# Patient Record
Sex: MALE | Race: WHITE | Employment: OTHER | URBAN - METROPOLITAN AREA
[De-identification: names, ages, dates, MRNs, and addresses within clinical notes are randomized per-mention and may not be internally consistent; named-entity substitution may affect disease eponyms.]

---

## 2017-01-04 ENCOUNTER — HOSPITAL ENCOUNTER (OUTPATIENT)
Dept: ULTRASOUND IMAGING | Age: 72
Discharge: HOME | End: 2017-01-04
Payer: COMMERCIAL

## 2017-01-04 VITALS
OXYGEN SATURATION: 99 % | DIASTOLIC BLOOD PRESSURE: 82 MMHG | HEART RATE: 73 BPM | BODY MASS INDEX: 23.19 KG/M2 | RESPIRATION RATE: 18 BRPM | SYSTOLIC BLOOD PRESSURE: 138 MMHG | HEIGHT: 70 IN | WEIGHT: 162 LBS

## 2017-01-04 DIAGNOSIS — R18.8 OTHER ASCITES: ICD-10-CM

## 2017-01-04 LAB
AMYLASE FLUID: 29 U/L
APPEARANCE FLUID: NORMAL
CELL COUNT FLUID TYPE: NORMAL
CLOT EVALUATION: NORMAL
COLOR FLUID: YELLOW
FLUID TYPE: NORMAL
GLUCOSE, FLUID: 131 MG/DL
LD, FLUID: 174 U/L
LIPASE BODY FLUID: 9.9 U/L
LYMPHOCYTES, BODY FLUID: 56 %
MONOCYTE, FLUID: 15 %
NEUTROPHIL, FLUID: 29 %
NUCLEATED CELLS FLUID: 277 /CUMM
NUMBER OF CELLS COUNTED FLUID: 100
PROTEIN FLUID: 3.7 G/DL
RBC FLUID: 300 /CUMM

## 2017-01-04 PROCEDURE — 82150 ASSAY OF AMYLASE: CPT

## 2017-01-04 PROCEDURE — 84157 ASSAY OF PROTEIN OTHER: CPT

## 2017-01-04 PROCEDURE — 88112 CYTOPATH CELL ENHANCE TECH: CPT

## 2017-01-04 PROCEDURE — 49083 ABD PARACENTESIS W/IMAGING: CPT

## 2017-01-04 PROCEDURE — 87205 SMEAR GRAM STAIN: CPT

## 2017-01-04 PROCEDURE — 49083 ABD PARACENTESIS W/IMAGING: CPT | Performed by: RADIOLOGY

## 2017-01-04 PROCEDURE — 87070 CULTURE OTHR SPECIMN AEROBIC: CPT

## 2017-01-04 PROCEDURE — 89051 BODY FLUID CELL COUNT: CPT

## 2017-01-04 PROCEDURE — 88305 TISSUE EXAM BY PATHOLOGIST: CPT

## 2017-01-04 PROCEDURE — 83690 ASSAY OF LIPASE: CPT

## 2017-01-04 PROCEDURE — 82945 GLUCOSE OTHER FLUID: CPT

## 2017-01-04 PROCEDURE — 83615 LACTATE (LD) (LDH) ENZYME: CPT

## 2017-01-04 ASSESSMENT — PAIN - FUNCTIONAL ASSESSMENT: PAIN_FUNCTIONAL_ASSESSMENT: 0-10

## 2017-01-05 ENCOUNTER — PROCEDURE VISIT (OUTPATIENT)
Dept: UROLOGY | Age: 72
End: 2017-01-05

## 2017-01-05 VITALS
SYSTOLIC BLOOD PRESSURE: 120 MMHG | HEART RATE: 87 BPM | BODY MASS INDEX: 23.62 KG/M2 | DIASTOLIC BLOOD PRESSURE: 80 MMHG | HEIGHT: 70 IN | WEIGHT: 165 LBS

## 2017-01-05 DIAGNOSIS — R33.9 URINARY RETENTION: Primary | ICD-10-CM

## 2017-01-05 LAB — GRAM STAIN RESULT: NORMAL

## 2017-01-05 PROCEDURE — 99212 OFFICE O/P EST SF 10 MIN: CPT | Performed by: UROLOGY

## 2017-01-05 PROCEDURE — 51725 SIMPLE CYSTOMETROGRAM: CPT | Performed by: UROLOGY

## 2017-01-05 PROCEDURE — 52000 CYSTOURETHROSCOPY: CPT | Performed by: UROLOGY

## 2017-01-07 LAB — BODY FLUID CULTURE, STERILE: NORMAL

## 2017-02-09 ENCOUNTER — OFFICE VISIT (OUTPATIENT)
Dept: UROLOGY | Age: 72
End: 2017-02-09

## 2017-02-09 VITALS
HEART RATE: 77 BPM | BODY MASS INDEX: 23.62 KG/M2 | SYSTOLIC BLOOD PRESSURE: 130 MMHG | HEIGHT: 70 IN | DIASTOLIC BLOOD PRESSURE: 72 MMHG | WEIGHT: 165 LBS

## 2017-02-09 DIAGNOSIS — N31.9 NEUROGENIC BLADDER: ICD-10-CM

## 2017-02-09 DIAGNOSIS — N31.2 HYPOTONIC BLADDER: ICD-10-CM

## 2017-02-09 DIAGNOSIS — R33.9 URINARY RETENTION: Primary | ICD-10-CM

## 2017-02-09 PROCEDURE — 52000 CYSTOURETHROSCOPY: CPT | Performed by: UROLOGY

## 2017-02-09 PROCEDURE — 99213 OFFICE O/P EST LOW 20 MIN: CPT | Performed by: UROLOGY

## 2017-02-09 PROCEDURE — 51725 SIMPLE CYSTOMETROGRAM: CPT | Performed by: UROLOGY

## 2017-02-09 RX ORDER — AMLODIPINE BESYLATE 5 MG/1
TABLET ORAL
COMMUNITY
Start: 2017-01-23 | End: 2017-07-11

## 2017-03-09 ENCOUNTER — OFFICE VISIT (OUTPATIENT)
Dept: UROLOGY | Age: 72
End: 2017-03-09

## 2017-03-09 VITALS
BODY MASS INDEX: 23.62 KG/M2 | DIASTOLIC BLOOD PRESSURE: 70 MMHG | WEIGHT: 165 LBS | HEART RATE: 79 BPM | HEIGHT: 70 IN | SYSTOLIC BLOOD PRESSURE: 120 MMHG

## 2017-03-09 DIAGNOSIS — N31.9 NEUROPATHIC BLADDER: Primary | ICD-10-CM

## 2017-03-09 PROCEDURE — 51703 INSERT BLADDER CATH COMPLEX: CPT | Performed by: UROLOGY

## 2017-03-09 PROCEDURE — 99212 OFFICE O/P EST SF 10 MIN: CPT | Performed by: UROLOGY

## 2017-03-21 ENCOUNTER — HOSPITAL ENCOUNTER (OUTPATIENT)
Age: 72
Setting detail: OUTPATIENT SURGERY
Discharge: HOME | End: 2017-03-21
Attending: SPECIALIST | Admitting: SPECIALIST
Payer: COMMERCIAL

## 2017-03-21 ENCOUNTER — ANESTHESIA (OUTPATIENT)
Dept: ENDOSCOPY | Age: 72
End: 2017-03-21
Payer: COMMERCIAL

## 2017-03-21 ENCOUNTER — ANESTHESIA EVENT (OUTPATIENT)
Dept: ENDOSCOPY | Age: 72
End: 2017-03-21
Payer: COMMERCIAL

## 2017-03-21 VITALS
WEIGHT: 170 LBS | HEART RATE: 70 BPM | BODY MASS INDEX: 24.34 KG/M2 | SYSTOLIC BLOOD PRESSURE: 114 MMHG | HEIGHT: 70 IN | RESPIRATION RATE: 18 BRPM | TEMPERATURE: 98.5 F | DIASTOLIC BLOOD PRESSURE: 66 MMHG | OXYGEN SATURATION: 95 %

## 2017-03-21 VITALS
TEMPERATURE: 97.7 F | DIASTOLIC BLOOD PRESSURE: 76 MMHG | OXYGEN SATURATION: 99 % | SYSTOLIC BLOOD PRESSURE: 125 MMHG | RESPIRATION RATE: 21 BRPM

## 2017-03-21 PROCEDURE — 3609017100 HC EGD: Performed by: SPECIALIST

## 2017-03-21 PROCEDURE — 7100000010 HC PHASE II RECOVERY - FIRST 15 MIN: Performed by: SPECIALIST

## 2017-03-21 PROCEDURE — 3700000000 HC ANESTHESIA ATTENDED CARE: Performed by: SPECIALIST

## 2017-03-21 PROCEDURE — 3700000001 HC ADD 15 MINUTES (ANESTHESIA): Performed by: SPECIALIST

## 2017-03-21 PROCEDURE — 6360000002 HC RX W HCPCS: Performed by: NURSE ANESTHETIST, CERTIFIED REGISTERED

## 2017-03-21 PROCEDURE — 2500000003 HC RX 250 WO HCPCS: Performed by: NURSE ANESTHETIST, CERTIFIED REGISTERED

## 2017-03-21 PROCEDURE — 2580000003 HC RX 258: Performed by: ANESTHESIOLOGY

## 2017-03-21 RX ORDER — SODIUM CHLORIDE 0.9 % (FLUSH) 0.9 %
10 SYRINGE (ML) INJECTION PRN
Status: DISCONTINUED | OUTPATIENT
Start: 2017-03-21 | End: 2017-03-21 | Stop reason: HOSPADM

## 2017-03-21 RX ORDER — SODIUM CHLORIDE 0.9 % (FLUSH) 0.9 %
10 SYRINGE (ML) INJECTION EVERY 12 HOURS SCHEDULED
Status: DISCONTINUED | OUTPATIENT
Start: 2017-03-21 | End: 2017-03-21 | Stop reason: HOSPADM

## 2017-03-21 RX ORDER — SODIUM CHLORIDE 9 MG/ML
INJECTION, SOLUTION INTRAVENOUS CONTINUOUS
Status: DISCONTINUED | OUTPATIENT
Start: 2017-03-21 | End: 2017-03-21 | Stop reason: HOSPADM

## 2017-03-21 RX ORDER — LIDOCAINE HYDROCHLORIDE 20 MG/ML
INJECTION, SOLUTION INFILTRATION; PERINEURAL PRN
Status: DISCONTINUED | OUTPATIENT
Start: 2017-03-21 | End: 2017-03-21 | Stop reason: SDUPTHER

## 2017-03-21 RX ORDER — PROPOFOL 10 MG/ML
INJECTION, EMULSION INTRAVENOUS PRN
Status: DISCONTINUED | OUTPATIENT
Start: 2017-03-21 | End: 2017-03-21 | Stop reason: SDUPTHER

## 2017-03-21 RX ORDER — LIDOCAINE HYDROCHLORIDE 10 MG/ML
1 INJECTION, SOLUTION EPIDURAL; INFILTRATION; INTRACAUDAL; PERINEURAL
Status: DISCONTINUED | OUTPATIENT
Start: 2017-03-21 | End: 2017-03-21 | Stop reason: HOSPADM

## 2017-03-21 RX ORDER — ONDANSETRON 2 MG/ML
4 INJECTION INTRAMUSCULAR; INTRAVENOUS
Status: DISCONTINUED | OUTPATIENT
Start: 2017-03-21 | End: 2017-03-21 | Stop reason: HOSPADM

## 2017-03-21 RX ADMIN — PROPOFOL 80 MG: 10 INJECTION, EMULSION INTRAVENOUS at 09:55

## 2017-03-21 RX ADMIN — PROPOFOL 30 MG: 10 INJECTION, EMULSION INTRAVENOUS at 10:00

## 2017-03-21 RX ADMIN — PROPOFOL 80 MG: 10 INJECTION, EMULSION INTRAVENOUS at 09:50

## 2017-03-21 RX ADMIN — SODIUM CHLORIDE: 9 INJECTION, SOLUTION INTRAVENOUS at 09:28

## 2017-03-21 RX ADMIN — LIDOCAINE HYDROCHLORIDE 40 MG: 20 INJECTION, SOLUTION INFILTRATION; PERINEURAL at 09:47

## 2017-03-21 RX ADMIN — PROPOFOL 150 MG: 10 INJECTION, EMULSION INTRAVENOUS at 09:47

## 2017-03-21 ASSESSMENT — PAIN SCALES - GENERAL
PAINLEVEL_OUTOF10: 0

## 2017-03-21 ASSESSMENT — PAIN - FUNCTIONAL ASSESSMENT: PAIN_FUNCTIONAL_ASSESSMENT: 0-10

## 2017-04-10 ENCOUNTER — OFFICE VISIT (OUTPATIENT)
Dept: UROLOGY | Age: 72
End: 2017-04-10

## 2017-04-10 VITALS
BODY MASS INDEX: 23.62 KG/M2 | WEIGHT: 165 LBS | HEART RATE: 51 BPM | HEIGHT: 70 IN | SYSTOLIC BLOOD PRESSURE: 110 MMHG | DIASTOLIC BLOOD PRESSURE: 60 MMHG

## 2017-04-10 DIAGNOSIS — N31.9 NEUROPATHIC BLADDER: Primary | ICD-10-CM

## 2017-04-10 PROCEDURE — 51703 INSERT BLADDER CATH COMPLEX: CPT | Performed by: UROLOGY

## 2017-04-10 PROCEDURE — 99212 OFFICE O/P EST SF 10 MIN: CPT | Performed by: UROLOGY

## 2017-05-09 ENCOUNTER — OFFICE VISIT (OUTPATIENT)
Dept: UROLOGY | Age: 72
End: 2017-05-09

## 2017-05-09 VITALS
SYSTOLIC BLOOD PRESSURE: 120 MMHG | HEART RATE: 57 BPM | WEIGHT: 175 LBS | HEIGHT: 70 IN | DIASTOLIC BLOOD PRESSURE: 70 MMHG | BODY MASS INDEX: 25.05 KG/M2

## 2017-05-09 DIAGNOSIS — N31.9 NEUROPATHIC BLADDER: Primary | ICD-10-CM

## 2017-05-09 PROCEDURE — 99212 OFFICE O/P EST SF 10 MIN: CPT | Performed by: UROLOGY

## 2017-05-09 PROCEDURE — 51703 INSERT BLADDER CATH COMPLEX: CPT | Performed by: UROLOGY

## 2017-05-09 RX ORDER — FINASTERIDE 5 MG/1
5 TABLET, FILM COATED ORAL DAILY
Qty: 90 TABLET | Refills: 3 | Status: SHIPPED | OUTPATIENT
Start: 2017-05-09 | End: 2017-06-13

## 2017-06-13 ENCOUNTER — PROCEDURE VISIT (OUTPATIENT)
Dept: UROLOGY | Age: 72
End: 2017-06-13

## 2017-06-13 VITALS
BODY MASS INDEX: 25.05 KG/M2 | DIASTOLIC BLOOD PRESSURE: 60 MMHG | WEIGHT: 175 LBS | HEIGHT: 70 IN | SYSTOLIC BLOOD PRESSURE: 110 MMHG | HEART RATE: 58 BPM

## 2017-06-13 DIAGNOSIS — N31.9 NEUROPATHIC BLADDER: Primary | ICD-10-CM

## 2017-06-13 PROCEDURE — 99999 PR OFFICE/OUTPT VISIT,PROCEDURE ONLY: CPT | Performed by: UROLOGY

## 2017-06-13 PROCEDURE — 51703 INSERT BLADDER CATH COMPLEX: CPT | Performed by: UROLOGY

## 2017-07-11 ENCOUNTER — OFFICE VISIT (OUTPATIENT)
Dept: UROLOGY | Age: 72
End: 2017-07-11

## 2017-07-11 VITALS
HEIGHT: 70 IN | BODY MASS INDEX: 25.05 KG/M2 | WEIGHT: 175 LBS | DIASTOLIC BLOOD PRESSURE: 62 MMHG | HEART RATE: 58 BPM | SYSTOLIC BLOOD PRESSURE: 110 MMHG

## 2017-07-11 DIAGNOSIS — N31.9 NEUROPATHIC BLADDER: Primary | ICD-10-CM

## 2017-07-11 PROCEDURE — 99213 OFFICE O/P EST LOW 20 MIN: CPT | Performed by: UROLOGY

## 2017-07-11 PROCEDURE — 51703 INSERT BLADDER CATH COMPLEX: CPT | Performed by: UROLOGY

## 2017-07-11 RX ORDER — NADOLOL 20 MG/1
20 TABLET ORAL DAILY
COMMUNITY

## 2017-08-11 ENCOUNTER — PROCEDURE VISIT (OUTPATIENT)
Dept: UROLOGY | Age: 72
End: 2017-08-11

## 2017-08-11 VITALS
HEIGHT: 70 IN | HEART RATE: 47 BPM | BODY MASS INDEX: 24.34 KG/M2 | SYSTOLIC BLOOD PRESSURE: 126 MMHG | WEIGHT: 170 LBS | DIASTOLIC BLOOD PRESSURE: 80 MMHG

## 2017-08-11 DIAGNOSIS — N31.9 NEUROPATHIC BLADDER: Primary | ICD-10-CM

## 2017-08-11 PROCEDURE — 51703 INSERT BLADDER CATH COMPLEX: CPT | Performed by: UROLOGY

## 2017-08-11 PROCEDURE — 99999 PR OFFICE/OUTPT VISIT,PROCEDURE ONLY: CPT | Performed by: UROLOGY

## 2017-08-22 ENCOUNTER — OFFICE VISIT (OUTPATIENT)
Dept: FAMILY MEDICINE CLINIC | Age: 72
End: 2017-08-22

## 2017-08-22 VITALS
SYSTOLIC BLOOD PRESSURE: 118 MMHG | HEIGHT: 70 IN | BODY MASS INDEX: 25.05 KG/M2 | HEART RATE: 48 BPM | WEIGHT: 175 LBS | TEMPERATURE: 96.7 F | DIASTOLIC BLOOD PRESSURE: 80 MMHG | OXYGEN SATURATION: 99 % | RESPIRATION RATE: 12 BRPM

## 2017-08-22 DIAGNOSIS — R73.9 HYPERGLYCEMIA: ICD-10-CM

## 2017-08-22 DIAGNOSIS — E79.0 HYPERURICEMIA: Primary | ICD-10-CM

## 2017-08-22 DIAGNOSIS — Z23 NEED FOR PNEUMOCOCCAL VACCINATION: ICD-10-CM

## 2017-08-22 DIAGNOSIS — N40.1 BPH WITH URINARY OBSTRUCTION: ICD-10-CM

## 2017-08-22 DIAGNOSIS — N31.9 NEUROPATHIC BLADDER: ICD-10-CM

## 2017-08-22 DIAGNOSIS — Z12.11 SCREENING FOR COLON CANCER: ICD-10-CM

## 2017-08-22 DIAGNOSIS — N13.8 BPH WITH URINARY OBSTRUCTION: ICD-10-CM

## 2017-08-22 DIAGNOSIS — Z87.898 HISTORY OF ASCITES: ICD-10-CM

## 2017-08-22 PROCEDURE — G0009 ADMIN PNEUMOCOCCAL VACCINE: HCPCS | Performed by: FAMILY MEDICINE

## 2017-08-22 PROCEDURE — 90670 PCV13 VACCINE IM: CPT | Performed by: FAMILY MEDICINE

## 2017-08-22 PROCEDURE — 99203 OFFICE O/P NEW LOW 30 MIN: CPT | Performed by: FAMILY MEDICINE

## 2017-08-22 RX ORDER — POLYETHYLENE GLYCOL 3350, SODIUM CHLORIDE, SODIUM BICARBONATE, POTASSIUM CHLORIDE 420; 11.2; 5.72; 1.48 G/4L; G/4L; G/4L; G/4L
4000 POWDER, FOR SOLUTION ORAL ONCE
Qty: 4000 ML | Refills: 0 | Status: CANCELLED | OUTPATIENT
Start: 2017-08-22 | End: 2017-08-22

## 2017-08-22 ASSESSMENT — PATIENT HEALTH QUESTIONNAIRE - PHQ9
SUM OF ALL RESPONSES TO PHQ QUESTIONS 1-9: 0
1. LITTLE INTEREST OR PLEASURE IN DOING THINGS: 0
2. FEELING DOWN, DEPRESSED OR HOPELESS: 0
SUM OF ALL RESPONSES TO PHQ9 QUESTIONS 1 & 2: 0

## 2017-08-22 ASSESSMENT — ENCOUNTER SYMPTOMS
ALLERGIC/IMMUNOLOGIC NEGATIVE: 1
GASTROINTESTINAL NEGATIVE: 1
EYES NEGATIVE: 1
RESPIRATORY NEGATIVE: 1

## 2017-08-25 DIAGNOSIS — Z87.898 HISTORY OF ASCITES: ICD-10-CM

## 2017-08-25 DIAGNOSIS — E79.0 HYPERURICEMIA: ICD-10-CM

## 2017-08-25 DIAGNOSIS — N31.9 NEUROPATHIC BLADDER: ICD-10-CM

## 2017-08-25 DIAGNOSIS — R73.9 HYPERGLYCEMIA: ICD-10-CM

## 2017-08-25 LAB
ALBUMIN SERPL-MCNC: 4.2 G/DL (ref 3.9–4.9)
ALP BLD-CCNC: 130 U/L (ref 35–104)
ALT SERPL-CCNC: 10 U/L (ref 0–41)
ANION GAP SERPL CALCULATED.3IONS-SCNC: 16 MEQ/L (ref 7–13)
AST SERPL-CCNC: 18 U/L (ref 0–40)
BASOPHILS ABSOLUTE: 0.1 K/UL (ref 0–0.2)
BASOPHILS RELATIVE PERCENT: 1.3 %
BILIRUB SERPL-MCNC: 0.9 MG/DL (ref 0–1.2)
BUN BLDV-MCNC: 15 MG/DL (ref 8–23)
CALCIUM SERPL-MCNC: 9.6 MG/DL (ref 8.6–10.2)
CHLORIDE BLD-SCNC: 98 MEQ/L (ref 98–107)
CHOLESTEROL, TOTAL: 148 MG/DL (ref 0–199)
CO2: 26 MEQ/L (ref 22–29)
CREAT SERPL-MCNC: 1.04 MG/DL (ref 0.7–1.2)
CREATININE URINE: 80.4 MG/DL
EOSINOPHILS ABSOLUTE: 0.2 K/UL (ref 0–0.7)
EOSINOPHILS RELATIVE PERCENT: 5.2 %
GFR AFRICAN AMERICAN: >60
GFR NON-AFRICAN AMERICAN: >60
GLOBULIN: 3.6 G/DL (ref 2.3–3.5)
GLUCOSE BLD-MCNC: 104 MG/DL (ref 74–109)
HBA1C MFR BLD: 6.2 % (ref 4.8–5.9)
HCT VFR BLD CALC: 36.8 % (ref 42–52)
HDLC SERPL-MCNC: 36 MG/DL (ref 40–59)
HEMOGLOBIN: 12.4 G/DL (ref 14–18)
LDL CHOLESTEROL CALCULATED: 93 MG/DL (ref 0–129)
LYMPHOCYTES ABSOLUTE: 1 K/UL (ref 1–4.8)
LYMPHOCYTES RELATIVE PERCENT: 22.2 %
MCH RBC QN AUTO: 31.5 PG (ref 27–31.3)
MCHC RBC AUTO-ENTMCNC: 33.6 % (ref 33–37)
MCV RBC AUTO: 93.8 FL (ref 80–100)
MICROALBUMIN UR-MCNC: 15.2 MG/DL
MICROALBUMIN/CREAT UR-RTO: 189.1 MG/G (ref 0–30)
MONOCYTES ABSOLUTE: 0.6 K/UL (ref 0.2–0.8)
MONOCYTES RELATIVE PERCENT: 12.1 %
NEUTROPHILS ABSOLUTE: 2.8 K/UL (ref 1.4–6.5)
NEUTROPHILS RELATIVE PERCENT: 59.2 %
PDW BLD-RTO: 15.7 % (ref 11.5–14.5)
PLATELET # BLD: 163 K/UL (ref 130–400)
POTASSIUM SERPL-SCNC: 4.2 MEQ/L (ref 3.5–5.1)
RBC # BLD: 3.92 M/UL (ref 4.7–6.1)
SODIUM BLD-SCNC: 140 MEQ/L (ref 132–144)
TOTAL PROTEIN: 7.8 G/DL (ref 6.4–8.1)
TRIGL SERPL-MCNC: 93 MG/DL (ref 0–200)
TSH REFLEX: 2.65 UIU/ML (ref 0.27–4.2)
URIC ACID, SERUM: 6.6 MG/DL (ref 3.4–7)
WBC # BLD: 4.7 K/UL (ref 4.8–10.8)

## 2017-09-05 RX ORDER — TAMSULOSIN HYDROCHLORIDE 0.4 MG/1
0.4 CAPSULE ORAL DAILY
Qty: 90 CAPSULE | Refills: 3 | Status: ON HOLD | OUTPATIENT
Start: 2017-09-05 | End: 2017-11-21

## 2017-09-11 ENCOUNTER — PROCEDURE VISIT (OUTPATIENT)
Dept: UROLOGY | Age: 72
End: 2017-09-11

## 2017-09-11 VITALS
BODY MASS INDEX: 25.05 KG/M2 | HEIGHT: 70 IN | WEIGHT: 175 LBS | HEART RATE: 49 BPM | SYSTOLIC BLOOD PRESSURE: 130 MMHG | DIASTOLIC BLOOD PRESSURE: 82 MMHG

## 2017-09-11 DIAGNOSIS — N31.9 NEUROPATHIC BLADDER: Primary | ICD-10-CM

## 2017-09-11 PROCEDURE — 99212 OFFICE O/P EST SF 10 MIN: CPT | Performed by: UROLOGY

## 2017-09-11 PROCEDURE — 51703 INSERT BLADDER CATH COMPLEX: CPT | Performed by: UROLOGY

## 2017-09-22 ENCOUNTER — OFFICE VISIT (OUTPATIENT)
Dept: FAMILY MEDICINE CLINIC | Age: 72
End: 2017-09-22

## 2017-09-22 VITALS
RESPIRATION RATE: 12 BRPM | OXYGEN SATURATION: 99 % | TEMPERATURE: 96.4 F | HEIGHT: 70 IN | HEART RATE: 52 BPM | SYSTOLIC BLOOD PRESSURE: 122 MMHG | BODY MASS INDEX: 25.2 KG/M2 | WEIGHT: 176 LBS | DIASTOLIC BLOOD PRESSURE: 60 MMHG

## 2017-09-22 DIAGNOSIS — N31.9 NEUROPATHIC BLADDER: ICD-10-CM

## 2017-09-22 DIAGNOSIS — E79.0 HYPERURICEMIA: ICD-10-CM

## 2017-09-22 DIAGNOSIS — Z12.11 SCREENING FOR COLON CANCER: ICD-10-CM

## 2017-09-22 DIAGNOSIS — R73.02 GLUCOSE INTOLERANCE (IMPAIRED GLUCOSE TOLERANCE): Primary | ICD-10-CM

## 2017-09-22 DIAGNOSIS — Z23 NEEDS FLU SHOT: ICD-10-CM

## 2017-09-22 DIAGNOSIS — Z97.8 CHRONIC INDWELLING FOLEY CATHETER: ICD-10-CM

## 2017-09-22 DIAGNOSIS — I10 ESSENTIAL HYPERTENSION: ICD-10-CM

## 2017-09-22 DIAGNOSIS — E55.9 VITAMIN D DEFICIENCY: ICD-10-CM

## 2017-09-22 PROCEDURE — 90662 IIV NO PRSV INCREASED AG IM: CPT | Performed by: FAMILY MEDICINE

## 2017-09-22 PROCEDURE — G0008 ADMIN INFLUENZA VIRUS VAC: HCPCS | Performed by: FAMILY MEDICINE

## 2017-09-22 PROCEDURE — 99214 OFFICE O/P EST MOD 30 MIN: CPT | Performed by: FAMILY MEDICINE

## 2017-09-22 RX ORDER — POLYETHYLENE GLYCOL 3350, SODIUM CHLORIDE, SODIUM BICARBONATE, POTASSIUM CHLORIDE 420; 11.2; 5.72; 1.48 G/4L; G/4L; G/4L; G/4L
4000 POWDER, FOR SOLUTION ORAL ONCE
Qty: 4000 ML | Refills: 0 | Status: SHIPPED | OUTPATIENT
Start: 2017-09-22 | End: 2017-09-22

## 2017-09-22 ASSESSMENT — ENCOUNTER SYMPTOMS
RESPIRATORY NEGATIVE: 1
EYES NEGATIVE: 1
GASTROINTESTINAL NEGATIVE: 1
ALLERGIC/IMMUNOLOGIC NEGATIVE: 1

## 2017-10-12 ENCOUNTER — OFFICE VISIT (OUTPATIENT)
Dept: UROLOGY | Age: 72
End: 2017-10-12

## 2017-10-12 VITALS
WEIGHT: 170 LBS | DIASTOLIC BLOOD PRESSURE: 70 MMHG | HEART RATE: 51 BPM | HEIGHT: 70 IN | SYSTOLIC BLOOD PRESSURE: 120 MMHG | BODY MASS INDEX: 24.34 KG/M2

## 2017-10-12 DIAGNOSIS — N31.9 NEUROPATHIC BLADDER: Primary | ICD-10-CM

## 2017-10-12 PROCEDURE — 51703 INSERT BLADDER CATH COMPLEX: CPT | Performed by: UROLOGY

## 2017-10-12 NOTE — PROGRESS NOTES
Reason for visit complicated coudé catheter placement  Neurogenic bladder with enlarged prostate    Procedure note  Patient prepped in a sterile fashion  Complicated Camacho catheter placement  Coudé catheter placed by ALEXANDRIA Hurtado irrigated to clear  Patient tolerated procedure well  Follow-up 1 month  Dr Adriana Joshi

## 2017-11-14 ENCOUNTER — OFFICE VISIT (OUTPATIENT)
Dept: UROLOGY | Age: 72
End: 2017-11-14

## 2017-11-14 VITALS
DIASTOLIC BLOOD PRESSURE: 66 MMHG | WEIGHT: 170 LBS | BODY MASS INDEX: 24.34 KG/M2 | HEART RATE: 55 BPM | HEIGHT: 70 IN | SYSTOLIC BLOOD PRESSURE: 136 MMHG

## 2017-11-14 DIAGNOSIS — N31.9 NEUROPATHIC BLADDER: Primary | ICD-10-CM

## 2017-11-14 PROCEDURE — 99212 OFFICE O/P EST SF 10 MIN: CPT | Performed by: UROLOGY

## 2017-11-14 PROCEDURE — 51703 INSERT BLADDER CATH COMPLEX: CPT | Performed by: UROLOGY

## 2017-11-20 ENCOUNTER — ANESTHESIA EVENT (OUTPATIENT)
Dept: ENDOSCOPY | Age: 72
End: 2017-11-20
Payer: MEDICARE

## 2017-11-21 ENCOUNTER — ANESTHESIA (OUTPATIENT)
Dept: ENDOSCOPY | Age: 72
End: 2017-11-21
Payer: MEDICARE

## 2017-11-21 ENCOUNTER — HOSPITAL ENCOUNTER (OUTPATIENT)
Age: 72
Setting detail: OUTPATIENT SURGERY
Discharge: HOME | End: 2017-11-21
Attending: INTERNAL MEDICINE | Admitting: INTERNAL MEDICINE
Payer: MEDICARE

## 2017-11-21 VITALS
OXYGEN SATURATION: 97 % | TEMPERATURE: 98.1 F | BODY MASS INDEX: 24.34 KG/M2 | RESPIRATION RATE: 16 BRPM | HEART RATE: 58 BPM | HEIGHT: 70 IN | DIASTOLIC BLOOD PRESSURE: 67 MMHG | SYSTOLIC BLOOD PRESSURE: 108 MMHG | WEIGHT: 170 LBS

## 2017-11-21 VITALS — SYSTOLIC BLOOD PRESSURE: 88 MMHG | OXYGEN SATURATION: 100 % | DIASTOLIC BLOOD PRESSURE: 50 MMHG

## 2017-11-21 PROCEDURE — 2580000003 HC RX 258: Performed by: INTERNAL MEDICINE

## 2017-11-21 PROCEDURE — 3609027000 HC COLONOSCOPY: Performed by: INTERNAL MEDICINE

## 2017-11-21 PROCEDURE — 6360000002 HC RX W HCPCS: Performed by: NURSE ANESTHETIST, CERTIFIED REGISTERED

## 2017-11-21 PROCEDURE — 3700000001 HC ADD 15 MINUTES (ANESTHESIA): Performed by: INTERNAL MEDICINE

## 2017-11-21 PROCEDURE — 2500000003 HC RX 250 WO HCPCS: Performed by: NURSE ANESTHETIST, CERTIFIED REGISTERED

## 2017-11-21 PROCEDURE — 88305 TISSUE EXAM BY PATHOLOGIST: CPT

## 2017-11-21 PROCEDURE — 45385 COLONOSCOPY W/LESION REMOVAL: CPT | Performed by: INTERNAL MEDICINE

## 2017-11-21 PROCEDURE — 7100000011 HC PHASE II RECOVERY - ADDTL 15 MIN: Performed by: INTERNAL MEDICINE

## 2017-11-21 PROCEDURE — 3700000000 HC ANESTHESIA ATTENDED CARE: Performed by: INTERNAL MEDICINE

## 2017-11-21 PROCEDURE — 7100000010 HC PHASE II RECOVERY - FIRST 15 MIN: Performed by: INTERNAL MEDICINE

## 2017-11-21 RX ORDER — ONDANSETRON 2 MG/ML
4 INJECTION INTRAMUSCULAR; INTRAVENOUS
Status: DISCONTINUED | OUTPATIENT
Start: 2017-11-21 | End: 2017-11-21 | Stop reason: HOSPADM

## 2017-11-21 RX ORDER — PROPOFOL 10 MG/ML
INJECTION, EMULSION INTRAVENOUS PRN
Status: DISCONTINUED | OUTPATIENT
Start: 2017-11-21 | End: 2017-11-21 | Stop reason: SDUPTHER

## 2017-11-21 RX ORDER — SODIUM CHLORIDE 0.9 % (FLUSH) 0.9 %
10 SYRINGE (ML) INJECTION PRN
Status: DISCONTINUED | OUTPATIENT
Start: 2017-11-21 | End: 2017-11-21 | Stop reason: HOSPADM

## 2017-11-21 RX ORDER — GLYCOPYRROLATE 0.2 MG/ML
INJECTION INTRAMUSCULAR; INTRAVENOUS PRN
Status: DISCONTINUED | OUTPATIENT
Start: 2017-11-21 | End: 2017-11-21 | Stop reason: SDUPTHER

## 2017-11-21 RX ORDER — SODIUM CHLORIDE 0.9 % (FLUSH) 0.9 %
10 SYRINGE (ML) INJECTION EVERY 12 HOURS SCHEDULED
Status: DISCONTINUED | OUTPATIENT
Start: 2017-11-21 | End: 2017-11-21 | Stop reason: HOSPADM

## 2017-11-21 RX ORDER — SODIUM CHLORIDE 9 MG/ML
INJECTION, SOLUTION INTRAVENOUS CONTINUOUS
Status: DISCONTINUED | OUTPATIENT
Start: 2017-11-21 | End: 2017-11-21 | Stop reason: HOSPADM

## 2017-11-21 RX ORDER — LIDOCAINE HYDROCHLORIDE 10 MG/ML
1 INJECTION, SOLUTION EPIDURAL; INFILTRATION; INTRACAUDAL; PERINEURAL
Status: DISCONTINUED | OUTPATIENT
Start: 2017-11-21 | End: 2017-11-21 | Stop reason: HOSPADM

## 2017-11-21 RX ADMIN — PROPOFOL 50 MG: 10 INJECTION, EMULSION INTRAVENOUS at 09:41

## 2017-11-21 RX ADMIN — GLYCOPYRROLATE 0.2 MG: 0.2 INJECTION INTRAMUSCULAR; INTRAVENOUS at 09:54

## 2017-11-21 RX ADMIN — PROPOFOL 20 MG: 10 INJECTION, EMULSION INTRAVENOUS at 09:53

## 2017-11-21 RX ADMIN — PROPOFOL 10 MG: 10 INJECTION, EMULSION INTRAVENOUS at 09:42

## 2017-11-21 RX ADMIN — PROPOFOL 20 MG: 10 INJECTION, EMULSION INTRAVENOUS at 09:51

## 2017-11-21 RX ADMIN — PROPOFOL 20 MG: 10 INJECTION, EMULSION INTRAVENOUS at 09:46

## 2017-11-21 RX ADMIN — PROPOFOL 50 MG: 10 INJECTION, EMULSION INTRAVENOUS at 09:49

## 2017-11-21 RX ADMIN — PROPOFOL 50 MG: 10 INJECTION, EMULSION INTRAVENOUS at 09:44

## 2017-11-21 RX ADMIN — SODIUM CHLORIDE: 900 INJECTION, SOLUTION INTRAVENOUS at 08:52

## 2017-11-21 RX ADMIN — PROPOFOL 50 MG: 10 INJECTION, EMULSION INTRAVENOUS at 09:40

## 2017-11-21 RX ADMIN — SODIUM CHLORIDE: 900 INJECTION, SOLUTION INTRAVENOUS at 10:00

## 2017-11-21 RX ADMIN — GLYCOPYRROLATE 0.2 MG: 0.2 INJECTION INTRAMUSCULAR; INTRAVENOUS at 09:39

## 2017-11-21 NOTE — ANESTHESIA POSTPROCEDURE EVALUATION
Department of Anesthesiology  Postprocedure Note    Patient: Glen Dixon  MRN: [id number]  YOB: 1945  Date of evaluation: 11/21/2017  Time:  10:06 AM     Procedure Summary     Date:  11/21/17 Room / Location:  [address]    Anesthesia Start:  2118 Anesthesia Stop:      Procedure:  COLONOSCOPY (N/A ) Diagnosis:  ( - Screening)    Surgeon:  Anna Orlando MD Responsible Provider:  Darrell Hernández CRNA    Anesthesia Type:  MAC ASA Status:  3          Anesthesia Type: MAC    Henok Phase I: Henok Score: 10    Henok Phase II:      Last vitals: Reviewed and per EMR flowsheets.        Anesthesia Post Evaluation    Patient location during evaluation: PACU  Patient participation: complete - patient participated  Level of consciousness: awake  Pain score: 0  Airway patency: patent  Nausea & Vomiting: no nausea and no vomiting  Complications: no  Cardiovascular status: blood pressure returned to baseline  Respiratory status: acceptable  Hydration status: euvolemic

## 2017-11-21 NOTE — ANESTHESIA PRE PROCEDURE
Department of Anesthesiology  Preprocedure Note       Name:  Jo-Ann Cross   Age:  67 y.o.  :  1945                                          MRN:  [id number]         Date:  2017      Surgeon: Amina Foster):  Adriel Sellers MD    Procedure: Procedure(s):  COLONOSCOPY    Medications prior to admission:   Prior to Admission medications    Medication Sig Start Date End Date Taking? Authorizing Provider   tamsulosin (FLOMAX) 0.4 MG capsule Take 1 capsule by mouth daily 17   Alanis Lopez MD   nadolol (CORGARD) 20 MG tablet Take 20 mg by mouth daily    Historical Provider, MD   spironolactone (ALDACTONE) 50 MG tablet Take 50 mg by mouth daily    Historical Provider, MD   furosemide (LASIX) 40 MG tablet Take 40 mg by mouth 2 times daily    Historical Provider, MD   tamsulosin (FLOMAX) 0.4 MG capsule Take 1 capsule by mouth daily 16   Alanis Lopez MD   finasteride (PROSCAR) 5 MG tablet Take 1 tablet by mouth daily 16   Alanis Lopez MD   allopurinol (ZYLOPRIM) 300 MG tablet Take 300 mg by mouth daily  3/5/15   Historical Provider, MD   metFORMIN ER (GLUCOPHAGE-XR) 500 MG XR tablet Take 500 mg by mouth daily (with breakfast)  3/5/15   Historical Provider, MD       Current medications:    No current facility-administered medications for this encounter.       Current Outpatient Prescriptions   Medication Sig Dispense Refill    tamsulosin (FLOMAX) 0.4 MG capsule Take 1 capsule by mouth daily 90 capsule 3    nadolol (CORGARD) 20 MG tablet Take 20 mg by mouth daily      spironolactone (ALDACTONE) 50 MG tablet Take 50 mg by mouth daily      furosemide (LASIX) 40 MG tablet Take 40 mg by mouth 2 times daily      tamsulosin (FLOMAX) 0.4 MG capsule Take 1 capsule by mouth daily 90 capsule 3    finasteride (PROSCAR) 5 MG tablet Take 1 tablet by mouth daily 90 tablet 3    allopurinol (ZYLOPRIM) 300 MG tablet Take 300 mg by mouth daily       metFORMIN ER (GLUCOPHAGE-XR) 500 MG XR tablet Take 500 mg by mouth daily (with breakfast)          Allergies:  No Known Allergies    Problem List:    Patient Active Problem List   Diagnosis Code    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    BPH with urinary obstruction N40.1, N13.8    Hyperuricemia E79.0    Glucose intolerance (impaired glucose tolerance) R73.02    History of ascites Z87.898    Chronic indwelling Camacho catheter Z92.89    Essential hypertension I10       Past Medical History:        Diagnosis Date    Cancer ([facility] [address]     liver    Gout     Hyperlipidemia     Hypertension     Type II or unspecified type diabetes mellitus without mention of complication, not stated as uncontrolled        Past Surgical History:        Procedure Laterality Date    COLONOSCOPY      NC ESOPHAGOGASTRODUODENOSCOPY TRANSORAL DIAGNOSTIC N/A 3/21/2017    EGD ESOPHAGOGASTRODUODENOSCOPY performed by Padilla Durand MD at [place] [address] N/A 12/6/2016    EGD BIOPSY, Banding performed by Padilla Durand MD at [facility] History:    Social History   Substance Use Topics    Smoking status: Former Smoker     Quit date: 5/12/1975    Smokeless tobacco: Never Used    Alcohol use Yes      Comment: occasionally                                Counseling given: Not Answered      Vital Signs (Current): There were no vitals filed for this visit.                                            BP Readings from Last 3 Encounters:   11/14/17 136/66   10/12/17 120/70   09/22/17 122/60       NPO Status:                                                                                 BMI:   Wt Readings from Last 3 Encounters:   11/14/17 170 lb (77.1 kg)   10/12/17 170 lb (77.1 kg)   09/22/17 176 lb (79.8 kg)     There is no height or weight on file to calculate BMI.    CBC:   Lab Results   Component Value Date    WBC 4.7 08/25/2017    RBC 3.92 08/25/2017    HGB 12.4 08/25/2017    HCT 36.8 08/25/2017    MCV 93.8 08/25/2017    RDW 15.7

## 2017-12-12 ENCOUNTER — OFFICE VISIT (OUTPATIENT)
Dept: UROLOGY | Age: 72
End: 2017-12-12

## 2017-12-12 VITALS
DIASTOLIC BLOOD PRESSURE: 80 MMHG | HEIGHT: 70 IN | HEART RATE: 56 BPM | SYSTOLIC BLOOD PRESSURE: 120 MMHG | BODY MASS INDEX: 24.34 KG/M2 | WEIGHT: 170 LBS

## 2017-12-12 DIAGNOSIS — N31.9 NEUROPATHIC BLADDER: Primary | ICD-10-CM

## 2017-12-12 PROCEDURE — 51703 INSERT BLADDER CATH COMPLEX: CPT | Performed by: UROLOGY

## 2017-12-12 PROCEDURE — 99999 PR OFFICE/OUTPT VISIT,PROCEDURE ONLY: CPT | Performed by: UROLOGY

## 2017-12-12 RX ORDER — FINASTERIDE 5 MG/1
5 TABLET, FILM COATED ORAL DAILY
Qty: 90 TABLET | Refills: 3 | Status: SHIPPED | OUTPATIENT
Start: 2017-12-12 | End: 2018-08-14

## 2017-12-12 RX ORDER — TAMSULOSIN HYDROCHLORIDE 0.4 MG/1
0.4 CAPSULE ORAL DAILY
Qty: 90 CAPSULE | Refills: 3 | Status: SHIPPED | OUTPATIENT
Start: 2017-12-12 | End: 2019-05-21

## 2018-01-02 ENCOUNTER — OFFICE VISIT (OUTPATIENT)
Dept: FAMILY MEDICINE CLINIC | Age: 73
End: 2018-01-02

## 2018-01-02 VITALS
BODY MASS INDEX: 25.05 KG/M2 | DIASTOLIC BLOOD PRESSURE: 80 MMHG | HEART RATE: 80 BPM | WEIGHT: 175 LBS | HEIGHT: 70 IN | SYSTOLIC BLOOD PRESSURE: 120 MMHG | TEMPERATURE: 99 F | RESPIRATION RATE: 16 BRPM

## 2018-01-02 DIAGNOSIS — J01.90 ACUTE NON-RECURRENT SINUSITIS, UNSPECIFIED LOCATION: Primary | ICD-10-CM

## 2018-01-02 DIAGNOSIS — R05.9 COUGH: ICD-10-CM

## 2018-01-02 PROCEDURE — 99213 OFFICE O/P EST LOW 20 MIN: CPT | Performed by: FAMILY MEDICINE

## 2018-01-02 RX ORDER — DOXYCYCLINE HYCLATE 100 MG/1
100 CAPSULE ORAL 2 TIMES DAILY
Qty: 30 CAPSULE | Refills: 0 | Status: SHIPPED | OUTPATIENT
Start: 2018-01-02 | End: 2018-01-17

## 2018-01-02 ASSESSMENT — ENCOUNTER SYMPTOMS
SORE THROAT: 1
WHEEZING: 0
GASTROINTESTINAL NEGATIVE: 1
HEMOPTYSIS: 0
COUGH: 1
SHORTNESS OF BREATH: 0
RHINORRHEA: 1
SINUS PRESSURE: 1
HEARTBURN: 0

## 2018-01-02 NOTE — PROGRESS NOTES
Subjective  Jonathon Hyman, 67 y.o. male presents today with:  Chief Complaint   Patient presents with    Cough    Chest Congestion       Cough   This is a new problem. The current episode started in the past 7 days. The problem has been gradually worsening. The problem occurs every few minutes. The cough is productive of sputum. Associated symptoms include chills, headaches, nasal congestion, postnasal drip, rhinorrhea and a sore throat. Pertinent negatives include no chest pain, ear congestion, ear pain, fever, heartburn, hemoptysis, myalgias, rash, shortness of breath, sweats, weight loss or wheezing. Nothing aggravates the symptoms. He has tried OTC cough suppressant for the symptoms. The treatment provided mild relief. There is no history of asthma, bronchiectasis, bronchitis, COPD, emphysema, environmental allergies or pneumonia. Review of Systems   Constitutional: Positive for chills. Negative for fever and weight loss. HENT: Positive for congestion, postnasal drip, rhinorrhea, sinus pressure and sore throat. Negative for ear pain. Respiratory: Positive for cough. Negative for hemoptysis, shortness of breath and wheezing. Cardiovascular: Negative. Negative for chest pain. Gastrointestinal: Negative. Negative for heartburn. Musculoskeletal: Negative for myalgias. Skin: Negative for rash. Allergic/Immunologic: Negative for environmental allergies. Neurological: Positive for headaches.          Past Medical History:   Diagnosis Date    Cancer ([address]     liver    Gout     Hyperlipidemia     Hypertension     Type II or unspecified type diabetes mellitus without mention of complication, not stated as uncontrolled      Past Surgical History:   Procedure Laterality Date    COLONOSCOPY      ENDOSCOPY, COLON, DIAGNOSTIC      [place] COLON CA SCRN NOT HI RSK IND N/A 11/21/2017    COLONOSCOPY performed by Florencia Hunt MD at [address] [facility] [address] ESOPHAGOGASTRODUODENOSCOPY TRANSORAL well-nourished. No distress. HENT:   Head: Normocephalic and atraumatic. Right Ear: A middle ear effusion is present. Left Ear: A middle ear effusion is present. Nose: Mucosal edema and rhinorrhea present. Right sinus exhibits no maxillary sinus tenderness and no frontal sinus tenderness. Left sinus exhibits maxillary sinus tenderness and frontal sinus tenderness. Mouth/Throat: Uvula is midline and mucous membranes are normal. No uvula swelling. Posterior oropharyngeal edema and posterior oropharyngeal erythema present. Oropharyngeal exudate: PND. Cardiovascular: Normal rate and regular rhythm. Pulmonary/Chest: Effort normal and breath sounds normal.   Abdominal: Soft. Bowel sounds are normal.   Neurological: He is alert and oriented to person, place, and time. Skin: Skin is warm and dry. He is not diaphoretic. Nursing note and vitals reviewed. Assessment & Plan   1. Acute non-recurrent sinusitis, unspecified location  doxycycline hyclate (VIBRAMYCIN) 100 MG capsule   2. Cough       No orders of the defined types were placed in this encounter. Orders Placed This Encounter   Medications    doxycycline hyclate (VIBRAMYCIN) 100 MG capsule     Sig: Take 1 capsule by mouth 2 times daily for 15 days     Dispense:  30 capsule     Refill:  0     There are no discontinued medications. Counseling given: Not Answered  Recommend OTC neilmed neti pot/ sinus rinse at least 4 times daily follow instructions on package      No Follow-up on file.     Jadyn Jaquez DO

## 2018-01-16 ENCOUNTER — OFFICE VISIT (OUTPATIENT)
Dept: UROLOGY | Age: 73
End: 2018-01-16

## 2018-01-16 VITALS
SYSTOLIC BLOOD PRESSURE: 122 MMHG | HEIGHT: 70 IN | HEART RATE: 52 BPM | BODY MASS INDEX: 24.34 KG/M2 | DIASTOLIC BLOOD PRESSURE: 68 MMHG | WEIGHT: 170 LBS

## 2018-01-16 DIAGNOSIS — N31.9 NEUROPATHIC BLADDER: Primary | ICD-10-CM

## 2018-01-16 PROCEDURE — 99999 PR OFFICE/OUTPT VISIT,PROCEDURE ONLY: CPT | Performed by: UROLOGY

## 2018-01-16 PROCEDURE — 51703 INSERT BLADDER CATH COMPLEX: CPT | Performed by: UROLOGY

## 2018-01-16 NOTE — PROGRESS NOTES
Neuropathic bladder with chronic indwelling Camacho here for catheter change    Neuropathic bladder etiology unknown  History of ascites under good control  Patient denies sensation of filling or urgency    Procedure note  Complex catheter change due to altered anatomy changed by urologist 18 Danish coudé catheter  Bladder irrigated to clear  Follow-up one month for cath change  Dr Praveen Ritchie

## 2018-02-01 RX ORDER — METFORMIN HYDROCHLORIDE 500 MG/1
500 TABLET, EXTENDED RELEASE ORAL
Qty: 30 TABLET | Refills: 5 | Status: SHIPPED | OUTPATIENT
Start: 2018-02-01 | End: 2019-01-15 | Stop reason: SDUPTHER

## 2018-02-20 ENCOUNTER — PROCEDURE VISIT (OUTPATIENT)
Dept: UROLOGY | Age: 73
End: 2018-02-20
Payer: MEDICARE

## 2018-02-20 VITALS
SYSTOLIC BLOOD PRESSURE: 136 MMHG | DIASTOLIC BLOOD PRESSURE: 70 MMHG | BODY MASS INDEX: 24.34 KG/M2 | HEART RATE: 53 BPM | HEIGHT: 70 IN | WEIGHT: 170 LBS

## 2018-02-20 DIAGNOSIS — Z97.8 CHRONIC INDWELLING FOLEY CATHETER: Primary | ICD-10-CM

## 2018-02-20 PROCEDURE — 99999 PR OFFICE/OUTPT VISIT,PROCEDURE ONLY: CPT | Performed by: UROLOGY

## 2018-02-20 PROCEDURE — 51703 INSERT BLADDER CATH COMPLEX: CPT | Performed by: UROLOGY

## 2018-03-15 DIAGNOSIS — E79.0 HYPERURICEMIA: ICD-10-CM

## 2018-03-15 DIAGNOSIS — I10 ESSENTIAL HYPERTENSION: ICD-10-CM

## 2018-03-15 DIAGNOSIS — N31.9 NEUROPATHIC BLADDER: ICD-10-CM

## 2018-03-15 DIAGNOSIS — R73.02 GLUCOSE INTOLERANCE (IMPAIRED GLUCOSE TOLERANCE): ICD-10-CM

## 2018-03-15 DIAGNOSIS — E55.9 VITAMIN D DEFICIENCY: ICD-10-CM

## 2018-03-15 DIAGNOSIS — Z97.8 CHRONIC INDWELLING FOLEY CATHETER: ICD-10-CM

## 2018-03-15 LAB
ALBUMIN SERPL-MCNC: 4.4 G/DL (ref 3.9–4.9)
ALP BLD-CCNC: 136 U/L (ref 35–104)
ALT SERPL-CCNC: 15 U/L (ref 0–41)
ANION GAP SERPL CALCULATED.3IONS-SCNC: 15 MEQ/L (ref 7–13)
AST SERPL-CCNC: 22 U/L (ref 0–40)
BASOPHILS ABSOLUTE: 0.1 K/UL (ref 0–0.2)
BASOPHILS RELATIVE PERCENT: 1.7 %
BILIRUB SERPL-MCNC: 0.6 MG/DL (ref 0–1.2)
BUN BLDV-MCNC: 20 MG/DL (ref 8–23)
CALCIUM SERPL-MCNC: 9.8 MG/DL (ref 8.6–10.2)
CHLORIDE BLD-SCNC: 99 MEQ/L (ref 98–107)
CO2: 26 MEQ/L (ref 22–29)
CREAT SERPL-MCNC: 1.17 MG/DL (ref 0.7–1.2)
CREATININE URINE: 149.7 MG/DL
EOSINOPHILS ABSOLUTE: 0.2 K/UL (ref 0–0.7)
EOSINOPHILS RELATIVE PERCENT: 6.2 %
GFR AFRICAN AMERICAN: >60
GFR NON-AFRICAN AMERICAN: >60
GLOBULIN: 3.7 G/DL (ref 2.3–3.5)
GLUCOSE BLD-MCNC: 127 MG/DL (ref 74–109)
HBA1C MFR BLD: 6.5 % (ref 4.8–5.9)
HCT VFR BLD CALC: 38.4 % (ref 42–52)
HEMOGLOBIN: 12.7 G/DL (ref 14–18)
LYMPHOCYTES ABSOLUTE: 0.8 K/UL (ref 1–4.8)
LYMPHOCYTES RELATIVE PERCENT: 23.2 %
MAGNESIUM: 1.9 MG/DL (ref 1.7–2.3)
MCH RBC QN AUTO: 31.8 PG (ref 27–31.3)
MCHC RBC AUTO-ENTMCNC: 33 % (ref 33–37)
MCV RBC AUTO: 96.5 FL (ref 80–100)
MICROALBUMIN UR-MCNC: 32.2 MG/DL
MICROALBUMIN/CREAT UR-RTO: 215.1 MG/G (ref 0–30)
MONOCYTES ABSOLUTE: 0.4 K/UL (ref 0.2–0.8)
MONOCYTES RELATIVE PERCENT: 10.3 %
NEUTROPHILS ABSOLUTE: 2.1 K/UL (ref 1.4–6.5)
NEUTROPHILS RELATIVE PERCENT: 58.6 %
PDW BLD-RTO: 15.6 % (ref 11.5–14.5)
PLATELET # BLD: 151 K/UL (ref 130–400)
POTASSIUM SERPL-SCNC: 4.4 MEQ/L (ref 3.5–5.1)
RBC # BLD: 3.98 M/UL (ref 4.7–6.1)
SLIDE REVIEW: ABNORMAL
SODIUM BLD-SCNC: 140 MEQ/L (ref 132–144)
T4 FREE: 1.11 NG/DL (ref 0.93–1.7)
TOTAL PROTEIN: 8.1 G/DL (ref 6.4–8.1)
TSH REFLEX: 6.76 UIU/ML (ref 0.27–4.2)
URIC ACID, SERUM: 5.6 MG/DL (ref 3.4–7)
WBC # BLD: 3.6 K/UL (ref 4.8–10.8)

## 2018-03-19 LAB
VITAMIN D2 AND D3, TOTAL: 22.6 NG/ML (ref 30–80)
VITAMIN D2, 25 HYDROXY: <1 NG/ML
VITAMIN D3,25 HYDROXY: 22.6 NG/ML

## 2018-03-20 ENCOUNTER — OFFICE VISIT (OUTPATIENT)
Dept: UROLOGY | Age: 73
End: 2018-03-20
Payer: MEDICARE

## 2018-03-20 VITALS
HEIGHT: 70 IN | DIASTOLIC BLOOD PRESSURE: 68 MMHG | WEIGHT: 170 LBS | HEART RATE: 72 BPM | BODY MASS INDEX: 24.34 KG/M2 | SYSTOLIC BLOOD PRESSURE: 130 MMHG

## 2018-03-20 DIAGNOSIS — N31.9 NEUROPATHIC BLADDER: Primary | ICD-10-CM

## 2018-03-20 PROCEDURE — 51703 INSERT BLADDER CATH COMPLEX: CPT | Performed by: UROLOGY

## 2018-03-20 PROCEDURE — 99999 PR OFFICE/OUTPT VISIT,PROCEDURE ONLY: CPT | Performed by: UROLOGY

## 2018-03-20 ASSESSMENT — PATIENT HEALTH QUESTIONNAIRE - PHQ9
SUM OF ALL RESPONSES TO PHQ9 QUESTIONS 1 & 2: 0
1. LITTLE INTEREST OR PLEASURE IN DOING THINGS: 0
2. FEELING DOWN, DEPRESSED OR HOPELESS: 0
SUM OF ALL RESPONSES TO PHQ QUESTIONS 1-9: 0

## 2018-03-22 ENCOUNTER — OFFICE VISIT (OUTPATIENT)
Dept: FAMILY MEDICINE CLINIC | Age: 73
End: 2018-03-22
Payer: MEDICARE

## 2018-03-22 VITALS
HEART RATE: 52 BPM | BODY MASS INDEX: 26.2 KG/M2 | WEIGHT: 183 LBS | RESPIRATION RATE: 12 BRPM | SYSTOLIC BLOOD PRESSURE: 122 MMHG | OXYGEN SATURATION: 98 % | HEIGHT: 70 IN | DIASTOLIC BLOOD PRESSURE: 80 MMHG | TEMPERATURE: 96.7 F

## 2018-03-22 DIAGNOSIS — E11.29 TYPE 2 DIABETES MELLITUS WITH MICROALBUMINURIA, WITHOUT LONG-TERM CURRENT USE OF INSULIN (HCC): Primary | ICD-10-CM

## 2018-03-22 DIAGNOSIS — Z87.898 HISTORY OF ASCITES: ICD-10-CM

## 2018-03-22 DIAGNOSIS — E79.0 HYPERURICEMIA: ICD-10-CM

## 2018-03-22 DIAGNOSIS — E03.4 HYPOTHYROIDISM DUE TO ACQUIRED ATROPHY OF THYROID: ICD-10-CM

## 2018-03-22 DIAGNOSIS — R80.9 TYPE 2 DIABETES MELLITUS WITH MICROALBUMINURIA, WITHOUT LONG-TERM CURRENT USE OF INSULIN (HCC): Primary | ICD-10-CM

## 2018-03-22 DIAGNOSIS — E55.9 VITAMIN D DEFICIENCY: ICD-10-CM

## 2018-03-22 DIAGNOSIS — I10 ESSENTIAL HYPERTENSION: ICD-10-CM

## 2018-03-22 PROCEDURE — 99214 OFFICE O/P EST MOD 30 MIN: CPT | Performed by: FAMILY MEDICINE

## 2018-03-22 RX ORDER — CHOLECALCIFEROL (VITAMIN D3) 50 MCG
2000 TABLET ORAL DAILY
Qty: 30 TABLET | COMMUNITY
Start: 2018-03-22

## 2018-03-22 RX ORDER — LISINOPRIL 2.5 MG/1
2.5 TABLET ORAL DAILY
Qty: 30 TABLET | Refills: 3 | Status: SHIPPED | OUTPATIENT
Start: 2018-03-22 | End: 2018-06-22 | Stop reason: SINTOL

## 2018-03-22 RX ORDER — LEVOTHYROXINE SODIUM 0.05 MG/1
50 TABLET ORAL DAILY
Qty: 30 TABLET | Refills: 3 | Status: SHIPPED | OUTPATIENT
Start: 2018-03-22 | End: 2018-07-25 | Stop reason: SDUPTHER

## 2018-03-22 RX ORDER — SPIRONOLACTONE 25 MG/1
25 TABLET ORAL DAILY
Qty: 30 TABLET | Refills: 5 | Status: SHIPPED | OUTPATIENT
Start: 2018-03-22 | End: 2018-10-01 | Stop reason: SDUPTHER

## 2018-03-22 ASSESSMENT — ENCOUNTER SYMPTOMS
EYES NEGATIVE: 1
RESPIRATORY NEGATIVE: 1
ALLERGIC/IMMUNOLOGIC NEGATIVE: 1
GASTROINTESTINAL NEGATIVE: 1

## 2018-04-02 RX ORDER — ALLOPURINOL 300 MG/1
300 TABLET ORAL DAILY
Qty: 30 TABLET | Refills: 11 | Status: SHIPPED | OUTPATIENT
Start: 2018-04-02

## 2018-04-20 ENCOUNTER — OFFICE VISIT (OUTPATIENT)
Dept: UROLOGY | Age: 73
End: 2018-04-20
Payer: MEDICARE

## 2018-04-20 VITALS
BODY MASS INDEX: 24.34 KG/M2 | HEIGHT: 70 IN | DIASTOLIC BLOOD PRESSURE: 70 MMHG | HEART RATE: 53 BPM | WEIGHT: 170 LBS | SYSTOLIC BLOOD PRESSURE: 110 MMHG

## 2018-04-20 DIAGNOSIS — N31.9 NEUROPATHIC BLADDER: Primary | ICD-10-CM

## 2018-04-20 PROCEDURE — 51703 INSERT BLADDER CATH COMPLEX: CPT | Performed by: UROLOGY

## 2018-04-20 PROCEDURE — 99999 PR OFFICE/OUTPT VISIT,PROCEDURE ONLY: CPT | Performed by: UROLOGY

## 2018-05-11 RX ORDER — FINASTERIDE 5 MG/1
5 TABLET, FILM COATED ORAL DAILY
Qty: 90 TABLET | Refills: 3 | Status: SHIPPED | OUTPATIENT
Start: 2018-05-11 | End: 2019-08-20

## 2018-05-22 ENCOUNTER — PROCEDURE VISIT (OUTPATIENT)
Dept: UROLOGY | Age: 73
End: 2018-05-22
Payer: MEDICARE

## 2018-05-22 VITALS
HEIGHT: 70 IN | DIASTOLIC BLOOD PRESSURE: 62 MMHG | WEIGHT: 170 LBS | BODY MASS INDEX: 24.34 KG/M2 | HEART RATE: 55 BPM | SYSTOLIC BLOOD PRESSURE: 126 MMHG

## 2018-05-22 DIAGNOSIS — N31.9 NEUROPATHIC BLADDER: Primary | ICD-10-CM

## 2018-05-22 PROCEDURE — 99999 PR OFFICE/OUTPT VISIT,PROCEDURE ONLY: CPT | Performed by: UROLOGY

## 2018-05-22 PROCEDURE — 51703 INSERT BLADDER CATH COMPLEX: CPT | Performed by: UROLOGY

## 2018-06-15 DIAGNOSIS — R80.9 TYPE 2 DIABETES MELLITUS WITH MICROALBUMINURIA, WITHOUT LONG-TERM CURRENT USE OF INSULIN (HCC): ICD-10-CM

## 2018-06-15 DIAGNOSIS — E55.9 VITAMIN D DEFICIENCY: ICD-10-CM

## 2018-06-15 DIAGNOSIS — I10 ESSENTIAL HYPERTENSION: ICD-10-CM

## 2018-06-15 DIAGNOSIS — E79.0 HYPERURICEMIA: ICD-10-CM

## 2018-06-15 DIAGNOSIS — E11.29 TYPE 2 DIABETES MELLITUS WITH MICROALBUMINURIA, WITHOUT LONG-TERM CURRENT USE OF INSULIN (HCC): ICD-10-CM

## 2018-06-15 DIAGNOSIS — E03.4 HYPOTHYROIDISM DUE TO ACQUIRED ATROPHY OF THYROID: ICD-10-CM

## 2018-06-15 LAB
ALBUMIN SERPL-MCNC: 4.6 G/DL (ref 3.9–4.9)
ALP BLD-CCNC: 97 U/L (ref 35–104)
ALT SERPL-CCNC: 10 U/L (ref 0–41)
ANION GAP SERPL CALCULATED.3IONS-SCNC: 16 MEQ/L (ref 7–13)
AST SERPL-CCNC: 20 U/L (ref 0–40)
BASOPHILS ABSOLUTE: 0 K/UL (ref 0–0.2)
BASOPHILS RELATIVE PERCENT: 1 %
BILIRUB SERPL-MCNC: 0.8 MG/DL (ref 0–1.2)
BUN BLDV-MCNC: 34 MG/DL (ref 8–23)
CALCIUM SERPL-MCNC: 10.1 MG/DL (ref 8.6–10.2)
CHLORIDE BLD-SCNC: 100 MEQ/L (ref 98–107)
CHOLESTEROL, TOTAL: 145 MG/DL (ref 0–199)
CO2: 22 MEQ/L (ref 22–29)
CREAT SERPL-MCNC: 1.25 MG/DL (ref 0.7–1.2)
CREATININE URINE: 199.7 MG/DL
EOSINOPHILS ABSOLUTE: 0.2 K/UL (ref 0–0.7)
EOSINOPHILS RELATIVE PERCENT: 4 %
GFR AFRICAN AMERICAN: >60
GFR NON-AFRICAN AMERICAN: 56.5
GLOBULIN: 3.7 G/DL (ref 2.3–3.5)
GLUCOSE BLD-MCNC: 101 MG/DL (ref 74–109)
HBA1C MFR BLD: 6.1 % (ref 4.8–5.9)
HCT VFR BLD CALC: 35.4 % (ref 42–52)
HDLC SERPL-MCNC: 34 MG/DL (ref 40–59)
HEMOGLOBIN: 11.3 G/DL (ref 14–18)
LDL CHOLESTEROL CALCULATED: 90 MG/DL (ref 0–129)
LYMPHOCYTES ABSOLUTE: 0.9 K/UL (ref 1–4.8)
LYMPHOCYTES RELATIVE PERCENT: 21.7 %
MAGNESIUM: 2.1 MG/DL (ref 1.7–2.3)
MCH RBC QN AUTO: 31.3 PG (ref 27–31.3)
MCHC RBC AUTO-ENTMCNC: 32 % (ref 33–37)
MCV RBC AUTO: 97.9 FL (ref 80–100)
MICROALBUMIN UR-MCNC: 76.9 MG/DL
MICROALBUMIN/CREAT UR-RTO: 385.1 MG/G (ref 0–30)
MONOCYTES ABSOLUTE: 0.3 K/UL (ref 0.2–0.8)
MONOCYTES RELATIVE PERCENT: 8.1 %
NEUTROPHILS ABSOLUTE: 2.7 K/UL (ref 1.4–6.5)
NEUTROPHILS RELATIVE PERCENT: 65.2 %
PDW BLD-RTO: 15.8 % (ref 11.5–14.5)
PLATELET # BLD: 167 K/UL (ref 130–400)
POTASSIUM SERPL-SCNC: 5.2 MEQ/L (ref 3.5–5.1)
RBC # BLD: 3.61 M/UL (ref 4.7–6.1)
SODIUM BLD-SCNC: 138 MEQ/L (ref 132–144)
T4 FREE: 1.34 NG/DL (ref 0.93–1.7)
TOTAL PROTEIN: 8.3 G/DL (ref 6.4–8.1)
TRIGL SERPL-MCNC: 105 MG/DL (ref 0–200)
TSH REFLEX: 1.43 UIU/ML (ref 0.27–4.2)
URIC ACID, SERUM: 3.9 MG/DL (ref 3.4–7)
WBC # BLD: 4.1 K/UL (ref 4.8–10.8)

## 2018-06-19 LAB
VITAMIN D2 AND D3, TOTAL: 37.3 NG/ML (ref 30–80)
VITAMIN D2, 25 HYDROXY: <1 NG/ML
VITAMIN D3,25 HYDROXY: 37.3 NG/ML

## 2018-06-22 ENCOUNTER — OFFICE VISIT (OUTPATIENT)
Dept: FAMILY MEDICINE CLINIC | Age: 73
End: 2018-06-22
Payer: MEDICARE

## 2018-06-22 VITALS
HEIGHT: 70 IN | HEART RATE: 54 BPM | WEIGHT: 174 LBS | SYSTOLIC BLOOD PRESSURE: 110 MMHG | BODY MASS INDEX: 24.91 KG/M2 | TEMPERATURE: 97.7 F | OXYGEN SATURATION: 99 % | RESPIRATION RATE: 12 BRPM | DIASTOLIC BLOOD PRESSURE: 62 MMHG

## 2018-06-22 DIAGNOSIS — E79.0 HYPERURICEMIA: ICD-10-CM

## 2018-06-22 DIAGNOSIS — E11.29 TYPE 2 DIABETES MELLITUS WITH MICROALBUMINURIA, WITHOUT LONG-TERM CURRENT USE OF INSULIN (HCC): Primary | ICD-10-CM

## 2018-06-22 DIAGNOSIS — R80.9 TYPE 2 DIABETES MELLITUS WITH MICROALBUMINURIA, WITHOUT LONG-TERM CURRENT USE OF INSULIN (HCC): Primary | ICD-10-CM

## 2018-06-22 DIAGNOSIS — E03.4 HYPOTHYROIDISM DUE TO ACQUIRED ATROPHY OF THYROID: ICD-10-CM

## 2018-06-22 DIAGNOSIS — I10 ESSENTIAL HYPERTENSION: ICD-10-CM

## 2018-06-22 PROCEDURE — 99214 OFFICE O/P EST MOD 30 MIN: CPT | Performed by: FAMILY MEDICINE

## 2018-06-22 ASSESSMENT — ENCOUNTER SYMPTOMS
GASTROINTESTINAL NEGATIVE: 1
ALLERGIC/IMMUNOLOGIC NEGATIVE: 1
RESPIRATORY NEGATIVE: 1
EYES NEGATIVE: 1

## 2018-07-10 ENCOUNTER — PROCEDURE VISIT (OUTPATIENT)
Dept: UROLOGY | Age: 73
End: 2018-07-10
Payer: MEDICARE

## 2018-07-10 VITALS
DIASTOLIC BLOOD PRESSURE: 62 MMHG | HEIGHT: 70 IN | HEART RATE: 55 BPM | WEIGHT: 170 LBS | BODY MASS INDEX: 24.34 KG/M2 | SYSTOLIC BLOOD PRESSURE: 110 MMHG

## 2018-07-10 DIAGNOSIS — N31.9 NEUROPATHIC BLADDER: Primary | ICD-10-CM

## 2018-07-10 PROCEDURE — 99999 PR OFFICE/OUTPT VISIT,PROCEDURE ONLY: CPT | Performed by: UROLOGY

## 2018-07-10 PROCEDURE — 51703 INSERT BLADDER CATH COMPLEX: CPT | Performed by: UROLOGY

## 2018-07-25 DIAGNOSIS — E03.4 HYPOTHYROIDISM DUE TO ACQUIRED ATROPHY OF THYROID: ICD-10-CM

## 2018-07-25 RX ORDER — LEVOTHYROXINE SODIUM 0.05 MG/1
50 TABLET ORAL DAILY
Qty: 30 TABLET | Refills: 3 | Status: SHIPPED | OUTPATIENT
Start: 2018-07-25 | End: 2018-12-10 | Stop reason: SDUPTHER

## 2018-08-14 ENCOUNTER — PROCEDURE VISIT (OUTPATIENT)
Dept: UROLOGY | Age: 73
End: 2018-08-14
Payer: MEDICARE

## 2018-08-14 VITALS
BODY MASS INDEX: 24.34 KG/M2 | SYSTOLIC BLOOD PRESSURE: 124 MMHG | HEIGHT: 70 IN | DIASTOLIC BLOOD PRESSURE: 64 MMHG | WEIGHT: 170 LBS | HEART RATE: 54 BPM

## 2018-08-14 DIAGNOSIS — N31.9 NEUROPATHIC BLADDER: Primary | ICD-10-CM

## 2018-08-14 PROCEDURE — 99999 PR OFFICE/OUTPT VISIT,PROCEDURE ONLY: CPT | Performed by: UROLOGY

## 2018-08-14 PROCEDURE — 51703 INSERT BLADDER CATH COMPLEX: CPT | Performed by: UROLOGY

## 2018-08-14 NOTE — PROGRESS NOTES
Urology  Neuropathic bladder with no sensation filling or urge to urinate  Monthly catheter change  Complex catheter change due to altered prostatic anatomy requiring coudé catheter  Procedure note  16 Kinyarwanda coudé catheter exchanged  Bladder irrigated to clear  No complications  Change by urologist  Dr Juanjo Vasquez

## 2018-09-11 ENCOUNTER — PROCEDURE VISIT (OUTPATIENT)
Dept: UROLOGY | Age: 73
End: 2018-09-11
Payer: MEDICARE

## 2018-09-11 VITALS
WEIGHT: 170 LBS | DIASTOLIC BLOOD PRESSURE: 68 MMHG | BODY MASS INDEX: 24.34 KG/M2 | SYSTOLIC BLOOD PRESSURE: 108 MMHG | HEART RATE: 64 BPM | HEIGHT: 70 IN

## 2018-09-11 DIAGNOSIS — N31.9 NEUROPATHIC BLADDER: Primary | ICD-10-CM

## 2018-09-11 PROCEDURE — 51703 INSERT BLADDER CATH COMPLEX: CPT | Performed by: UROLOGY

## 2018-09-11 PROCEDURE — 99999 PR OFFICE/OUTPT VISIT,PROCEDURE ONLY: CPT | Performed by: UROLOGY

## 2018-09-11 NOTE — PROGRESS NOTES
Urology  Neuropathic bladder with minimal sensation  BPH with obstruction and altered urethral anatomy requiring coudé catheter placement  Monthly changes of catheter in the office    Procedure note  Under sterile conditions 16 Persian coudé catheter removed and reinserted by urologist due to altered prostatic anatomy  Bladder irrigated to clear  Follow-up 1 month  Dr Reyes Sterling

## 2018-09-27 ENCOUNTER — TELEPHONE (OUTPATIENT)
Dept: FAMILY MEDICINE CLINIC | Age: 73
End: 2018-09-27

## 2018-10-01 DIAGNOSIS — Z87.898 HISTORY OF ASCITES: ICD-10-CM

## 2018-10-01 DIAGNOSIS — I10 ESSENTIAL HYPERTENSION: ICD-10-CM

## 2018-10-01 RX ORDER — SPIRONOLACTONE 25 MG/1
25 TABLET ORAL DAILY
Qty: 30 TABLET | Refills: 5 | Status: SHIPPED | OUTPATIENT
Start: 2018-10-01 | End: 2020-02-04

## 2018-10-09 ENCOUNTER — PROCEDURE VISIT (OUTPATIENT)
Dept: UROLOGY | Age: 73
End: 2018-10-09
Payer: MEDICARE

## 2018-10-09 VITALS
DIASTOLIC BLOOD PRESSURE: 62 MMHG | WEIGHT: 170 LBS | BODY MASS INDEX: 24.34 KG/M2 | SYSTOLIC BLOOD PRESSURE: 132 MMHG | HEIGHT: 70 IN | HEART RATE: 56 BPM

## 2018-10-09 DIAGNOSIS — N31.9 NEUROPATHIC BLADDER: Primary | ICD-10-CM

## 2018-10-09 PROCEDURE — 99999 PR OFFICE/OUTPT VISIT,PROCEDURE ONLY: CPT | Performed by: UROLOGY

## 2018-10-09 PROCEDURE — 51703 INSERT BLADDER CATH COMPLEX: CPT | Performed by: UROLOGY

## 2018-11-06 ENCOUNTER — PROCEDURE VISIT (OUTPATIENT)
Dept: UROLOGY | Age: 73
End: 2018-11-06
Payer: MEDICARE

## 2018-11-06 VITALS
BODY MASS INDEX: 24.34 KG/M2 | HEIGHT: 70 IN | DIASTOLIC BLOOD PRESSURE: 80 MMHG | WEIGHT: 170 LBS | HEART RATE: 83 BPM | SYSTOLIC BLOOD PRESSURE: 138 MMHG

## 2018-11-06 DIAGNOSIS — N13.8 BPH WITH URINARY OBSTRUCTION: ICD-10-CM

## 2018-11-06 DIAGNOSIS — N31.9 NEUROPATHIC BLADDER: Primary | ICD-10-CM

## 2018-11-06 DIAGNOSIS — N40.1 BPH WITH URINARY OBSTRUCTION: ICD-10-CM

## 2018-11-06 PROCEDURE — 52000 CYSTOURETHROSCOPY: CPT | Performed by: UROLOGY

## 2018-11-06 PROCEDURE — 99999 PR OFFICE/OUTPT VISIT,PROCEDURE ONLY: CPT | Performed by: UROLOGY

## 2018-11-06 PROCEDURE — 51725 SIMPLE CYSTOMETROGRAM: CPT | Performed by: UROLOGY

## 2018-11-06 NOTE — PROGRESS NOTES
Urology  Chronic urinary retention/hypotonia with recent episodes of discomfort with catheter indicating potential detrusor activity  Patient will undergo simple cystometrogram cystoscopy to see whether or not he is a candidate for a TURP      Procedure note  Simple cystometrogram  Simple cystometrogram was performed under gravity with manometer  Patient had discomfort at 250 mL  Patient unable to define urgency  No evidence of detrusor activity  Final diagnosis neurogenic bladder/neuropathy  Dr Verito May      Procedure note  Flexible cystoscopy  Normal penile urethra  Minimally enlarged median bar  Large lateral lobes  Inflammatory changes secondary to long-term indwelling Camacho catheter  No evidence of malignancy  Neurogenic bladder appearance  Camacho catheter replaced  Dr Verito May      Patient at this point not a candidate for laser TURP  We'll continue to monitor detrusor activity  Continue monthly catheter changes until that time  Dr Verito May

## 2018-12-04 ENCOUNTER — PROCEDURE VISIT (OUTPATIENT)
Dept: UROLOGY | Age: 73
End: 2018-12-04
Payer: MEDICARE

## 2018-12-04 VITALS
WEIGHT: 170 LBS | HEART RATE: 61 BPM | DIASTOLIC BLOOD PRESSURE: 80 MMHG | HEIGHT: 70 IN | SYSTOLIC BLOOD PRESSURE: 136 MMHG | BODY MASS INDEX: 24.34 KG/M2

## 2018-12-04 DIAGNOSIS — N31.9 NEUROPATHIC BLADDER: Primary | ICD-10-CM

## 2018-12-04 PROCEDURE — 99999 PR OFFICE/OUTPT VISIT,PROCEDURE ONLY: CPT | Performed by: UROLOGY

## 2018-12-04 PROCEDURE — 51703 INSERT BLADDER CATH COMPLEX: CPT | Performed by: UROLOGY

## 2018-12-10 DIAGNOSIS — E03.4 HYPOTHYROIDISM DUE TO ACQUIRED ATROPHY OF THYROID: ICD-10-CM

## 2018-12-10 RX ORDER — LEVOTHYROXINE SODIUM 0.05 MG/1
50 TABLET ORAL DAILY
Qty: 90 TABLET | Refills: 1 | Status: SHIPPED | OUTPATIENT
Start: 2018-12-10

## 2018-12-17 DIAGNOSIS — E11.29 TYPE 2 DIABETES MELLITUS WITH MICROALBUMINURIA, WITHOUT LONG-TERM CURRENT USE OF INSULIN (HCC): ICD-10-CM

## 2018-12-17 DIAGNOSIS — R80.9 TYPE 2 DIABETES MELLITUS WITH MICROALBUMINURIA, WITHOUT LONG-TERM CURRENT USE OF INSULIN (HCC): ICD-10-CM

## 2018-12-17 DIAGNOSIS — E79.0 HYPERURICEMIA: ICD-10-CM

## 2018-12-17 DIAGNOSIS — I10 ESSENTIAL HYPERTENSION: ICD-10-CM

## 2018-12-17 DIAGNOSIS — E03.4 HYPOTHYROIDISM DUE TO ACQUIRED ATROPHY OF THYROID: ICD-10-CM

## 2018-12-17 LAB
ALBUMIN SERPL-MCNC: 4.4 G/DL (ref 3.9–4.9)
ALP BLD-CCNC: 109 U/L (ref 35–104)
ALT SERPL-CCNC: 16 U/L (ref 0–41)
ANION GAP SERPL CALCULATED.3IONS-SCNC: 14 MEQ/L (ref 7–13)
ANISOCYTOSIS: ABNORMAL
AST SERPL-CCNC: 23 U/L (ref 0–40)
BANDED NEUTROPHILS RELATIVE PERCENT: 13 %
BASOPHILS ABSOLUTE: 0.1 K/UL (ref 0–0.2)
BASOPHILS RELATIVE PERCENT: 3 %
BILIRUB SERPL-MCNC: 0.8 MG/DL (ref 0–1.2)
BUN BLDV-MCNC: 13 MG/DL (ref 8–23)
CALCIUM SERPL-MCNC: 9.9 MG/DL (ref 8.6–10.2)
CHLORIDE BLD-SCNC: 101 MEQ/L (ref 98–107)
CHOLESTEROL, TOTAL: 163 MG/DL (ref 0–199)
CO2: 23 MEQ/L (ref 22–29)
CREAT SERPL-MCNC: 1.13 MG/DL (ref 0.7–1.2)
CREATININE URINE: 32.1 MG/DL
EOSINOPHILS ABSOLUTE: 0.1 K/UL (ref 0–0.7)
EOSINOPHILS RELATIVE PERCENT: 3 %
GFR AFRICAN AMERICAN: >60
GFR NON-AFRICAN AMERICAN: >60
GLOBULIN: 3.7 G/DL (ref 2.3–3.5)
GLUCOSE BLD-MCNC: 118 MG/DL (ref 74–109)
HBA1C MFR BLD: 6.1 % (ref 4.8–5.9)
HCT VFR BLD CALC: 40.3 % (ref 42–52)
HDLC SERPL-MCNC: 40 MG/DL (ref 40–59)
HEMOGLOBIN: 13.4 G/DL (ref 14–18)
LDL CHOLESTEROL CALCULATED: 95 MG/DL (ref 0–129)
LYMPHOCYTES ABSOLUTE: 1 K/UL (ref 1–4.8)
LYMPHOCYTES RELATIVE PERCENT: 27 %
MAGNESIUM: 2 MG/DL (ref 1.7–2.3)
MCH RBC QN AUTO: 31.1 PG (ref 27–31.3)
MCHC RBC AUTO-ENTMCNC: 33.2 % (ref 33–37)
MCV RBC AUTO: 93.7 FL (ref 80–100)
METAMYELOCYTES RELATIVE PERCENT: 1 %
MICROALBUMIN UR-MCNC: 56.2 MG/DL
MICROALBUMIN/CREAT UR-RTO: 1750.8 MG/G (ref 0–30)
MONOCYTES ABSOLUTE: 0.1 K/UL (ref 0.2–0.8)
MONOCYTES RELATIVE PERCENT: 3.9 %
NEUTROPHILS ABSOLUTE: 2.4 K/UL (ref 1.4–6.5)
NEUTROPHILS RELATIVE PERCENT: 50 %
PDW BLD-RTO: 15.6 % (ref 11.5–14.5)
PLATELET # BLD: 154 K/UL (ref 130–400)
PLATELET SLIDE REVIEW: NORMAL
POIKILOCYTES: ABNORMAL
POTASSIUM SERPL-SCNC: 4.7 MEQ/L (ref 3.5–5.1)
RBC # BLD: 4.31 M/UL (ref 4.7–6.1)
SODIUM BLD-SCNC: 138 MEQ/L (ref 132–144)
T4 FREE: 1.1 NG/DL (ref 0.93–1.7)
TOTAL PROTEIN: 8.1 G/DL (ref 6.4–8.1)
TRIGL SERPL-MCNC: 141 MG/DL (ref 0–200)
TSH REFLEX: 3.08 UIU/ML (ref 0.27–4.2)
URIC ACID, SERUM: 5.3 MG/DL (ref 3.4–7)
WBC # BLD: 3.7 K/UL (ref 4.8–10.8)

## 2018-12-24 ENCOUNTER — OFFICE VISIT (OUTPATIENT)
Dept: FAMILY MEDICINE CLINIC | Age: 73
End: 2018-12-24
Payer: MEDICARE

## 2018-12-24 VITALS
DIASTOLIC BLOOD PRESSURE: 80 MMHG | OXYGEN SATURATION: 99 % | TEMPERATURE: 97 F | RESPIRATION RATE: 12 BRPM | HEIGHT: 70 IN | WEIGHT: 180 LBS | HEART RATE: 63 BPM | SYSTOLIC BLOOD PRESSURE: 118 MMHG | BODY MASS INDEX: 25.77 KG/M2

## 2018-12-24 DIAGNOSIS — R80.9 TYPE 2 DIABETES MELLITUS WITH MICROALBUMINURIA, WITHOUT LONG-TERM CURRENT USE OF INSULIN (HCC): Primary | ICD-10-CM

## 2018-12-24 DIAGNOSIS — E03.4 HYPOTHYROIDISM DUE TO ACQUIRED ATROPHY OF THYROID: ICD-10-CM

## 2018-12-24 DIAGNOSIS — Z23 NEED FOR 23-POLYVALENT PNEUMOCOCCAL POLYSACCHARIDE VACCINE: ICD-10-CM

## 2018-12-24 DIAGNOSIS — E11.29 TYPE 2 DIABETES MELLITUS WITH MICROALBUMINURIA, WITHOUT LONG-TERM CURRENT USE OF INSULIN (HCC): Primary | ICD-10-CM

## 2018-12-24 DIAGNOSIS — Z23 NEEDS FLU SHOT: ICD-10-CM

## 2018-12-24 DIAGNOSIS — I10 ESSENTIAL HYPERTENSION: ICD-10-CM

## 2018-12-24 PROCEDURE — 99214 OFFICE O/P EST MOD 30 MIN: CPT | Performed by: FAMILY MEDICINE

## 2018-12-24 PROCEDURE — G0009 ADMIN PNEUMOCOCCAL VACCINE: HCPCS | Performed by: FAMILY MEDICINE

## 2018-12-24 PROCEDURE — 90732 PPSV23 VACC 2 YRS+ SUBQ/IM: CPT | Performed by: FAMILY MEDICINE

## 2018-12-24 PROCEDURE — G0008 ADMIN INFLUENZA VIRUS VAC: HCPCS | Performed by: FAMILY MEDICINE

## 2018-12-24 PROCEDURE — 90662 IIV NO PRSV INCREASED AG IM: CPT | Performed by: FAMILY MEDICINE

## 2018-12-24 ASSESSMENT — PATIENT HEALTH QUESTIONNAIRE - PHQ9
SUM OF ALL RESPONSES TO PHQ9 QUESTIONS 1 & 2: 0
SUM OF ALL RESPONSES TO PHQ QUESTIONS 1-9: 0
SUM OF ALL RESPONSES TO PHQ QUESTIONS 1-9: 0
1. LITTLE INTEREST OR PLEASURE IN DOING THINGS: 0
DEPRESSION UNABLE TO ASSESS: FUNCTIONAL CAPACITY MOTIVATION LIMITS ACCURACY
2. FEELING DOWN, DEPRESSED OR HOPELESS: 0

## 2018-12-24 NOTE — PROGRESS NOTES
Vaccine Information Sheet, \"Influenza - Inactivated\"  given to Jillene Frankel, or parent/legal guardian of  Jillene Frankel and verbalized understanding. Patient responses:    Have you ever had a reaction to a flu vaccine? NO  Are you able to eat eggs without adverse effects? YES  Do you have any current illness? NO  Have you ever had Guillian [place] Syndrome? NO    Flu vaccine given per order. Please see immunization tab.

## 2019-01-08 ENCOUNTER — PROCEDURE VISIT (OUTPATIENT)
Dept: UROLOGY | Age: 74
End: 2019-01-08
Payer: MEDICARE

## 2019-01-08 VITALS
HEART RATE: 59 BPM | BODY MASS INDEX: 24.34 KG/M2 | SYSTOLIC BLOOD PRESSURE: 114 MMHG | DIASTOLIC BLOOD PRESSURE: 62 MMHG | WEIGHT: 170 LBS | HEIGHT: 70 IN

## 2019-01-08 DIAGNOSIS — N31.9 NEUROPATHIC BLADDER: Primary | ICD-10-CM

## 2019-01-08 PROCEDURE — 99999 PR OFFICE/OUTPT VISIT,PROCEDURE ONLY: CPT | Performed by: UROLOGY

## 2019-01-08 PROCEDURE — 51703 INSERT BLADDER CATH COMPLEX: CPT | Performed by: UROLOGY

## 2019-01-08 RX ORDER — TAMSULOSIN HYDROCHLORIDE 0.4 MG/1
0.4 CAPSULE ORAL DAILY
Qty: 90 CAPSULE | Refills: 3 | Status: SHIPPED | OUTPATIENT
Start: 2019-01-08 | End: 2020-07-14

## 2019-01-15 RX ORDER — METFORMIN HYDROCHLORIDE 500 MG/1
500 TABLET, EXTENDED RELEASE ORAL
Qty: 30 TABLET | Refills: 5 | Status: SHIPPED | OUTPATIENT
Start: 2019-01-15

## 2019-02-12 ENCOUNTER — PROCEDURE VISIT (OUTPATIENT)
Dept: UROLOGY | Age: 74
End: 2019-02-12
Payer: MEDICARE

## 2019-02-12 VITALS
WEIGHT: 170 LBS | HEART RATE: 58 BPM | HEIGHT: 70 IN | SYSTOLIC BLOOD PRESSURE: 120 MMHG | DIASTOLIC BLOOD PRESSURE: 78 MMHG | BODY MASS INDEX: 24.34 KG/M2

## 2019-02-12 DIAGNOSIS — N31.9 NEUROPATHIC BLADDER: Primary | ICD-10-CM

## 2019-02-12 PROCEDURE — 99999 PR OFFICE/OUTPT VISIT,PROCEDURE ONLY: CPT | Performed by: UROLOGY

## 2019-02-12 PROCEDURE — 51703 INSERT BLADDER CATH COMPLEX: CPT | Performed by: UROLOGY

## 2019-03-12 ENCOUNTER — TELEPHONE (OUTPATIENT)
Dept: FAMILY MEDICINE CLINIC | Age: 74
End: 2019-03-12

## 2019-03-19 ENCOUNTER — PROCEDURE VISIT (OUTPATIENT)
Dept: UROLOGY | Age: 74
End: 2019-03-19
Payer: MEDICARE

## 2019-03-19 ENCOUNTER — TELEPHONE (OUTPATIENT)
Dept: ADMINISTRATIVE | Age: 74
End: 2019-03-19

## 2019-03-19 VITALS
DIASTOLIC BLOOD PRESSURE: 86 MMHG | WEIGHT: 175 LBS | SYSTOLIC BLOOD PRESSURE: 136 MMHG | BODY MASS INDEX: 25.05 KG/M2 | HEART RATE: 69 BPM | HEIGHT: 70 IN

## 2019-03-19 DIAGNOSIS — N31.9 NEUROPATHIC BLADDER: Primary | ICD-10-CM

## 2019-03-19 PROCEDURE — 51703 INSERT BLADDER CATH COMPLEX: CPT | Performed by: UROLOGY

## 2019-03-19 PROCEDURE — 99999 PR OFFICE/OUTPT VISIT,PROCEDURE ONLY: CPT | Performed by: UROLOGY

## 2019-04-23 ENCOUNTER — PROCEDURE VISIT (OUTPATIENT)
Dept: UROLOGY | Age: 74
End: 2019-04-23
Payer: MEDICARE

## 2019-04-23 VITALS
WEIGHT: 175 LBS | HEART RATE: 59 BPM | SYSTOLIC BLOOD PRESSURE: 120 MMHG | BODY MASS INDEX: 25.05 KG/M2 | HEIGHT: 70 IN | DIASTOLIC BLOOD PRESSURE: 80 MMHG

## 2019-04-23 DIAGNOSIS — N31.9 NEUROPATHIC BLADDER: Primary | ICD-10-CM

## 2019-04-23 PROCEDURE — 51703 INSERT BLADDER CATH COMPLEX: CPT | Performed by: UROLOGY

## 2019-04-23 NOTE — PROGRESS NOTES
Urology    Neuropathic bladder with chronic indwelling Camacho change with coudé catheter complex catheter change due to abnormal prostate anatomy  Patient continues to have no sensation of filling  Procedure note  25 Mauritanian coudé catheter exchanged under sterile conditions with irrigation of bladder performed by urologist  Follow-up 1 month  Dr Saurav Riddle

## 2019-05-21 ENCOUNTER — PROCEDURE VISIT (OUTPATIENT)
Dept: UROLOGY | Age: 74
End: 2019-05-21
Payer: MEDICARE

## 2019-05-21 VITALS
HEART RATE: 55 BPM | BODY MASS INDEX: 25.05 KG/M2 | HEIGHT: 70 IN | WEIGHT: 175 LBS | DIASTOLIC BLOOD PRESSURE: 82 MMHG | SYSTOLIC BLOOD PRESSURE: 118 MMHG

## 2019-05-21 DIAGNOSIS — N31.9 NEUROPATHIC BLADDER: Primary | ICD-10-CM

## 2019-05-21 PROCEDURE — 51703 INSERT BLADDER CATH COMPLEX: CPT | Performed by: UROLOGY

## 2019-05-21 NOTE — PROGRESS NOTES
Urology  Neurogenic bladder with chronic indwelling Camacho complex catheter change due to altered prostate anatomy  Procedure note  16 Telugu coudé exchange by urologist  Follow-up 1 month  Dr Pablo Martinez

## 2019-06-25 ENCOUNTER — PROCEDURE VISIT (OUTPATIENT)
Dept: UROLOGY | Age: 74
End: 2019-06-25
Payer: MEDICARE

## 2019-06-25 VITALS
BODY MASS INDEX: 25.05 KG/M2 | SYSTOLIC BLOOD PRESSURE: 134 MMHG | DIASTOLIC BLOOD PRESSURE: 70 MMHG | HEART RATE: 54 BPM | WEIGHT: 175 LBS | HEIGHT: 70 IN

## 2019-06-25 DIAGNOSIS — N31.9 NEUROPATHIC BLADDER: Primary | ICD-10-CM

## 2019-06-25 PROCEDURE — 51703 INSERT BLADDER CATH COMPLEX: CPT | Performed by: UROLOGY

## 2019-07-23 ENCOUNTER — PROCEDURE VISIT (OUTPATIENT)
Dept: UROLOGY | Age: 74
End: 2019-07-23
Payer: MEDICARE

## 2019-07-23 VITALS
HEIGHT: 70 IN | SYSTOLIC BLOOD PRESSURE: 132 MMHG | WEIGHT: 175 LBS | BODY MASS INDEX: 25.05 KG/M2 | DIASTOLIC BLOOD PRESSURE: 72 MMHG | HEART RATE: 51 BPM

## 2019-07-23 DIAGNOSIS — N31.9 NEUROPATHIC BLADDER: Primary | ICD-10-CM

## 2019-07-23 PROCEDURE — 51703 INSERT BLADDER CATH COMPLEX: CPT | Performed by: UROLOGY

## 2019-08-20 ENCOUNTER — PROCEDURE VISIT (OUTPATIENT)
Dept: UROLOGY | Age: 74
End: 2019-08-20

## 2019-08-20 VITALS
SYSTOLIC BLOOD PRESSURE: 118 MMHG | DIASTOLIC BLOOD PRESSURE: 72 MMHG | BODY MASS INDEX: 25.05 KG/M2 | WEIGHT: 175 LBS | HEART RATE: 53 BPM | HEIGHT: 70 IN

## 2019-08-20 DIAGNOSIS — N31.9 NEUROPATHIC BLADDER: Primary | ICD-10-CM

## 2019-08-20 RX ORDER — FINASTERIDE 5 MG/1
5 TABLET, FILM COATED ORAL DAILY
Qty: 90 TABLET | Refills: 3 | Status: SHIPPED | OUTPATIENT
Start: 2019-08-20 | End: 2020-10-19 | Stop reason: SDUPTHER

## 2019-08-20 NOTE — PROGRESS NOTES
Urology  Neurogenic bladder with monthly catheter change  Complex catheter change due to altered anatomy  Under sterile conditions catheter changed  Dr. Nissa Francisco

## 2019-09-24 ENCOUNTER — PROCEDURE VISIT (OUTPATIENT)
Dept: UROLOGY | Age: 74
End: 2019-09-24
Payer: MEDICARE

## 2019-09-24 VITALS
BODY MASS INDEX: 25.05 KG/M2 | HEART RATE: 54 BPM | DIASTOLIC BLOOD PRESSURE: 62 MMHG | WEIGHT: 175 LBS | SYSTOLIC BLOOD PRESSURE: 126 MMHG | HEIGHT: 70 IN

## 2019-09-24 DIAGNOSIS — N31.9 NEUROPATHIC BLADDER: Primary | ICD-10-CM

## 2019-09-24 PROCEDURE — 51703 INSERT BLADDER CATH COMPLEX: CPT | Performed by: UROLOGY

## 2019-10-29 ENCOUNTER — PROCEDURE VISIT (OUTPATIENT)
Dept: UROLOGY | Age: 74
End: 2019-10-29
Payer: MEDICARE

## 2019-10-29 VITALS
HEART RATE: 59 BPM | BODY MASS INDEX: 25.05 KG/M2 | SYSTOLIC BLOOD PRESSURE: 138 MMHG | WEIGHT: 175 LBS | HEIGHT: 70 IN | DIASTOLIC BLOOD PRESSURE: 70 MMHG

## 2019-10-29 DIAGNOSIS — N31.9 NEUROPATHIC BLADDER: Primary | ICD-10-CM

## 2019-10-29 PROCEDURE — 51703 INSERT BLADDER CATH COMPLEX: CPT | Performed by: UROLOGY

## 2019-12-03 ENCOUNTER — PROCEDURE VISIT (OUTPATIENT)
Dept: UROLOGY | Age: 74
End: 2019-12-03
Payer: MEDICARE

## 2019-12-03 VITALS
HEART RATE: 61 BPM | DIASTOLIC BLOOD PRESSURE: 68 MMHG | WEIGHT: 175 LBS | SYSTOLIC BLOOD PRESSURE: 128 MMHG | HEIGHT: 70 IN | BODY MASS INDEX: 25.05 KG/M2

## 2019-12-03 DIAGNOSIS — N31.9 NEUROPATHIC BLADDER: Primary | ICD-10-CM

## 2019-12-03 PROCEDURE — 51703 INSERT BLADDER CATH COMPLEX: CPT | Performed by: UROLOGY

## 2020-01-03 ENCOUNTER — PROCEDURE VISIT (OUTPATIENT)
Dept: UROLOGY | Age: 75
End: 2020-01-03
Payer: MEDICARE

## 2020-01-03 VITALS
HEART RATE: 68 BPM | SYSTOLIC BLOOD PRESSURE: 138 MMHG | DIASTOLIC BLOOD PRESSURE: 70 MMHG | HEIGHT: 70 IN | BODY MASS INDEX: 25.05 KG/M2 | WEIGHT: 175 LBS

## 2020-01-03 PROCEDURE — 51703 INSERT BLADDER CATH COMPLEX: CPT | Performed by: UROLOGY

## 2020-01-03 NOTE — PROGRESS NOTES
Urology  Neurogenic bladder chronic indwelling Camacho here for monthly change  Complex catheter change requiring coudé catheter due to altered prostate anatomy    Procedure note  18 Estonian coudé catheter exchanged  Bladder irrigated to clear  Patient continues to have no sensation of bladder filling  Amy Merrill MD

## 2020-02-04 ENCOUNTER — PROCEDURE VISIT (OUTPATIENT)
Dept: UROLOGY | Age: 75
End: 2020-02-04
Payer: MEDICARE

## 2020-02-04 VITALS
WEIGHT: 175 LBS | BODY MASS INDEX: 25.05 KG/M2 | SYSTOLIC BLOOD PRESSURE: 122 MMHG | HEIGHT: 70 IN | HEART RATE: 58 BPM | DIASTOLIC BLOOD PRESSURE: 60 MMHG

## 2020-02-04 PROCEDURE — 51703 INSERT BLADDER CATH COMPLEX: CPT | Performed by: UROLOGY

## 2020-02-04 RX ORDER — SPIRONOLACTONE 50 MG/1
TABLET, FILM COATED ORAL
COMMUNITY
Start: 2020-01-21 | End: 2020-05-06 | Stop reason: SDUPTHER

## 2020-03-03 ENCOUNTER — PROCEDURE VISIT (OUTPATIENT)
Dept: UROLOGY | Age: 75
End: 2020-03-03
Payer: MEDICARE

## 2020-03-03 VITALS
WEIGHT: 175 LBS | DIASTOLIC BLOOD PRESSURE: 74 MMHG | HEART RATE: 67 BPM | SYSTOLIC BLOOD PRESSURE: 124 MMHG | HEIGHT: 70 IN | BODY MASS INDEX: 25.05 KG/M2

## 2020-03-03 PROCEDURE — 51703 INSERT BLADDER CATH COMPLEX: CPT | Performed by: UROLOGY

## 2020-03-17 RX ORDER — TAMSULOSIN HYDROCHLORIDE 0.4 MG/1
0.4 CAPSULE ORAL DAILY
Qty: 90 CAPSULE | Refills: 3 | Status: SHIPPED | OUTPATIENT
Start: 2020-03-17 | End: 2021-03-23 | Stop reason: ALTCHOICE

## 2020-04-09 ENCOUNTER — PROCEDURE VISIT (OUTPATIENT)
Dept: UROLOGY | Age: 75
End: 2020-04-09
Payer: MEDICARE

## 2020-04-09 VITALS
WEIGHT: 175 LBS | DIASTOLIC BLOOD PRESSURE: 70 MMHG | BODY MASS INDEX: 25.05 KG/M2 | SYSTOLIC BLOOD PRESSURE: 122 MMHG | HEART RATE: 59 BPM | HEIGHT: 70 IN

## 2020-04-09 PROCEDURE — 51703 INSERT BLADDER CATH COMPLEX: CPT | Performed by: UROLOGY

## 2020-04-09 RX ORDER — SPIRONOLACTONE 25 MG/1
TABLET ORAL
COMMUNITY
Start: 2020-03-17 | End: 2021-06-29

## 2020-05-13 DIAGNOSIS — K70.31 ALCOHOLIC CIRRHOSIS OF LIVER WITH ASCITES (HCC): ICD-10-CM

## 2020-05-13 LAB
ANION GAP SERPL CALCULATED.3IONS-SCNC: 12 MEQ/L (ref 9–15)
BUN BLDV-MCNC: 14 MG/DL (ref 8–23)
CALCIUM SERPL-MCNC: 9.7 MG/DL (ref 8.5–9.9)
CHLORIDE BLD-SCNC: 102 MEQ/L (ref 95–107)
CO2: 24 MEQ/L (ref 20–31)
CREAT SERPL-MCNC: 1.01 MG/DL (ref 0.7–1.2)
GFR AFRICAN AMERICAN: >60
GFR NON-AFRICAN AMERICAN: >60
GLUCOSE BLD-MCNC: 141 MG/DL (ref 70–99)
POTASSIUM SERPL-SCNC: 4 MEQ/L (ref 3.4–4.9)
SODIUM BLD-SCNC: 138 MEQ/L (ref 135–144)

## 2020-05-14 ENCOUNTER — PROCEDURE VISIT (OUTPATIENT)
Dept: UROLOGY | Age: 75
End: 2020-05-14
Payer: MEDICARE

## 2020-05-14 VITALS
BODY MASS INDEX: 25.05 KG/M2 | HEART RATE: 60 BPM | WEIGHT: 175 LBS | DIASTOLIC BLOOD PRESSURE: 70 MMHG | SYSTOLIC BLOOD PRESSURE: 120 MMHG | HEIGHT: 70 IN

## 2020-05-14 PROCEDURE — 51703 INSERT BLADDER CATH COMPLEX: CPT | Performed by: UROLOGY

## 2020-05-15 RX ORDER — SPIRONOLACTONE 50 MG/1
50 TABLET, FILM COATED ORAL DAILY
Qty: 60 TABLET | Refills: 3 | Status: SHIPPED | OUTPATIENT
Start: 2020-05-15 | End: 2021-02-15 | Stop reason: SDUPTHER

## 2020-06-16 ENCOUNTER — PROCEDURE VISIT (OUTPATIENT)
Dept: UROLOGY | Age: 75
End: 2020-06-16
Payer: MEDICARE

## 2020-06-16 VITALS
HEART RATE: 62 BPM | OXYGEN SATURATION: 100 % | SYSTOLIC BLOOD PRESSURE: 110 MMHG | DIASTOLIC BLOOD PRESSURE: 72 MMHG | WEIGHT: 175 LBS | HEIGHT: 70 IN | BODY MASS INDEX: 25.05 KG/M2

## 2020-06-16 PROCEDURE — 51703 INSERT BLADDER CATH COMPLEX: CPT | Performed by: UROLOGY

## 2020-07-14 ENCOUNTER — PROCEDURE VISIT (OUTPATIENT)
Dept: UROLOGY | Age: 75
End: 2020-07-14
Payer: MEDICARE

## 2020-07-14 VITALS
HEIGHT: 70 IN | BODY MASS INDEX: 25.05 KG/M2 | WEIGHT: 175 LBS | HEART RATE: 58 BPM | DIASTOLIC BLOOD PRESSURE: 70 MMHG | SYSTOLIC BLOOD PRESSURE: 120 MMHG

## 2020-07-14 PROCEDURE — 51703 INSERT BLADDER CATH COMPLEX: CPT | Performed by: UROLOGY

## 2020-07-14 NOTE — PROGRESS NOTES
Urology  Neurogenic bladder with chronic indwelling Camacho  Enlarged prostate with obstruction  Patient denies any sensation to this day of filling or urgency  Complex catheter change requiring coudé catheter     Procedure note  25 Yakut coudé catheter exchanged under sterile conditions  Removed catheter showed minimal calcifications  Bladder irrigated to clear  Follow-up 1 month  Halle Person MD

## 2020-08-11 ENCOUNTER — PROCEDURE VISIT (OUTPATIENT)
Dept: UROLOGY | Age: 75
End: 2020-08-11
Payer: MEDICARE

## 2020-08-11 VITALS
HEART RATE: 59 BPM | HEIGHT: 70 IN | WEIGHT: 175 LBS | SYSTOLIC BLOOD PRESSURE: 118 MMHG | DIASTOLIC BLOOD PRESSURE: 60 MMHG | BODY MASS INDEX: 25.05 KG/M2

## 2020-08-11 PROCEDURE — 51703 INSERT BLADDER CATH COMPLEX: CPT | Performed by: UROLOGY

## 2020-09-14 ENCOUNTER — PROCEDURE VISIT (OUTPATIENT)
Dept: UROLOGY | Age: 75
End: 2020-09-14
Payer: MEDICARE

## 2020-09-14 VITALS
HEART RATE: 70 BPM | BODY MASS INDEX: 25.05 KG/M2 | HEIGHT: 70 IN | WEIGHT: 175 LBS | SYSTOLIC BLOOD PRESSURE: 130 MMHG | DIASTOLIC BLOOD PRESSURE: 70 MMHG

## 2020-09-14 PROCEDURE — 99999 PR OFFICE/OUTPT VISIT,PROCEDURE ONLY: CPT | Performed by: UROLOGY

## 2020-09-14 PROCEDURE — 52000 CYSTOURETHROSCOPY: CPT | Performed by: UROLOGY

## 2020-10-16 ENCOUNTER — PROCEDURE VISIT (OUTPATIENT)
Dept: UROLOGY | Age: 75
End: 2020-10-16
Payer: MEDICARE

## 2020-10-16 VITALS
BODY MASS INDEX: 25.05 KG/M2 | WEIGHT: 175 LBS | HEART RATE: 69 BPM | SYSTOLIC BLOOD PRESSURE: 138 MMHG | HEIGHT: 70 IN | DIASTOLIC BLOOD PRESSURE: 80 MMHG

## 2020-10-16 PROCEDURE — 51703 INSERT BLADDER CATH COMPLEX: CPT | Performed by: UROLOGY

## 2020-10-16 PROCEDURE — 99999 PR OFFICE/OUTPT VISIT,PROCEDURE ONLY: CPT | Performed by: UROLOGY

## 2020-10-16 NOTE — PROGRESS NOTES
Urology  Neurogenic bladder with chronic indwelling Camacho changed monthly  Having some bladder spasms  Having issues with this months catheter    Complex catheter change requiring coudé catheter  Bladder irrigated to clear  Follow-up 1 month for catheter change  Reyes Mesa MD

## 2020-10-19 RX ORDER — FINASTERIDE 5 MG/1
5 TABLET, FILM COATED ORAL DAILY
Qty: 90 TABLET | Refills: 3 | Status: SHIPPED | OUTPATIENT
Start: 2020-10-19

## 2020-11-17 ENCOUNTER — PROCEDURE VISIT (OUTPATIENT)
Dept: UROLOGY | Age: 75
End: 2020-11-17
Payer: MEDICARE

## 2020-11-17 VITALS
DIASTOLIC BLOOD PRESSURE: 78 MMHG | WEIGHT: 175 LBS | HEART RATE: 76 BPM | HEIGHT: 70 IN | BODY MASS INDEX: 25.05 KG/M2 | SYSTOLIC BLOOD PRESSURE: 130 MMHG

## 2020-11-17 PROCEDURE — 99999 PR OFFICE/OUTPT VISIT,PROCEDURE ONLY: CPT | Performed by: UROLOGY

## 2020-11-17 PROCEDURE — 51703 INSERT BLADDER CATH COMPLEX: CPT | Performed by: UROLOGY

## 2020-11-17 NOTE — PROGRESS NOTES
Urology  Neurogenic bladder with low capacity bladder with spasticity  Patient having some bladder spasms  Cystoscopy done 9/14/2020 showed minimal capacity no evidence of malignancy patient was unable to void  Patient had minimal obstruction at the level of the prostate  Catheter will be changed    Procedure note  Complex catheter change  18 Albanian coudé catheter changed under sterile conditions  Bladder irrigated to clear  Catheter is encrusted will change it 3 weeks instead of 4  eClso Dominguez MD

## 2020-12-08 ENCOUNTER — PROCEDURE VISIT (OUTPATIENT)
Dept: UROLOGY | Age: 75
End: 2020-12-08
Payer: MEDICARE

## 2020-12-08 VITALS
HEIGHT: 70 IN | BODY MASS INDEX: 25.05 KG/M2 | SYSTOLIC BLOOD PRESSURE: 124 MMHG | DIASTOLIC BLOOD PRESSURE: 62 MMHG | WEIGHT: 175 LBS | HEART RATE: 64 BPM

## 2020-12-08 PROCEDURE — 99999 PR OFFICE/OUTPT VISIT,PROCEDURE ONLY: CPT | Performed by: UROLOGY

## 2020-12-08 PROCEDURE — 51703 INSERT BLADDER CATH COMPLEX: CPT | Performed by: UROLOGY

## 2020-12-08 NOTE — PROGRESS NOTES
Urology  Neurogenic bladder with low capacity bladder and spasticity  Patient has been scheduled every 3 weeks for catheter change due to severe encrustation  Patient had a recent trial of void and cystometrogram with cystoscopy which she failed  Patient continues have some discomfort with catheter in the form of spasms    Procedure note  Complex catheter change  18 Romansh coudé catheter exchange under sterile conditions  Some encrustation noted  Bladder irrigated to clear  Follow-up 3 weeks for Camacho catheter change  Yariel Gallardo MD

## 2020-12-29 ENCOUNTER — PROCEDURE VISIT (OUTPATIENT)
Dept: UROLOGY | Age: 75
End: 2020-12-29
Payer: MEDICARE

## 2020-12-29 VITALS
WEIGHT: 175 LBS | HEART RATE: 65 BPM | BODY MASS INDEX: 25.05 KG/M2 | HEIGHT: 70 IN | SYSTOLIC BLOOD PRESSURE: 132 MMHG | DIASTOLIC BLOOD PRESSURE: 70 MMHG

## 2020-12-29 PROCEDURE — 51703 INSERT BLADDER CATH COMPLEX: CPT | Performed by: UROLOGY

## 2020-12-29 NOTE — PROGRESS NOTES
Urology  Neurogenic bladder with low capacity bladder and spasticity  Patient has been scheduled every 3 weeks for catheter change due to severe encrustation  Patient had a recent trial of void and cystometrogram with cystoscopy which he failed  Patient continues have some discomfort with catheter in the form of spasms     Procedure note  Complex catheter change  18 Amharic coudé catheter exchange under sterile conditions 10 cc in the balloon  Some encrustation noted level of encrustation unchanged  Bladder irrigated to clear  Follow-up 3 weeks for Camacho catheter change  Lissy Tsai MD

## 2021-01-19 ENCOUNTER — PROCEDURE VISIT (OUTPATIENT)
Dept: UROLOGY | Age: 76
End: 2021-01-19
Payer: MEDICARE

## 2021-01-19 VITALS
HEIGHT: 70 IN | DIASTOLIC BLOOD PRESSURE: 76 MMHG | HEART RATE: 61 BPM | SYSTOLIC BLOOD PRESSURE: 116 MMHG | WEIGHT: 175 LBS | BODY MASS INDEX: 25.05 KG/M2

## 2021-01-19 DIAGNOSIS — N31.9 NEUROPATHIC BLADDER: Primary | ICD-10-CM

## 2021-01-19 PROCEDURE — 99999 PR OFFICE/OUTPT VISIT,PROCEDURE ONLY: CPT | Performed by: UROLOGY

## 2021-01-19 PROCEDURE — 51703 INSERT BLADDER CATH COMPLEX: CPT | Performed by: UROLOGY

## 2021-01-19 NOTE — PROGRESS NOTES
Urology  Here for catheter change which is done every 3 weeks due to encrustation  Neurogenic bladder  Complex catheter change due to enlarged prostate requiring coudé catheter      Procedure note  Complex catheter change using 18 coudé catheter  Mild encrustation noted  Bladder irrigated to clear  Follow-up 3 weeks  Concetta Po MD

## 2021-02-09 ENCOUNTER — PROCEDURE VISIT (OUTPATIENT)
Dept: UROLOGY | Age: 76
End: 2021-02-09
Payer: MEDICARE

## 2021-02-09 VITALS
SYSTOLIC BLOOD PRESSURE: 134 MMHG | HEART RATE: 67 BPM | DIASTOLIC BLOOD PRESSURE: 72 MMHG | WEIGHT: 175 LBS | HEIGHT: 70 IN | BODY MASS INDEX: 25.05 KG/M2

## 2021-02-09 DIAGNOSIS — N31.9 NEUROPATHIC BLADDER: Primary | ICD-10-CM

## 2021-02-09 PROCEDURE — 99999 PR OFFICE/OUTPT VISIT,PROCEDURE ONLY: CPT | Performed by: UROLOGY

## 2021-02-09 PROCEDURE — 51703 INSERT BLADDER CATH COMPLEX: CPT | Performed by: UROLOGY

## 2021-02-09 NOTE — PROGRESS NOTES
Chaperone for Intimate Exam    1. Was chaperone offered as part of the rooming process? offered, declined   2. If Chaperone is declined by patient, NA: chaperone was available and exam completed  3.  Chaperone is n/

## 2021-02-15 RX ORDER — SPIRONOLACTONE 50 MG/1
50 TABLET, FILM COATED ORAL DAILY
Qty: 60 TABLET | Refills: 3 | Status: SHIPPED | OUTPATIENT
Start: 2021-02-15 | End: 2021-09-28 | Stop reason: SDUPTHER

## 2021-02-15 NOTE — TELEPHONE ENCOUNTER
Patient  requesting medication refill.  Please approve or deny this request.    Rx requested:  Requested Prescriptions     Pending Prescriptions Disp Refills    spironolactone (ALDACTONE) 50 MG tablet 60 tablet 3     Sig: Take 1 tablet by mouth daily         Last Office Visit:   Visit date not found      Next Visit Date:  Future Appointments   Date Time Provider Lasha Vera   3/2/2021  8:15 AM Soy Hidalgo MD [facility]

## 2021-03-02 ENCOUNTER — PROCEDURE VISIT (OUTPATIENT)
Dept: UROLOGY | Age: 76
End: 2021-03-02
Payer: MEDICARE

## 2021-03-02 VITALS
BODY MASS INDEX: 25.05 KG/M2 | HEIGHT: 70 IN | WEIGHT: 175 LBS | DIASTOLIC BLOOD PRESSURE: 60 MMHG | HEART RATE: 70 BPM | SYSTOLIC BLOOD PRESSURE: 100 MMHG

## 2021-03-02 DIAGNOSIS — N31.9 NEUROPATHIC BLADDER: Primary | ICD-10-CM

## 2021-03-02 PROCEDURE — 51703 INSERT BLADDER CATH COMPLEX: CPT | Performed by: UROLOGY

## 2021-03-02 NOTE — PROGRESS NOTES
Urology  Catheter change once every 3 weeks due to neurogenic bladder  Changed every 3 weeks due to encrustation  No issues with the last catheter    Procedure note  Complex catheter change due to altered anatomy from enlarged prostate  18 coudé catheter used  Mild encrustation noted  Bladder irrigated to clear  Follow-up 3 weeks with next cath change  Socorro Thrasher MD

## 2021-03-23 ENCOUNTER — PROCEDURE VISIT (OUTPATIENT)
Dept: UROLOGY | Age: 76
End: 2021-03-23
Payer: MEDICARE

## 2021-03-23 VITALS
BODY MASS INDEX: 25.05 KG/M2 | HEIGHT: 70 IN | SYSTOLIC BLOOD PRESSURE: 126 MMHG | DIASTOLIC BLOOD PRESSURE: 64 MMHG | WEIGHT: 175 LBS | HEART RATE: 67 BPM

## 2021-03-23 DIAGNOSIS — N31.9 NEUROPATHIC BLADDER: Primary | ICD-10-CM

## 2021-03-23 PROCEDURE — 51703 INSERT BLADDER CATH COMPLEX: CPT | Performed by: UROLOGY

## 2021-03-23 NOTE — PROGRESS NOTES
Urology  Neurogenic bladder with chronic indwelling Camacho changed every 3 weeks due to encrustation  Complex catheter change due to altered prostate anatomy    Procedure note  18 coudé catheter changed under sterile conditions  Bladder irrigated to clear  Follow-up 3 weeks as scheduled  I will tell the patient to discontinue tamsulosin  Edouard Coreas MD

## 2021-04-12 ENCOUNTER — PROCEDURE VISIT (OUTPATIENT)
Dept: UROLOGY | Age: 76
End: 2021-04-12
Payer: MEDICARE

## 2021-04-12 VITALS
HEART RATE: 65 BPM | BODY MASS INDEX: 25.05 KG/M2 | HEIGHT: 70 IN | SYSTOLIC BLOOD PRESSURE: 130 MMHG | WEIGHT: 175 LBS | DIASTOLIC BLOOD PRESSURE: 64 MMHG

## 2021-04-12 DIAGNOSIS — N31.9 NEUROPATHIC BLADDER: Primary | ICD-10-CM

## 2021-04-12 PROCEDURE — 51703 INSERT BLADDER CATH COMPLEX: CPT | Performed by: UROLOGY

## 2021-05-04 ENCOUNTER — PROCEDURE VISIT (OUTPATIENT)
Dept: UROLOGY | Age: 76
End: 2021-05-04
Payer: MEDICARE

## 2021-05-04 VITALS
WEIGHT: 175 LBS | HEART RATE: 69 BPM | SYSTOLIC BLOOD PRESSURE: 120 MMHG | HEIGHT: 70 IN | BODY MASS INDEX: 25.05 KG/M2 | DIASTOLIC BLOOD PRESSURE: 60 MMHG

## 2021-05-04 DIAGNOSIS — N31.9 NEUROPATHIC BLADDER: Primary | ICD-10-CM

## 2021-05-04 PROCEDURE — 51703 INSERT BLADDER CATH COMPLEX: CPT | Performed by: UROLOGY

## 2021-05-04 NOTE — PROGRESS NOTES
Urology  Neurogenic bladder with chronic indwelling Camacho changed monthly  Patient having more spasms recently with leakage around the catheter  Patient has been on finasteride for over 2 years  Patient will undergo cystoscopy next catheter change      Procedure note  Complex catheter change  18 Upper sorbian coudé catheter changed by urologist due to abnormal prostate anatomy  Mild encrustation noted  Patient will come back next month for cystoscopy catheter change to praise bladder function  Keya Arroyo MD

## 2021-05-25 ENCOUNTER — PROCEDURE VISIT (OUTPATIENT)
Dept: UROLOGY | Age: 76
End: 2021-05-25
Payer: MEDICARE

## 2021-05-25 VITALS
HEART RATE: 66 BPM | HEIGHT: 70 IN | DIASTOLIC BLOOD PRESSURE: 62 MMHG | WEIGHT: 175 LBS | SYSTOLIC BLOOD PRESSURE: 138 MMHG | BODY MASS INDEX: 25.05 KG/M2

## 2021-05-25 DIAGNOSIS — R33.9 URINARY RETENTION: Primary | ICD-10-CM

## 2021-05-25 PROCEDURE — 52000 CYSTOURETHROSCOPY: CPT | Performed by: UROLOGY

## 2021-05-25 PROCEDURE — 51725 SIMPLE CYSTOMETROGRAM: CPT | Performed by: UROLOGY

## 2021-06-09 ENCOUNTER — HOSPITAL ENCOUNTER (OUTPATIENT)
Age: 76
Setting detail: OUTPATIENT SURGERY
Discharge: HOME | End: 2021-06-09
Attending: UROLOGY | Admitting: UROLOGY
Payer: MEDICARE

## 2021-06-09 VITALS
SYSTOLIC BLOOD PRESSURE: 167 MMHG | WEIGHT: 175 LBS | BODY MASS INDEX: 25.05 KG/M2 | HEART RATE: 67 BPM | TEMPERATURE: 97.8 F | HEIGHT: 70 IN | OXYGEN SATURATION: 97 % | DIASTOLIC BLOOD PRESSURE: 87 MMHG

## 2021-06-09 LAB
GLUCOSE BLD-MCNC: 116 MG/DL (ref 60–115)
PERFORMED ON: ABNORMAL

## 2021-06-09 PROCEDURE — 3600000004 HC SURGERY LEVEL 4 BASE: Performed by: UROLOGY

## 2021-06-09 PROCEDURE — 2580000003 HC RX 258: Performed by: UROLOGY

## 2021-06-09 PROCEDURE — 2709999900 HC NON-CHARGEABLE SUPPLY: Performed by: UROLOGY

## 2021-06-09 PROCEDURE — 6370000000 HC RX 637 (ALT 250 FOR IP): Performed by: UROLOGY

## 2021-06-09 PROCEDURE — 76872 US TRANSRECTAL: CPT | Performed by: UROLOGY

## 2021-06-09 PROCEDURE — 3600000014 HC SURGERY LEVEL 4 ADDTL 15MIN: Performed by: UROLOGY

## 2021-06-09 PROCEDURE — 52000 CYSTOURETHROSCOPY: CPT | Performed by: UROLOGY

## 2021-06-09 RX ORDER — SULFAMETHOXAZOLE AND TRIMETHOPRIM 800; 160 MG/1; MG/1
1 TABLET ORAL
Status: COMPLETED | OUTPATIENT
Start: 2021-06-09 | End: 2021-06-09

## 2021-06-09 RX ORDER — LIDOCAINE HYDROCHLORIDE 20 MG/ML
JELLY TOPICAL PRN
Status: DISCONTINUED | OUTPATIENT
Start: 2021-06-09 | End: 2021-06-09 | Stop reason: ALTCHOICE

## 2021-06-09 RX ORDER — MAGNESIUM HYDROXIDE 1200 MG/15ML
LIQUID ORAL PRN
Status: DISCONTINUED | OUTPATIENT
Start: 2021-06-09 | End: 2021-06-09 | Stop reason: ALTCHOICE

## 2021-06-09 RX ORDER — WATER 1000 ML/1000ML
INJECTION, SOLUTION INTRAVENOUS PRN
Status: DISCONTINUED | OUTPATIENT
Start: 2021-06-09 | End: 2021-06-09 | Stop reason: ALTCHOICE

## 2021-06-09 RX ADMIN — SULFAMETHOXAZOLE AND TRIMETHOPRIM 1 TABLET: 800; 160 TABLET ORAL at 07:39

## 2021-06-09 ASSESSMENT — PULMONARY FUNCTION TESTS
PIF_VALUE: 0
PIF_VALUE: 0
PIF_VALUE: 1

## 2021-06-09 NOTE — OP NOTE
Operative Note      Patient: Clari Yarbrough  YOB: 1945  MRN: [id number]    Date of Procedure: 6/9/2021    Pre-Op Diagnosis: URINARY RETENTION    Post-Op Diagnosis: Same       Procedure(s):  RIGID CYSTOSCOPY  US OF PROSTATE    Surgeon(s):  Michelle Bravo MD    Assistant:   * No surgical staff found *    Anesthesia: Local    Estimated Blood Loss (mL): Minimal    Complications: None    Specimens:   * No specimens in log *    Implants:  * No implants in log *      Drains:   Urethral Catheter Coude 16 fr (Active)       Findings: Prostate 38 cc  Lateral lobe enlargement with bladder neck hypertrophy minimal median bar    Detailed Description of Procedure:   Procedure note  Patient has been in urinary retention for some time has been managed with chronic indwelling Camacho patient now is having some detrusor contractions and was evaluated with cystoscopy in the office he has minimal prostatic tissue secondary to long-term finasteride use patient will be evaluated today for possible UroLift procedure. Patient received preoperative p.o. antibiotic. Operative note:  Patient was brought into the operating room placed on table in the dorsolithotomy position. An indwelling Camacho was removed. Patient was prepped and draped in sterile fashion. 10 cc of lidocaine was injected into the urethra. Penile clamp was applied. Then an ultrasound of the prostate was performed. Prostate volume was calculated at 38 cc. No other abnormalities were noted. Next a 24 Lao cystoscope was used to perform a cystoscopy. Cystoscopic evaluation showed normal penile urethra. Prostatic urethra showed lateral lobe enlargement with mild lateral neck hypertrophy. Patient was also noted to have bladder spasms with high detrusor pressure. Examination of bladder showed no other abnormalities other than friable post prosthetic tissue. Patient is a candidate for UroLift procedure will be scheduled next week.   Patient tolerated procedure well there were no complications.     Electronically signed by Polly Hodgkin, MD on 6/9/2021 at 8:38 AM

## 2021-06-09 NOTE — BRIEF OP NOTE
Brief Postoperative Note      Patient: Clari Yarbrough  YOB: 1945  MRN: [id number]    Date of Procedure: 6/9/2021    Pre-Op Diagnosis: URINARY RETENTION    Post-Op Diagnosis: Same       Procedure(s):  RIGID CYSTOSCOPY  US OF PROSTATE    Surgeon(s):  Mihcelle Bravo MD    Assistant:  * No surgical staff found *    Anesthesia: Local    Estimated Blood Loss (mL): Minimal    Complications: None    Specimens:   * No specimens in log *    Implants:  * No implants in log *      Drains:   Urethral Catheter Coude 16 fr (Active)       Findings: 38 cc prostate  Lateral lobes/bladder neck hypertrophy    Electronically signed by Michelle Bravo MD on 6/9/2021 at 8:38 AM

## 2021-06-16 ENCOUNTER — HOSPITAL ENCOUNTER (OUTPATIENT)
Age: 76
Setting detail: OUTPATIENT SURGERY
Discharge: HOME | End: 2021-06-16
Attending: UROLOGY | Admitting: UROLOGY
Payer: MEDICARE

## 2021-06-16 ENCOUNTER — ANESTHESIA EVENT (OUTPATIENT)
Dept: OPERATING ROOM | Age: 76
End: 2021-06-16
Payer: MEDICARE

## 2021-06-16 ENCOUNTER — ANESTHESIA (OUTPATIENT)
Dept: OPERATING ROOM | Age: 76
End: 2021-06-16
Payer: MEDICARE

## 2021-06-16 VITALS — SYSTOLIC BLOOD PRESSURE: 102 MMHG | OXYGEN SATURATION: 100 % | DIASTOLIC BLOOD PRESSURE: 61 MMHG

## 2021-06-16 VITALS
DIASTOLIC BLOOD PRESSURE: 73 MMHG | OXYGEN SATURATION: 98 % | TEMPERATURE: 97.1 F | HEART RATE: 65 BPM | WEIGHT: 175 LBS | RESPIRATION RATE: 16 BRPM | BODY MASS INDEX: 25.05 KG/M2 | SYSTOLIC BLOOD PRESSURE: 126 MMHG | HEIGHT: 70 IN

## 2021-06-16 LAB
ANION GAP SERPL CALCULATED.3IONS-SCNC: 12 MEQ/L (ref 9–15)
BASOPHILS ABSOLUTE: 0.1 K/UL (ref 0–0.2)
BASOPHILS RELATIVE PERCENT: 1.1 %
BUN BLDV-MCNC: 14 MG/DL (ref 8–23)
CALCIUM SERPL-MCNC: 10 MG/DL (ref 8.5–9.9)
CHLORIDE BLD-SCNC: 101 MEQ/L (ref 95–107)
CO2: 24 MEQ/L (ref 20–31)
CREAT SERPL-MCNC: 1.09 MG/DL (ref 0.7–1.2)
EKG ATRIAL RATE: 61 BPM
EKG P AXIS: -22 DEGREES
EKG P-R INTERVAL: 178 MS
EKG Q-T INTERVAL: 454 MS
EKG QRS DURATION: 78 MS
EKG QTC CALCULATION (BAZETT): 457 MS
EKG R AXIS: 5 DEGREES
EKG T AXIS: 27 DEGREES
EKG VENTRICULAR RATE: 61 BPM
EOSINOPHILS ABSOLUTE: 0.1 K/UL (ref 0–0.7)
EOSINOPHILS RELATIVE PERCENT: 2.2 %
GFR AFRICAN AMERICAN: >60
GFR NON-AFRICAN AMERICAN: >60
GLUCOSE BLD-MCNC: 117 MG/DL (ref 60–115)
GLUCOSE BLD-MCNC: 118 MG/DL (ref 70–99)
HCT VFR BLD CALC: 39.2 % (ref 42–52)
HEMOGLOBIN: 13.2 G/DL (ref 14–18)
LYMPHOCYTES ABSOLUTE: 1 K/UL (ref 1–4.8)
LYMPHOCYTES RELATIVE PERCENT: 18.1 %
MCH RBC QN AUTO: 30.7 PG (ref 27–31.3)
MCHC RBC AUTO-ENTMCNC: 33.6 % (ref 33–37)
MCV RBC AUTO: 91.4 FL (ref 80–100)
MONOCYTES ABSOLUTE: 0.5 K/UL (ref 0.2–0.8)
MONOCYTES RELATIVE PERCENT: 8.5 %
NEUTROPHILS ABSOLUTE: 3.7 K/UL (ref 1.4–6.5)
NEUTROPHILS RELATIVE PERCENT: 70.1 %
PDW BLD-RTO: 15.9 % (ref 11.5–14.5)
PERFORMED ON: ABNORMAL
PLATELET # BLD: 175 K/UL (ref 130–400)
POTASSIUM SERPL-SCNC: 4 MEQ/L (ref 3.4–4.9)
RBC # BLD: 4.28 M/UL (ref 4.7–6.1)
SARS-COV-2, NAAT: NOT DETECTED
SODIUM BLD-SCNC: 137 MEQ/L (ref 135–144)
WBC # BLD: 5.3 K/UL (ref 4.8–10.8)

## 2021-06-16 PROCEDURE — 93005 ELECTROCARDIOGRAM TRACING: CPT | Performed by: ANESTHESIOLOGY

## 2021-06-16 PROCEDURE — 87635 SARS-COV-2 COVID-19 AMP PRB: CPT

## 2021-06-16 PROCEDURE — C1889 IMPLANT/INSERT DEVICE, NOC: HCPCS | Performed by: UROLOGY

## 2021-06-16 PROCEDURE — 6360000002 HC RX W HCPCS: Performed by: NURSE ANESTHETIST, CERTIFIED REGISTERED

## 2021-06-16 PROCEDURE — 80048 BASIC METABOLIC PNL TOTAL CA: CPT

## 2021-06-16 PROCEDURE — 52442 CYSTO INS TRNSPRSTC IMPLT EA: CPT | Performed by: UROLOGY

## 2021-06-16 PROCEDURE — 7100000001 HC PACU RECOVERY - ADDTL 15 MIN: Performed by: UROLOGY

## 2021-06-16 PROCEDURE — 52441 CYSTO INSJ TRNSPRSTC 1 IMPLT: CPT | Performed by: UROLOGY

## 2021-06-16 PROCEDURE — 7100000011 HC PHASE II RECOVERY - ADDTL 15 MIN: Performed by: UROLOGY

## 2021-06-16 PROCEDURE — 2709999900 HC NON-CHARGEABLE SUPPLY: Performed by: UROLOGY

## 2021-06-16 PROCEDURE — 3700000000 HC ANESTHESIA ATTENDED CARE: Performed by: UROLOGY

## 2021-06-16 PROCEDURE — 6360000002 HC RX W HCPCS: Performed by: UROLOGY

## 2021-06-16 PROCEDURE — 93010 ELECTROCARDIOGRAM REPORT: CPT | Performed by: INTERNAL MEDICINE

## 2021-06-16 PROCEDURE — 3700000001 HC ADD 15 MINUTES (ANESTHESIA): Performed by: UROLOGY

## 2021-06-16 PROCEDURE — 6370000000 HC RX 637 (ALT 250 FOR IP): Performed by: UROLOGY

## 2021-06-16 PROCEDURE — 2580000003 HC RX 258: Performed by: UROLOGY

## 2021-06-16 PROCEDURE — 2580000003 HC RX 258: Performed by: ANESTHESIOLOGY

## 2021-06-16 PROCEDURE — 3600000003 HC SURGERY LEVEL 3 BASE: Performed by: UROLOGY

## 2021-06-16 PROCEDURE — 2500000003 HC RX 250 WO HCPCS: Performed by: NURSE ANESTHETIST, CERTIFIED REGISTERED

## 2021-06-16 PROCEDURE — 85025 COMPLETE CBC W/AUTO DIFF WBC: CPT

## 2021-06-16 PROCEDURE — 3600000013 HC SURGERY LEVEL 3 ADDTL 15MIN: Performed by: UROLOGY

## 2021-06-16 PROCEDURE — 7100000000 HC PACU RECOVERY - FIRST 15 MIN: Performed by: UROLOGY

## 2021-06-16 PROCEDURE — 7100000010 HC PHASE II RECOVERY - FIRST 15 MIN: Performed by: UROLOGY

## 2021-06-16 DEVICE — SYSTEM URO W/ IMPL DEL DEV FOR TREAT OF URIN OUTFLO: Type: IMPLANTABLE DEVICE | Site: URETHRA | Status: FUNCTIONAL

## 2021-06-16 RX ORDER — MIDAZOLAM HYDROCHLORIDE 2 MG/2ML
INJECTION, SOLUTION INTRAMUSCULAR; INTRAVENOUS PRN
Status: DISCONTINUED | OUTPATIENT
Start: 2021-06-16 | End: 2021-06-16 | Stop reason: SDUPTHER

## 2021-06-16 RX ORDER — LABETALOL HYDROCHLORIDE 5 MG/ML
5 INJECTION, SOLUTION INTRAVENOUS EVERY 10 MIN PRN
Status: DISCONTINUED | OUTPATIENT
Start: 2021-06-16 | End: 2021-06-16 | Stop reason: HOSPADM

## 2021-06-16 RX ORDER — FENTANYL CITRATE 50 UG/ML
INJECTION, SOLUTION INTRAMUSCULAR; INTRAVENOUS PRN
Status: DISCONTINUED | OUTPATIENT
Start: 2021-06-16 | End: 2021-06-16 | Stop reason: SDUPTHER

## 2021-06-16 RX ORDER — LIDOCAINE HYDROCHLORIDE 20 MG/ML
INJECTION, SOLUTION INFILTRATION; PERINEURAL PRN
Status: DISCONTINUED | OUTPATIENT
Start: 2021-06-16 | End: 2021-06-16 | Stop reason: SDUPTHER

## 2021-06-16 RX ORDER — 0.9 % SODIUM CHLORIDE 0.9 %
500 INTRAVENOUS SOLUTION INTRAVENOUS
Status: DISCONTINUED | OUTPATIENT
Start: 2021-06-16 | End: 2021-06-16 | Stop reason: HOSPADM

## 2021-06-16 RX ORDER — METOCLOPRAMIDE HYDROCHLORIDE 5 MG/ML
10 INJECTION INTRAMUSCULAR; INTRAVENOUS
Status: DISCONTINUED | OUTPATIENT
Start: 2021-06-16 | End: 2021-06-16 | Stop reason: HOSPADM

## 2021-06-16 RX ORDER — ONDANSETRON 2 MG/ML
INJECTION INTRAMUSCULAR; INTRAVENOUS PRN
Status: DISCONTINUED | OUTPATIENT
Start: 2021-06-16 | End: 2021-06-16 | Stop reason: SDUPTHER

## 2021-06-16 RX ORDER — CEFAZOLIN SODIUM 2 G/50ML
2000 SOLUTION INTRAVENOUS
Status: COMPLETED | OUTPATIENT
Start: 2021-06-16 | End: 2021-06-16

## 2021-06-16 RX ORDER — ONDANSETRON 2 MG/ML
4 INJECTION INTRAMUSCULAR; INTRAVENOUS
Status: DISCONTINUED | OUTPATIENT
Start: 2021-06-16 | End: 2021-06-16 | Stop reason: HOSPADM

## 2021-06-16 RX ORDER — MAGNESIUM HYDROXIDE 1200 MG/15ML
LIQUID ORAL PRN
Status: DISCONTINUED | OUTPATIENT
Start: 2021-06-16 | End: 2021-06-16 | Stop reason: ALTCHOICE

## 2021-06-16 RX ORDER — SODIUM CHLORIDE, SODIUM LACTATE, POTASSIUM CHLORIDE, CALCIUM CHLORIDE 600; 310; 30; 20 MG/100ML; MG/100ML; MG/100ML; MG/100ML
INJECTION, SOLUTION INTRAVENOUS CONTINUOUS
Status: DISCONTINUED | OUTPATIENT
Start: 2021-06-16 | End: 2021-06-16 | Stop reason: HOSPADM

## 2021-06-16 RX ORDER — DIPHENHYDRAMINE HYDROCHLORIDE 50 MG/ML
12.5 INJECTION INTRAMUSCULAR; INTRAVENOUS
Status: DISCONTINUED | OUTPATIENT
Start: 2021-06-16 | End: 2021-06-16 | Stop reason: HOSPADM

## 2021-06-16 RX ORDER — LIDOCAINE HYDROCHLORIDE 20 MG/ML
JELLY TOPICAL PRN
Status: DISCONTINUED | OUTPATIENT
Start: 2021-06-16 | End: 2021-06-16 | Stop reason: ALTCHOICE

## 2021-06-16 RX ORDER — PROPOFOL 10 MG/ML
INJECTION, EMULSION INTRAVENOUS PRN
Status: DISCONTINUED | OUTPATIENT
Start: 2021-06-16 | End: 2021-06-16 | Stop reason: SDUPTHER

## 2021-06-16 RX ORDER — FENTANYL CITRATE 50 UG/ML
25 INJECTION, SOLUTION INTRAMUSCULAR; INTRAVENOUS EVERY 5 MIN PRN
Status: DISCONTINUED | OUTPATIENT
Start: 2021-06-16 | End: 2021-06-16 | Stop reason: HOSPADM

## 2021-06-16 RX ORDER — HYDROCODONE BITARTRATE AND ACETAMINOPHEN 5; 325 MG/1; MG/1
2 TABLET ORAL PRN
Status: DISCONTINUED | OUTPATIENT
Start: 2021-06-16 | End: 2021-06-16 | Stop reason: HOSPADM

## 2021-06-16 RX ORDER — MEPERIDINE HYDROCHLORIDE 25 MG/ML
12.5 INJECTION INTRAMUSCULAR; INTRAVENOUS; SUBCUTANEOUS EVERY 5 MIN PRN
Status: DISCONTINUED | OUTPATIENT
Start: 2021-06-16 | End: 2021-06-16 | Stop reason: HOSPADM

## 2021-06-16 RX ORDER — HYDROCODONE BITARTRATE AND ACETAMINOPHEN 5; 325 MG/1; MG/1
1 TABLET ORAL PRN
Status: DISCONTINUED | OUTPATIENT
Start: 2021-06-16 | End: 2021-06-16 | Stop reason: HOSPADM

## 2021-06-16 RX ORDER — SULFAMETHOXAZOLE AND TRIMETHOPRIM 800; 160 MG/1; MG/1
1 TABLET ORAL 2 TIMES DAILY
Qty: 14 TABLET | Refills: 0 | Status: SHIPPED | OUTPATIENT
Start: 2021-06-16 | End: 2021-06-23

## 2021-06-16 RX ADMIN — FENTANYL CITRATE 50 MCG: 50 INJECTION, SOLUTION INTRAMUSCULAR; INTRAVENOUS at 11:37

## 2021-06-16 RX ADMIN — FENTANYL CITRATE 25 MCG: 50 INJECTION, SOLUTION INTRAMUSCULAR; INTRAVENOUS at 12:00

## 2021-06-16 RX ADMIN — LIDOCAINE HYDROCHLORIDE 60 MG: 20 INJECTION, SOLUTION INFILTRATION; PERINEURAL at 11:37

## 2021-06-16 RX ADMIN — FENTANYL CITRATE 25 MCG: 50 INJECTION, SOLUTION INTRAMUSCULAR; INTRAVENOUS at 11:51

## 2021-06-16 RX ADMIN — PROPOFOL 150 MG: 10 INJECTION, EMULSION INTRAVENOUS at 11:37

## 2021-06-16 RX ADMIN — GENTAMICIN SULFATE 120 MG: 40 INJECTION, SOLUTION INTRAMUSCULAR; INTRAVENOUS at 11:44

## 2021-06-16 RX ADMIN — ONDANSETRON 4 MG: 2 INJECTION INTRAMUSCULAR; INTRAVENOUS at 11:40

## 2021-06-16 RX ADMIN — CEFAZOLIN SODIUM 2000 MG: 2 SOLUTION INTRAVENOUS at 11:40

## 2021-06-16 RX ADMIN — MIDAZOLAM HYDROCHLORIDE 1 MG: 1 INJECTION, SOLUTION INTRAMUSCULAR; INTRAVENOUS at 11:31

## 2021-06-16 RX ADMIN — MIDAZOLAM HYDROCHLORIDE 1 MG: 1 INJECTION, SOLUTION INTRAMUSCULAR; INTRAVENOUS at 11:37

## 2021-06-16 RX ADMIN — SODIUM CHLORIDE, POTASSIUM CHLORIDE, SODIUM LACTATE AND CALCIUM CHLORIDE: 600; 310; 30; 20 INJECTION, SOLUTION INTRAVENOUS at 10:55

## 2021-06-16 ASSESSMENT — PULMONARY FUNCTION TESTS
PIF_VALUE: 0
PIF_VALUE: 13
PIF_VALUE: 1
PIF_VALUE: 3
PIF_VALUE: 4
PIF_VALUE: 15
PIF_VALUE: 4
PIF_VALUE: 2
PIF_VALUE: 15
PIF_VALUE: 10
PIF_VALUE: 1
PIF_VALUE: 15
PIF_VALUE: 10
PIF_VALUE: 15
PIF_VALUE: 0
PIF_VALUE: 16
PIF_VALUE: 3
PIF_VALUE: 15
PIF_VALUE: 1
PIF_VALUE: 15
PIF_VALUE: 1
PIF_VALUE: 15
PIF_VALUE: 2

## 2021-06-16 NOTE — PROGRESS NOTES
Opening eyes, oral airway removed, O2 maintianed at 8L mask, SAO2 100%, Pt denies pain, nause, or dyspnea.

## 2021-06-16 NOTE — OP NOTE
Operative Note      Patient: Jacinda Melo  YOB: 1945  MRN: [id number]    Date of Procedure: 6/16/2021    Pre-Op Diagnosis: URINARY RETENTION    Post-Op Diagnosis: Same       Procedure(s):  UROLIFT CYSTOSCOPY    Surgeon(s):  Francis Wilson MD    Assistant:   * No surgical staff found *    Anesthesia: General    Estimated Blood Loss (mL): Minimal    Complications: None    Specimens:   * No specimens in log *    Implants:  Implant Name Type Inv. Item Serial No.  Lot No. LRB No. Used Action   SYSTEM URO W/ IMPL DEL DEV FOR TREAT OF URIN OUTFLO  SYSTEM URO W/ IMPL DEL DEV FOR TREAT OF URIN OUTFLO  [employer] [id number] N/A 2 Implanted         Drains:   Urethral Catheter Latex 22 fr (Active)       [REMOVED] Urethral Catheter Coude 16 fr (Removed)       Findings: Excellent defect after placing to UroLift implants/sutures    Detailed Description of Procedure: Indications  Patient is a 69-year-old male with history of neuropathic bladder etiology unknown initially thought to be secondary to bladder outlet obstruction from enlarged prostate. Patient has been on finasteride and tamsulosin for some time. Patient recently has been having significant amount of bladder spasms with urine shooting  through the sides of the catheter indicating good detrusor pressure/bladder spasms. Patient underwent cystoscopy which showed obstructing distal prostatic tissue with wide open bladder neck and a small prostate approximately 20 g in size. Patient will undergo UroLift procedure to potentially relieve the obstruction and allow the removal of the chronic indwelling Camacho. Patient understands risks of benefits including not being able to have the catheter removed and incontinence after surgery if the catheter is removed which may have him opt to be managed with a catheter again.   Operative note  Patient was brought into the operating room placed operative table in a dorsal time position after initiation general anesthetic. Patient received both Ancef and gentamicin IV. Camacho catheter was removed and the patient was irrigated out with copious amounts of saline. Some of the material in the bladder from chronic stasis were irrigated out. Then a continuous flow cystoscope used for UroLift procedure was performed to do a cystoscopy. No new findings were noted on the cystoscopic evaluation. Patient had a wide open bladder neck and significant amount of residual obstructing prostatic tissue at the level of the verumontanum. 2 UroLift implants/sutures were placed at the level just proximal to the verumontanum with excellent defect noted. There was minimal bleeding after the surgery. Camacho catheter was placed. Patient was discharged home on antibiotics. Patient tolerated procedure well. There were no complications.   Elmo Charles MD    Electronically signed by Freddy Delgadillo MD on 6/16/2021 at 1:14 PM

## 2021-06-16 NOTE — PROGRESS NOTES
Received fro or into pacu on a cart accompanied in by Breann Gutierrez crna. Oral airway in place with )2 on at 8L mask, breath sounds clear to anterior auscultation. MP RSR.  Camacho catheter to leg bag noted, no urine noted within bag at present, small amount red drainage noted on dressing noted at on penis at catheter insertion site

## 2021-06-16 NOTE — BRIEF OP NOTE
Brief Postoperative Note      Patient: Rosemary Alonso  YOB: 1945  MRN: [id number]    Date of Procedure: 6/16/2021    Pre-Op Diagnosis: URINARY RETENTION    Post-Op Diagnosis: Same       Procedure(s):  UROLIFT CYSTOSCOPY    Surgeon(s):  Ny Phillips MD    Assistant:  * No surgical staff found *    Anesthesia: General    Estimated Blood Loss (mL): Minimal    Complications: None    Specimens:   * No specimens in log *    Implants:  Implant Name Type Inv.  Item Serial No.  Lot No. LRB No. Used Action   SYSTEM URO W/ IMPL DEL DEV FOR TREAT OF URIN OUTFLO  SYSTEM URO W/ IMPL DEL DEV FOR TREAT OF URIN OUTFLO  [employer] [id number] N/A 2 Implanted         Drains:   Urethral Catheter Latex 22 fr (Active)       [REMOVED] Urethral Catheter Coude 16 fr (Removed)       Findings: 2 implants    Electronically signed by Ny Phillips MD on 6/16/2021 at 12:28 PM

## 2021-06-16 NOTE — H&P
[facility] EVALUATION NOTE                                                 H&P                                                                                                                                                 Reason for Visit  Urinary retention    History of Present Illness  History of detrusor instability urinary retention  Patient was evaluated for UroLift procedure  Patient will undergo UroLift procedure today      Urologic Review of Systems/Symptoms  Urinary retention    Review of Systems  Hospitalization: None recent  All 14 categories of Review of Systems otherwise reviewed no other findings reported.   Meds reviewed  Past Medical History:   Diagnosis Date    Cancer ([address]     liver    Gout     Hyperlipidemia     Hypertension     Hypothyroidism due to acquired atrophy of thyroid 3/22/2018    Type II or unspecified type diabetes mellitus without mention of complication, not stated as uncontrolled      Past Surgical History:   Procedure Laterality Date    COLONOSCOPY      CYSTOSCOPY N/A 6/9/2021    RIGID CYSTOSCOPY performed by Jennie Alex MD at [facility]. [address], COLON, DIAGNOSTIC      OTHER SURGICAL HISTORY Left 07/16/2019    cheek - a section of skin was frozen off     [place] COLON CA SCRN NOT HI RSK IND N/A 11/21/2017    [facility] ESOPHAGOGASTRODUODENOSCOPY TRANSORAL DIAGNOSTIC N/A 3/21/2017    EGD ESOPHAGOGASTRODUODENOSCOPY performed by Samantha Smyth MD at [facility] N/A 6/9/2021    [address] performed by Jennie Alex MD at [address] [facility] N/A 12/6/2016    EGD BIOPSY, Banding performed by Samantha Smyth MD at [address] Marital status:      Spouse name: Not on file    Number of children: Not on file    Years of education: Not on file    Highest education level: Not on file   Occupational History    Not on file   Tobacco Use    Smoking status: Former Smoker     Packs/day: 1.00     Years: 13.00     Pack years: 13.00     Start date:      Quit date: 1975     Years since quittin.2    Smokeless tobacco: Never Used   Substance and Sexual Activity    Alcohol use: No    Drug use: No    Sexual activity: Never   Other Topics Concern    Not on file   Social History Narrative    Not on file     Social Determinants of Health     Financial Resource Strain:     Difficulty of Paying Living Expenses:    Food Insecurity:     Worried About Running Out of Food in the Last Year:     [address] in the Last Year:    Transportation Needs:     Lack of Transportation (Medical):  Lack of Transportation (Non-Medical):    Physical Activity:     Days of Exercise per Week:     Minutes of Exercise per Session:    Stress:     Feeling of Stress :    Social Connections:     Frequency of Communication with Friends and Family:     Frequency of Social Gatherings with Friends and Family:     Attends Sikh Services:     Active Member of Clubs or Organizations:     Attends Club or Organization Meetings:     Marital Status:    Intimate Partner Violence:     Fear of Current or Ex-Partner:     Emotionally Abused:     Physically Abused:     Sexually Abused:      No family history on file.   Current Facility-Administered Medications   Medication Dose Route Frequency Provider Last Rate Last Admin    ceFAZolin (ANCEF) 2000 mg in dextrose 3 % 50 mL IVPB (duplex)  2,000 mg Intravenous On Call to Sandra Ramirez MD        gentamicin (GARAMYCIN) 120 mg in dextrose 5 % 100 mL IVPB  120 mg Intravenous On Call to Sandra Ramirez MD        lactated ringers infusion   Intravenous Continuous Yelitza Moreland  mL/hr at 21 1055 New Bag at 21 1055    meperidine (DEMEROL) injection 12.5 mg  12.5 mg Intravenous Q5 Min PRN Yelitza Moreland MD        fentaNYL (SUBLIMAZE) injection 25 mcg  25 mcg Intravenous Q5 Min PRBRYANT Joseph MD        HYDROmorphone (DILAUDID) injection 0.5 mg  0.5 mg Intravenous Q5 Min PRBRYANT Joseph MD        HYDROcodone-acetaminophen [facility]) 5-325 MG per tablet 1 tablet  1 tablet Oral LYDIA Joseph MD        Or    HYDROcodone-acetaminophen [facility]) 5-325 MG per tablet 2 tablet  2 tablet Oral LYDIA Joseph MD        ondansetron [facility]) injection 4 mg  4 mg Intravenous Once PRBRYANT Joseph MD        0.9 % sodium chloride bolus  500 mL Intravenous Once PRBRYANT Joseph MD        diphenhydrAMINE (BENADRYL) injection 12.5 mg  12.5 mg Intravenous Once PRBRYANT Joseph MD        labetalol (NORMODYNE;TRANDATE) injection 5 mg  5 mg Intravenous Q10 Min PRBRYANT Joseph MD        metoclopramide (REGLAN) injection 10 mg  10 mg Intravenous Once PRBRYANT Joseph MD         Lisinopril  All reviewed and verified by Dr Vidales Forward on today's visit    PSA   Date Value Ref Range Status   02/15/2016 4.4 ng/mL Final     [unfilled]    Physical Exam  Vitals:    06/16/21 1036   BP: (!) 160/83   Pulse: 60   Resp: 16   Temp: 97.7 °F (36.5 °C)   TempSrc: Temporal   SpO2: 98%   Weight: 175 lb (79.4 kg)   Height: 5' 10\" (1.778 m)     Constitutional: Not in distress. Cardiovascular: Normal rate, BP reviewed. Normal  Pulmonary/Chest: Normal respiratory effort not short of breath  Abdominal: Not distended. No hernias  Urologic Exam  Unchanged  Chronic indwelling Camacho. Assessment/Medical Necessity-Decision Making  Urinary retention with detrusor instability  Plan  UroLift procedure  All risks and benefits explained in detail prior to admission  Greater than 50% of 30 minutes spent consulting patient face-to-face  Orders Placed This Encounter   Procedures    COVID-19, Rapid     Standing Status:   Standing     Number of Occurrences:   1     Order Specific Question:   Is this test for diagnosis or screening?      Answer: Screening     Order Specific Question:   Symptomatic for COVID-19 as defined by CDC? Answer:   No     Order Specific Question:   Reason for Test     Answer:   Upcoming elective surgery/procedure/delivery, return to work, or discharge to another facility     Order Specific Question:   Date of Symptom Onset     Answer:   N/A     Order Specific Question:   Hospitalized for COVID-19? Answer:   No     Order Specific Question:   Admitted to ICU for COVID-19? Answer:   No     Order Specific Question:   Employed in healthcare setting? Answer:   Unknown     Order Specific Question:   Resident in a congregate (group) care setting? Answer:   Unknown     Order Specific Question:   Pregnant: Answer:   No     Order Specific Question:   Previously tested for COVID-19? Answer:   Unknown    CBC Auto Differential     Standing Status:   Standing     Number of Occurrences:   1    Basic Metabolic Panel     Standing Status:   Standing     Number of Occurrences:   1    Vital signs per unit routine     Standing Status:   Standing     Number of Occurrences:   1    Notify anesthesia provider     Notify anesthesia provider for pulse less than 60 or greater than 120, respiratory rate less than 8 or greater than 25, temperature greater than 101.3 F (38.5 C) , urinary output less than 30 ml/hr, systolic BP less than 90 or greater than 834, diastolic BP less than 60 or greater than 90, pulse oximetry less than 92%. Standing Status:   Standing     Number of Occurrences:   1    Phase I - bedrest     Patient must remain on bedrest until motor and sensory function is back to baseline. Must be assisted the first time the patient is ambulated. Standing Status:   Standing     Number of Occurrences:   1    Phase I - warming device     May be applied for temperatue less than 35C (95 F), or if patient is symptomatic.  Core body temp equivalents (patients are hypothermic if temperture equal to or less than these readings): 1) Oral temperature equal to  35.8C (96.4F). (Temporal temperture equivalent to oral values if temporal thermometer is labeled Arterial/Oral). 2) Bladder temperature equal to 36.3C (97.3F). 4) Axillary temperature equal to  34.5C (94.1F). 5) Rectal temperature     Standing Status:   Standing     Number of Occurrences:   62282    Phase I - cardiac monitor     Standing Status:   Standing     Number of Occurrences:   69935    Phase I & II - IV infusion rate     1) Run present IV at 100 mL/hour, unless otherwise ordered by surgeon. Standing Status:   Standing     Number of Occurrences:   1    Nursing communication - Discharge from PACU     May discharge patient from PACU when criteria met. Standing Status:   Standing     Number of Occurrences:   1    Encourage deep breathing and coughing     Standing Status:   Standing     Number of Occurrences:   1    Continuous Pulse Oximetry     Document with vital signs and PRN     Standing Status:   Standing     Number of Occurrences:   1    Initiate Oxygen Therapy Protocol     Initiate oxygen therapy if the patient has 1) SpO2 is less than 90%, 2) Cyanosis, Chest Pain, Dyspnea, or Altered level of consciousness AND SpO2 checked and it is less than 90%, or 3) patient on Home oxygen. To initiate oxygen therapy: nurse enters RT51 Nasal cannula oxygen order using Per Protocol order mode (if indication Home Oxygen then change L/min to same amount at home and change Wean to Room Air to No). Notify provider if initiate oxygen therapy unless already on Home Oxygen. Standing Status:   Standing     Number of Occurrences:   4    POCT Glucose     Standing Status:   Standing     Number of Occurrences:   1    Phase I & II - metered glucose     Check glucose level if patient on medication for glucose control.      Standing Status:   Standing     Number of Occurrences:   77227    EKG 12 Lead     Standing Status:   Standing     Number of Occurrences:   1 Order Specific Question:   Reason for Exam?     Answer:   Pre-op     Orders Placed This Encounter   Medications    ceFAZolin (ANCEF) 2000 mg in dextrose 3 % 50 mL IVPB (duplex)     Order Specific Question:   Antimicrobial Indications     Answer:   Surgical Prophylaxis    gentamicin (GARAMYCIN) 120 mg in dextrose 5 % 100 mL IVPB     Order Specific Question:   Antimicrobial Indications     Answer:   Surgical Prophylaxis    lactated ringers infusion    meperidine (DEMEROL) injection 12.5 mg    fentaNYL (SUBLIMAZE) injection 25 mcg    HYDROmorphone (DILAUDID) injection 0.5 mg    OR Linked Order Group     HYDROcodone-acetaminophen (NORCO) 5-325 MG per tablet 1 tablet     HYDROcodone-acetaminophen (NORCO) 5-325 MG per tablet 2 tablet    ondansetron (ZOFRAN) injection 4 mg    0.9 % sodium chloride bolus    diphenhydrAMINE (BENADRYL) injection 12.5 mg    labetalol (NORMODYNE;TRANDATE) injection 5 mg    metoclopramide (REGLAN) injection 10 mg     No name on file. Please note this report has been partially produced using speech recognition software  And may cause contain errors related to that system including grammar, punctuation and spelling as well as words and phrases that may seem inappropriate. If there are questions or concerns please feel free to contact me to clarify.

## 2021-06-16 NOTE — ANESTHESIA PRE PROCEDURE
Department of Anesthesiology  Preprocedure Note       Name:  Beck Zurita   Age:  67 y.o.  :  1945                                          MRN:  [id number]         Date:  2021      Surgeon: Ml Burkett):  Soy Hidalgo MD    Procedure: Procedure(s):  UROLIFT CYSTOSCOPY (PAT ON ADMIT)  DR. BARRETO TO COVER H&P    Medications prior to admission:   Prior to Admission medications    Medication Sig Start Date End Date Taking? Authorizing Provider   spironolactone (ALDACTONE) 50 MG tablet Take 1 tablet by mouth daily 2/15/21  Yes Sapphire Kennedy MD   finasteride (PROSCAR) 5 MG tablet Take 1 tablet by mouth daily 10/19/20  Yes Soy Hidalgo MD   metFORMIN (GLUCOPHAGE-XR) 500 MG extended release tablet Take 1 tablet by mouth daily (with breakfast) 1/15/19  Yes Candice Tsai DO   levothyroxine (SYNTHROID) 50 MCG tablet Take 1 tablet by mouth Daily 12/10/18  Yes Candice Tsai DO   allopurinol (ZYLOPRIM) 300 MG tablet Take 1 tablet by mouth daily 18  Yes Candice Tsai DO   Cholecalciferol (VITAMIN D) 2000 units TABS tablet Take 1 tablet by mouth daily 3/22/18  Yes Candice Tsai DO   nadolol (CORGARD) 20 MG tablet Take 20 mg by mouth daily   Yes Historical Provider, MD   furosemide (LASIX) 40 MG tablet Take 40 mg by mouth 2 times daily   Yes Historical Provider, MD   spironolactone (ALDACTONE) 25 MG tablet  3/17/20   Historical Provider, MD       Current medications:    Current Facility-Administered Medications   Medication Dose Route Frequency Provider Last Rate Last Admin    ceFAZolin (ANCEF) 2000 mg in dextrose 3 % 50 mL IVPB (duplex)  2,000 mg Intravenous On Call to Sandra Ramirez MD        gentamicin (GARAMYCIN) 120 mg in dextrose 5 % 100 mL IVPB  120 mg Intravenous On Call to Sandra Ramirez MD        lactated ringers infusion   Intravenous Continuous Catarino Chan  mL/hr at 21 1055 New Bag at 21 1055       Allergies:     Allergies   Allergen Reactions    Lisinopril Other (See Comments)     hyperkalemia       Problem List:    Patient Active Problem List   Diagnosis Code    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    BPH with urinary obstruction N40.1, N13.8    Hyperuricemia E79.0    Type 2 diabetes mellitus with microalbuminuria, without long-term current use of insulin (HCC) E11.29, R80.9    History of ascites Z87.898    Chronic indwelling Camacho catheter Z97.8    Essential hypertension I10    Polyp of colon K63.5    Vitamin D deficiency E55.9    Hypothyroidism due to acquired atrophy of thyroid E03.4       Past Medical History:        Diagnosis Date    Cancer ([facility] [address]     liver    Gout     Hyperlipidemia     Hypertension     Hypothyroidism due to acquired atrophy of thyroid 3/22/2018    Type II or unspecified type diabetes mellitus without mention of complication, not stated as uncontrolled        Past Surgical History:        Procedure Laterality Date    COLONOSCOPY      CYSTOSCOPY N/A 2021    RIGID CYSTOSCOPY performed by Natasha Acosta MD at [facility]. [address], COLON, DIAGNOSTIC      OTHER SURGICAL HISTORY Left 2019    cheek - a section of skin was frozen off     [place] COLON CA SCRN NOT HI RSK IND N/A 2017    COLONOSCOPY performed by Roseanne Prakash MD at . [address] ESOPHAGOGASTRODUODENOSCOPY TRANSORAL DIAGNOSTIC N/A 3/21/2017    EGD ESOPHAGOGASTRODUODENOSCOPY performed by Anthony Holcomb MD at [facility] 2021    [facility] performed by Natasha Acosta MD at [address] [place] [address] ENDOSCOPY N/A 2016    EGD BIOPSY, Banding performed by Anthony Holcomb MD at [facility] History:    Social History     Tobacco Use    Smoking status: Former Smoker     Packs/day: 1.00     Years: 13.00     Pack years: 13.00     Start date:      Quit date: 1975     Years since quittin.1    Smokeless tobacco: Never Used   Substance Use Topics    Alcohol use: No                                Counseling given: Not Answered      Vital Signs (Current):   Vitals:    06/16/21 1036   BP: (!) 160/83   Pulse: 60   Resp: 16   Temp: 97.7 °F (36.5 °C)   TempSrc: Temporal   SpO2: 98%   Weight: 175 lb (79.4 kg)   Height: 5' 10\" (1.778 m)                                              BP Readings from Last 3 Encounters:   06/16/21 (!) 160/83   06/09/21 (!) 167/87   05/25/21 138/62       NPO Status: Time of last liquid consumption: 1900                        Time of last solid consumption: 1600                        Date of last liquid consumption: 06/15/21                        Date of last solid food consumption: 06/15/21    BMI:   Wt Readings from Last 3 Encounters:   06/16/21 175 lb (79.4 kg)   06/09/21 175 lb (79.4 kg)   05/25/21 175 lb (79.4 kg)     Body mass index is 25.11 kg/m².     CBC:   Lab Results   Component Value Date    WBC 5.3 06/16/2021    RBC 4.28 06/16/2021    HGB 13.2 06/16/2021    HCT 39.2 06/16/2021    MCV 91.4 06/16/2021    RDW 15.9 06/16/2021     06/16/2021       CMP:   Lab Results   Component Value Date     06/16/2021    K 4.0 06/16/2021     06/16/2021    CO2 24 06/16/2021    BUN 14 06/16/2021    CREATININE 1.09 06/16/2021    GFRAA >60.0 06/16/2021    LABGLOM >60.0 06/16/2021    GLUCOSE 118 06/16/2021    PROT 8.1 12/17/2018    CALCIUM 10.0 06/16/2021    BILITOT 0.8 12/17/2018    ALKPHOS 109 12/17/2018    AST 23 12/17/2018    ALT 16 12/17/2018       POC Tests:   Recent Labs     06/16/21  1046   POCGLU 117*       Coags: No results found for: PROTIME, INR, APTT    HCG (If Applicable): No results found for: PREGTESTUR, PREGSERUM, HCG, HCGQUANT     ABGs: No results found for: PHART, PO2ART, AVO9WLP, XJI1PNW, BEART, Q2OJYHSG     Type & Screen (If Applicable):  No results found for: LABABO, LABRH    Drug/Infectious Status (If Applicable):  No results found for: HIV, HEPCAB    COVID-19 Screening (If Applicable):   Lab Results

## 2021-06-29 ENCOUNTER — OFFICE VISIT (OUTPATIENT)
Dept: UROLOGY | Age: 76
End: 2021-06-29

## 2021-06-29 VITALS
BODY MASS INDEX: 25.05 KG/M2 | WEIGHT: 175 LBS | HEIGHT: 70 IN | HEART RATE: 71 BPM | SYSTOLIC BLOOD PRESSURE: 98 MMHG | DIASTOLIC BLOOD PRESSURE: 62 MMHG

## 2021-06-29 DIAGNOSIS — N31.9 NEUROPATHIC BLADDER: Primary | ICD-10-CM

## 2021-06-29 PROCEDURE — 99024 POSTOP FOLLOW-UP VISIT: CPT | Performed by: UROLOGY

## 2021-06-29 RX ORDER — CEPHALEXIN 500 MG/1
500 CAPSULE ORAL EVERY 12 HOURS
Qty: 20 CAPSULE | Refills: 0 | Status: SHIPPED | OUTPATIENT
Start: 2021-06-29 | End: 2021-10-01 | Stop reason: ALTCHOICE

## 2021-06-29 NOTE — PROGRESS NOTES
Urology  Patient here for trial of void  Bladder filled with 200 cc  Patient voided 200 cc  Start Keflex  Follow-up in 48 hours for residual and UA  Karly Treviño MD

## 2021-07-01 ENCOUNTER — OFFICE VISIT (OUTPATIENT)
Dept: UROLOGY | Age: 76
End: 2021-07-01
Payer: MEDICARE

## 2021-07-01 VITALS
DIASTOLIC BLOOD PRESSURE: 74 MMHG | HEIGHT: 70 IN | HEART RATE: 64 BPM | SYSTOLIC BLOOD PRESSURE: 126 MMHG | BODY MASS INDEX: 25.05 KG/M2 | WEIGHT: 175 LBS

## 2021-07-01 DIAGNOSIS — R33.9 RETENTION OF URINE: ICD-10-CM

## 2021-07-01 DIAGNOSIS — R33.9 RETENTION OF URINE: Primary | ICD-10-CM

## 2021-07-01 LAB
BILIRUBIN, POC: ABNORMAL
BLOOD URINE, POC: ABNORMAL
CLARITY, POC: CLEAR
COLOR, POC: YELLOW
GLUCOSE URINE, POC: ABNORMAL
KETONES, POC: ABNORMAL
LEUKOCYTE EST, POC: ABNORMAL
NITRITE, POC: ABNORMAL
PH, POC: 5.5
POST VOID RESIDUAL (PVR): 21 ML
PROTEIN, POC: ABNORMAL
SPECIFIC GRAVITY, POC: 1.02
UROBILINOGEN, POC: 0.2

## 2021-07-01 PROCEDURE — 81003 URINALYSIS AUTO W/O SCOPE: CPT | Performed by: UROLOGY

## 2021-07-01 PROCEDURE — 99024 POSTOP FOLLOW-UP VISIT: CPT | Performed by: UROLOGY

## 2021-07-01 PROCEDURE — 51798 US URINE CAPACITY MEASURE: CPT | Performed by: UROLOGY

## 2021-07-01 NOTE — PROGRESS NOTES
Urology  Status post UroLift  Patient voiding without difficulty  Urinalysis clear  Postvoid residual 21 cc  Improving control  Follow-up 3 months  Postop visit  Anant Garcia MD

## 2021-07-04 LAB
ORGANISM: ABNORMAL
URINE CULTURE, ROUTINE: ABNORMAL

## 2021-07-04 RX ORDER — CIPROFLOXACIN 500 MG/1
500 TABLET, FILM COATED ORAL 2 TIMES DAILY
Qty: 20 TABLET | Refills: 0 | Status: SHIPPED | OUTPATIENT
Start: 2021-07-04 | End: 2021-10-01 | Stop reason: ALTCHOICE

## 2021-09-27 ENCOUNTER — TELEPHONE (OUTPATIENT)
Dept: GASTROENTEROLOGY | Age: 76
End: 2021-09-27

## 2021-09-28 RX ORDER — SPIRONOLACTONE 50 MG/1
50 TABLET, FILM COATED ORAL DAILY
Qty: 60 TABLET | Refills: 3 | Status: SHIPPED | OUTPATIENT
Start: 2021-09-28

## 2021-10-01 ENCOUNTER — OFFICE VISIT (OUTPATIENT)
Dept: UROLOGY | Age: 76
End: 2021-10-01
Payer: MEDICARE

## 2021-10-01 VITALS
DIASTOLIC BLOOD PRESSURE: 64 MMHG | HEIGHT: 70 IN | WEIGHT: 175 LBS | HEART RATE: 60 BPM | SYSTOLIC BLOOD PRESSURE: 130 MMHG | BODY MASS INDEX: 25.05 KG/M2 | OXYGEN SATURATION: 96 %

## 2021-10-01 DIAGNOSIS — R33.9 RETENTION OF URINE: Primary | ICD-10-CM

## 2021-10-01 DIAGNOSIS — R33.9 URINARY RETENTION: ICD-10-CM

## 2021-10-01 LAB
BILIRUBIN, POC: ABNORMAL
BLOOD URINE, POC: ABNORMAL
CLARITY, POC: CLEAR
COLOR, POC: YELLOW
GLUCOSE URINE, POC: ABNORMAL
KETONES, POC: ABNORMAL
LEUKOCYTE EST, POC: ABNORMAL
NITRITE, POC: POSITIVE
PH, POC: 6
POST VOID RESIDUAL (PVR): 79 ML
PROTEIN, POC: ABNORMAL
SPECIFIC GRAVITY, POC: >=1.03
UROBILINOGEN, POC: 0.2

## 2021-10-01 PROCEDURE — 99214 OFFICE O/P EST MOD 30 MIN: CPT | Performed by: UROLOGY

## 2021-10-01 PROCEDURE — 51798 US URINE CAPACITY MEASURE: CPT | Performed by: UROLOGY

## 2021-10-01 PROCEDURE — 81003 URINALYSIS AUTO W/O SCOPE: CPT | Performed by: UROLOGY

## 2021-10-01 RX ORDER — CIPROFLOXACIN 500 MG/1
500 TABLET, FILM COATED ORAL 2 TIMES DAILY
Qty: 14 TABLET | Refills: 0 | Status: SHIPPED | OUTPATIENT
Start: 2021-10-01 | End: 2022-01-03 | Stop reason: ALTCHOICE

## 2021-10-01 NOTE — PROGRESS NOTES
[facility] EVALUATION NOTE                                                 H&P                                                                                                                                                 Reason for Visit  BPH with obstruction    History of Present Illness  Status post UroLift  Voiding without difficulty  Miming mild dysuria  Urinalysis suspicious for UTI  Postvoid residual 78 cc      Urologic Review of Systems/Symptoms  Patient claims good urinary stream  Nocturia 1-2 times per night  Continues to take finasteride    Review of Systems  Hospitalization: None recent  All 14 categories of Review of Systems otherwise reviewed no other findings reported.   Meds reviewed  Past Medical History:   Diagnosis Date    Cancer ([address]     liver    Gout     Hyperlipidemia     Hypertension     Hypothyroidism due to acquired atrophy of thyroid 3/22/2018    Type II or unspecified type diabetes mellitus without mention of complication, not stated as uncontrolled      Past Surgical History:   Procedure Laterality Date    COLONOSCOPY      CYSTOSCOPY N/A 6/9/2021    RIGID CYSTOSCOPY performed by Alf Haddad MD at [address], 6/16/2021    [address] performed by Alf Haddad MD at [address] [facility] [address], DIAGNOSTIC      OTHER SURGICAL HISTORY Left 07/16/2019    cheek - a section of skin was frozen off     DE COLON CA SCRN NOT HI RSK IND N/A 11/21/2017    COLONOSCOPY performed by Yves Segura MD at [address] ESOPHAGOGASTRODUODENOSCOPY TRANSORAL DIAGNOSTIC N/A 3/21/2017    EGD ESOPHAGOGASTRODUODENOSCOPY performed by Asa Ormond, MD at [facility] 6/9/2021    [facility] performed by Alf Haddad MD at [facility] N/A 12/6/2016    EGD BIOPSY, Band[facility]     Socioeconomic History  Marital status:      Spouse name: None    Number of children: None    Years of education: None    Highest education level: None   Occupational History    None   Tobacco Use    Smoking status: Former Smoker     Packs/day: 1.00     Years: 13.00     Pack years: 13.00     Start date:      Quit date: 1975     Years since quittin.2    Smokeless tobacco: Never Used   Substance and Sexual Activity    Alcohol use: No    Drug use: No    Sexual activity: Never   Other Topics Concern    None   Social History Narrative    None     Social Determinants of Health     Financial Resource Strain:     Difficulty of Paying Living Expenses:    Food Insecurity:     Worried About Running Out of Food in the Last Year:     [address] in the Last Year:    Transportation Needs:     Lack of Transportation (Medical):  Lack of Transportation (Non-Medical):    Physical Activity:     Days of Exercise per Week:     Minutes of Exercise per Session:    Stress:     Feeling of Stress :    Social Connections:     Frequency of Communication with Friends and Family:     Frequency of Social Gatherings with Friends and Family:     Attends [employer] Services:     Active Member of Clubs or Organizations:     Attends Club or Organization Meetings:     Marital Status:    Intimate Partner Violence:     Fear of Current or Ex-Partner:     Emotionally Abused:     Physically Abused:     Sexually Abused:      History reviewed. No pertinent family history.   Current Outpatient Medications   Medication Sig Dispense Refill    ciprofloxacin (CIPRO) 500 MG tablet Take 1 tablet by mouth 2 times daily 14 tablet 0    spironolactone (ALDACTONE) 50 MG tablet Take 1 tablet by mouth daily 60 tablet 3    finasteride (PROSCAR) 5 MG tablet Take 1 tablet by mouth daily 90 tablet 3    metFORMIN (GLUCOPHAGE-XR) 500 MG extended release tablet Take 1 tablet by mouth daily (with breakfast) 30 tablet 5    levothyroxine (SYNTHROID) 50 MCG tablet Take 1 tablet by mouth Daily 90 tablet 1    allopurinol (ZYLOPRIM) 300 MG tablet Take 1 tablet by mouth daily 30 tablet 11    Cholecalciferol (VITAMIN D) 2000 units TABS tablet Take 1 tablet by mouth daily 30 tablet     nadolol (CORGARD) 20 MG tablet Take 20 mg by mouth daily      furosemide (LASIX) 40 MG tablet Take 40 mg by mouth 2 times daily       No current facility-administered medications for this visit. Lisinopril  All reviewed and verified by Dr Autumn Platt on today's visit    PSA   Date Value Ref Range Status   02/15/2016 4.4 ng/mL Final     Results for POC orders placed in visit on 10/01/21   POCT Urinalysis No Micro (Auto)   Result Value Ref Range    Color, UA yellow     Clarity, UA clear     Glucose, UA POC neg     Bilirubin, UA neg     Ketones, UA neg     Spec Grav, UA >=1.030     Blood, UA POC trace-intact     pH, UA 6.0     Protein, UA POC neg     Urobilinogen, UA 0.2     Leukocytes, UA moderate     Nitrite, UA positive    poct post void residual   Result Value Ref Range    post void residual 79 ml    Narrative    A point of care test   Post Void Residual was completed by performing  ultrasound scan of the bladder and  reviewed by Dr Autumn Platt       Physical Exam  Vitals:    10/01/21 0833   BP: 130/64   Pulse: 60   SpO2: 96%   Weight: 175 lb (79.4 kg)   Height: 5' 10\" (1.778 m)     Constitutional: Not in distress. Cardiovascular: Normal rate, BP reviewed. Normal  Pulmonary/Chest: Normal respiratory effort not short of breath  Abdominal: Not distended. Normal  Urologic Exam  Urinalysis nitrite positive  Postvoid residual 78 cc.   Assessment/Medical Necessity-Decision Making  Probable UTI  Food residual after UroLift procedure  Plan  Cipro 500 mg p.o. twice daily  Urine culture sent  Greater than 50% of 35 minutes spent consulting patient face-to-face  Orders Placed This Encounter   Procedures    Culture, Urine     Standing Status:   Future     Standing Expiration Date: 10/1/2022     Order Specific Question:   Specify (ex-cath, midstream, cysto, etc)? Answer:   m    POCT Urinalysis No Micro (Auto)    poct post void residual     Orders Placed This Encounter   Medications    ciprofloxacin (CIPRO) 500 MG tablet     Sig: Take 1 tablet by mouth 2 times daily     Dispense:  14 tablet     Refill:  0     Justin Greer MD       Please note this report has been partially produced using speech recognition software  And may cause contain errors related to that system including grammar, punctuation and spelling as well as words and phrases that may seem inappropriate. If there are questions or concerns please feel free to contact me to clarify.

## 2021-12-02 NOTE — TELEPHONE ENCOUNTER
The patient said he was out of refills for the Aldactone, but Shauna said he hadd 3 refills from Dr. Zeenat Heart

## 2021-12-20 RX ORDER — FINASTERIDE 5 MG/1
5 TABLET, FILM COATED ORAL DAILY
Qty: 90 TABLET | Refills: 3 | Status: SHIPPED | OUTPATIENT
Start: 2021-12-20

## 2021-12-20 NOTE — TELEPHONE ENCOUNTER
----- Message from Cameron Spears sent at 12/20/2021  9:28 AM EST -----  Regarding: advice  Patient is requesting Finasteride to be sent to [facility] in [place] [address]

## 2022-01-03 ENCOUNTER — OFFICE VISIT (OUTPATIENT)
Dept: UROLOGY | Age: 77
End: 2022-01-03
Payer: MEDICARE

## 2022-01-03 VITALS
SYSTOLIC BLOOD PRESSURE: 128 MMHG | HEART RATE: 61 BPM | DIASTOLIC BLOOD PRESSURE: 72 MMHG | BODY MASS INDEX: 25.05 KG/M2 | WEIGHT: 175 LBS | HEIGHT: 70 IN

## 2022-01-03 DIAGNOSIS — R33.9 URINARY RETENTION: Primary | ICD-10-CM

## 2022-01-03 LAB
BILIRUBIN, POC: ABNORMAL
BLOOD URINE, POC: ABNORMAL
CLARITY, POC: ABNORMAL
COLOR, POC: YELLOW
GLUCOSE URINE, POC: ABNORMAL
KETONES, POC: ABNORMAL
LEUKOCYTE EST, POC: ABNORMAL
NITRITE, POC: ABNORMAL
PH, POC: 6
POST VOID RESIDUAL (PVR): 27 ML
PROTEIN, POC: ABNORMAL
SPECIFIC GRAVITY, POC: 1.02
UROBILINOGEN, POC: 1

## 2022-01-03 PROCEDURE — 99999 PR OFFICE/OUTPT VISIT,PROCEDURE ONLY: CPT | Performed by: UROLOGY

## 2022-01-03 PROCEDURE — 81003 URINALYSIS AUTO W/O SCOPE: CPT | Performed by: UROLOGY

## 2022-01-03 PROCEDURE — 51798 US URINE CAPACITY MEASURE: CPT | Performed by: UROLOGY

## 2022-01-03 NOTE — PROGRESS NOTES
Urology  Patient here for postvoid residual urinalysis following his procedure  I was not able to see the patient  His residual is acceptable  Urinalysis clear  Postop check  No charge for today due to patient not being seen  Alexandra Cates MD

## 2022-12-27 RX ORDER — FINASTERIDE 5 MG/1
5 TABLET, FILM COATED ORAL DAILY
Qty: 90 TABLET | Refills: 3 | Status: SHIPPED | OUTPATIENT
Start: 2022-12-27

## 2022-12-27 NOTE — TELEPHONE ENCOUNTER
Requested Prescriptions     Pending Prescriptions Disp Refills    finasteride (PROSCAR) 5 MG tablet 90 tablet 3     Sig: Take 1 tablet by mouth daily

## 2023-01-01 ENCOUNTER — LAB (OUTPATIENT)
Dept: LAB | Facility: LAB | Age: 78
End: 2023-01-01
Payer: MEDICARE

## 2023-01-01 ENCOUNTER — OFFICE VISIT (OUTPATIENT)
Dept: PRIMARY CARE | Facility: CLINIC | Age: 78
End: 2023-01-01
Payer: MEDICARE

## 2023-01-01 ENCOUNTER — ANCILLARY PROCEDURE (OUTPATIENT)
Dept: RADIOLOGY | Facility: CLINIC | Age: 78
End: 2023-01-01
Payer: MEDICARE

## 2023-01-01 VITALS
SYSTOLIC BLOOD PRESSURE: 130 MMHG | HEART RATE: 64 BPM | BODY MASS INDEX: 25.43 KG/M2 | DIASTOLIC BLOOD PRESSURE: 75 MMHG | WEIGHT: 177.6 LBS | TEMPERATURE: 97.6 F | RESPIRATION RATE: 16 BRPM | HEIGHT: 70 IN | OXYGEN SATURATION: 97 %

## 2023-01-01 DIAGNOSIS — Z85.05 HISTORY OF LIVER CANCER: ICD-10-CM

## 2023-01-01 DIAGNOSIS — R80.9 TYPE 2 DIABETES MELLITUS WITH MICROALBUMINURIA, WITH LONG-TERM CURRENT USE OF INSULIN (MULTI): ICD-10-CM

## 2023-01-01 DIAGNOSIS — E11.29 TYPE 2 DIABETES MELLITUS WITH MICROALBUMINURIA, WITH LONG-TERM CURRENT USE OF INSULIN (MULTI): Primary | ICD-10-CM

## 2023-01-01 DIAGNOSIS — E79.0 HYPERURICEMIA: ICD-10-CM

## 2023-01-01 DIAGNOSIS — E11.29 TYPE 2 DIABETES MELLITUS WITH MICROALBUMINURIA, WITH LONG-TERM CURRENT USE OF INSULIN (MULTI): ICD-10-CM

## 2023-01-01 DIAGNOSIS — N18.2 STAGE 2 CHRONIC KIDNEY DISEASE: ICD-10-CM

## 2023-01-01 DIAGNOSIS — E78.5 HYPERLIPIDEMIA, UNSPECIFIED HYPERLIPIDEMIA TYPE: ICD-10-CM

## 2023-01-01 DIAGNOSIS — Z79.4 TYPE 2 DIABETES MELLITUS WITH MICROALBUMINURIA, WITH LONG-TERM CURRENT USE OF INSULIN (MULTI): ICD-10-CM

## 2023-01-01 DIAGNOSIS — E03.9 ACQUIRED HYPOTHYROIDISM: ICD-10-CM

## 2023-01-01 DIAGNOSIS — I10 ESSENTIAL HYPERTENSION: ICD-10-CM

## 2023-01-01 DIAGNOSIS — E03.9 ACQUIRED HYPOTHYROIDISM: Primary | ICD-10-CM

## 2023-01-01 DIAGNOSIS — E03.9 HYPOTHYROIDISM, UNSPECIFIED TYPE: ICD-10-CM

## 2023-01-01 DIAGNOSIS — Z23 NEED FOR VACCINATION: ICD-10-CM

## 2023-01-01 DIAGNOSIS — R80.9 TYPE 2 DIABETES MELLITUS WITH MICROALBUMINURIA, WITH LONG-TERM CURRENT USE OF INSULIN (MULTI): Primary | ICD-10-CM

## 2023-01-01 DIAGNOSIS — Z79.4 TYPE 2 DIABETES MELLITUS WITH MICROALBUMINURIA, WITH LONG-TERM CURRENT USE OF INSULIN (MULTI): Primary | ICD-10-CM

## 2023-01-01 LAB
ALBUMIN SERPL BCP-MCNC: 4.2 G/DL (ref 3.4–5)
ALP SERPL-CCNC: 127 U/L (ref 33–136)
ALT SERPL W P-5'-P-CCNC: 21 U/L (ref 10–52)
ANION GAP SERPL CALC-SCNC: 11 MMOL/L (ref 10–20)
AST SERPL W P-5'-P-CCNC: 36 U/L (ref 9–39)
BASOPHILS # BLD AUTO: 0.05 X10*3/UL (ref 0–0.1)
BASOPHILS NFR BLD AUTO: 1.4 %
BILIRUB SERPL-MCNC: 0.9 MG/DL (ref 0–1.2)
BUN SERPL-MCNC: 22 MG/DL (ref 6–23)
CALCIUM SERPL-MCNC: 10 MG/DL (ref 8.6–10.3)
CHLORIDE SERPL-SCNC: 103 MMOL/L (ref 98–107)
CHOLEST SERPL-MCNC: 111 MG/DL (ref 0–199)
CHOLESTEROL/HDL RATIO: 3.1
CO2 SERPL-SCNC: 26 MMOL/L (ref 21–32)
CREAT SERPL-MCNC: 1.32 MG/DL (ref 0.5–1.3)
CREAT UR-MCNC: 75.4 MG/DL (ref 20–370)
EOSINOPHIL # BLD AUTO: 0.17 X10*3/UL (ref 0–0.4)
EOSINOPHIL NFR BLD AUTO: 4.8 %
ERYTHROCYTE [DISTWIDTH] IN BLOOD BY AUTOMATED COUNT: 14.1 % (ref 11.5–14.5)
EST. AVERAGE GLUCOSE BLD GHB EST-MCNC: 146 MG/DL
GFR SERPL CREATININE-BSD FRML MDRD: 55 ML/MIN/1.73M*2
GLUCOSE SERPL-MCNC: 114 MG/DL (ref 74–99)
HBA1C MFR BLD: 6.7 %
HCT VFR BLD AUTO: 43.6 % (ref 41–52)
HDLC SERPL-MCNC: 35.7 MG/DL
HGB BLD-MCNC: 14.2 G/DL (ref 13.5–17.5)
IMM GRANULOCYTES # BLD AUTO: 0.01 X10*3/UL (ref 0–0.5)
IMM GRANULOCYTES NFR BLD AUTO: 0.3 % (ref 0–0.9)
LDLC SERPL CALC-MCNC: 58 MG/DL (ref 140–190)
LYMPHOCYTES # BLD AUTO: 0.89 X10*3/UL (ref 0.8–3)
LYMPHOCYTES NFR BLD AUTO: 25.1 %
MAGNESIUM SERPL-MCNC: 2 MG/DL (ref 1.6–2.4)
MCH RBC QN AUTO: 30.8 PG (ref 26–34)
MCHC RBC AUTO-ENTMCNC: 32.6 G/DL (ref 32–36)
MCV RBC AUTO: 95 FL (ref 80–100)
MICROALBUMIN UR-MCNC: 82 MG/L
MICROALBUMIN/CREAT UR: 108.8 UG/MG CREAT
MONOCYTES # BLD AUTO: 0.43 X10*3/UL (ref 0.05–0.8)
MONOCYTES NFR BLD AUTO: 12.1 %
NEUTROPHILS # BLD AUTO: 2 X10*3/UL (ref 1.6–5.5)
NEUTROPHILS NFR BLD AUTO: 56.3 %
NON HDL CHOLESTEROL: 75 MG/DL (ref 0–149)
NRBC BLD-RTO: 0 /100 WBCS (ref 0–0)
PLATELET # BLD AUTO: 180 X10*3/UL (ref 150–450)
PMV BLD AUTO: 10.6 FL (ref 7.5–11.5)
POTASSIUM SERPL-SCNC: 4.1 MMOL/L (ref 3.5–5.3)
PROT SERPL-MCNC: 8.2 G/DL (ref 6.4–8.2)
RBC # BLD AUTO: 4.61 X10*6/UL (ref 4.5–5.9)
SODIUM SERPL-SCNC: 136 MMOL/L (ref 136–145)
TRIGL SERPL-MCNC: 89 MG/DL (ref 0–149)
TSH SERPL-ACNC: 3.82 MIU/L (ref 0.44–3.98)
URATE SERPL-MCNC: 3.5 MG/DL (ref 4–7.5)
VLDL: 18 MG/DL (ref 0–40)
WBC # BLD AUTO: 3.6 X10*3/UL (ref 4.4–11.3)

## 2023-01-01 PROCEDURE — 83735 ASSAY OF MAGNESIUM: CPT

## 2023-01-01 PROCEDURE — 1036F TOBACCO NON-USER: CPT | Performed by: FAMILY MEDICINE

## 2023-01-01 PROCEDURE — 80050 GENERAL HEALTH PANEL: CPT

## 2023-01-01 PROCEDURE — 83036 HEMOGLOBIN GLYCOSYLATED A1C: CPT

## 2023-01-01 PROCEDURE — 76705 ECHO EXAM OF ABDOMEN: CPT | Performed by: RADIOLOGY

## 2023-01-01 PROCEDURE — 1159F MED LIST DOCD IN RCRD: CPT | Performed by: FAMILY MEDICINE

## 2023-01-01 PROCEDURE — 80061 LIPID PANEL: CPT

## 2023-01-01 PROCEDURE — 1160F RVW MEDS BY RX/DR IN RCRD: CPT | Performed by: FAMILY MEDICINE

## 2023-01-01 PROCEDURE — 36415 COLL VENOUS BLD VENIPUNCTURE: CPT

## 2023-01-01 PROCEDURE — 82043 UR ALBUMIN QUANTITATIVE: CPT

## 2023-01-01 PROCEDURE — G0008 ADMIN INFLUENZA VIRUS VAC: HCPCS | Performed by: FAMILY MEDICINE

## 2023-01-01 PROCEDURE — 3075F SYST BP GE 130 - 139MM HG: CPT | Performed by: FAMILY MEDICINE

## 2023-01-01 PROCEDURE — 3078F DIAST BP <80 MM HG: CPT | Performed by: FAMILY MEDICINE

## 2023-01-01 PROCEDURE — 82570 ASSAY OF URINE CREATININE: CPT

## 2023-01-01 PROCEDURE — 99214 OFFICE O/P EST MOD 30 MIN: CPT | Performed by: FAMILY MEDICINE

## 2023-01-01 PROCEDURE — 76705 ECHO EXAM OF ABDOMEN: CPT

## 2023-01-01 PROCEDURE — 84550 ASSAY OF BLOOD/URIC ACID: CPT

## 2023-01-01 PROCEDURE — 90662 IIV NO PRSV INCREASED AG IM: CPT | Performed by: FAMILY MEDICINE

## 2023-01-01 RX ORDER — FINASTERIDE 5 MG/1
5 TABLET, FILM COATED ORAL DAILY
COMMUNITY
Start: 2023-01-01 | End: 2024-01-01 | Stop reason: SDUPTHER

## 2023-01-01 RX ORDER — METFORMIN HYDROCHLORIDE 500 MG/1
500 TABLET, EXTENDED RELEASE ORAL
Qty: 90 TABLET | Refills: 3 | Status: SHIPPED
Start: 2023-01-01 | End: 2024-01-01 | Stop reason: HOSPADM

## 2023-01-01 RX ORDER — METFORMIN HYDROCHLORIDE 500 MG/1
500 TABLET, EXTENDED RELEASE ORAL
Qty: 90 TABLET | Refills: 1 | Status: SHIPPED | OUTPATIENT
Start: 2023-01-01 | End: 2023-01-01 | Stop reason: SDUPTHER

## 2023-01-01 RX ORDER — FUROSEMIDE 20 MG/1
20 TABLET ORAL DAILY
Qty: 90 TABLET | Refills: 3 | Status: SHIPPED
Start: 2023-01-01 | End: 2024-05-29

## 2023-01-01 RX ORDER — SPIRONOLACTONE 50 MG/1
50 TABLET, FILM COATED ORAL DAILY
Qty: 90 TABLET | Refills: 3 | Status: SHIPPED
Start: 2023-01-01 | End: 2024-05-29

## 2023-01-01 RX ORDER — SPIRONOLACTONE 50 MG/1
50 TABLET, FILM COATED ORAL DAILY
Qty: 90 TABLET | Refills: 1 | Status: SHIPPED | OUTPATIENT
Start: 2023-01-01 | End: 2023-01-01 | Stop reason: SDUPTHER

## 2023-01-01 RX ORDER — ROSUVASTATIN CALCIUM 5 MG/1
5 TABLET, COATED ORAL DAILY
Qty: 90 TABLET | Refills: 3 | Status: SHIPPED
Start: 2023-01-01 | End: 2024-05-29

## 2023-01-01 RX ORDER — LEVOTHYROXINE SODIUM 50 UG/1
50 TABLET ORAL
Qty: 90 TABLET | Refills: 0 | Status: SHIPPED | OUTPATIENT
Start: 2023-01-01 | End: 2023-01-01 | Stop reason: SDUPTHER

## 2023-01-01 RX ORDER — ALLOPURINOL 100 MG/1
100 TABLET ORAL DAILY
Qty: 90 TABLET | Refills: 3 | Status: SHIPPED
Start: 2023-01-01 | End: 2024-01-01 | Stop reason: HOSPADM

## 2023-01-01 RX ORDER — LEVOTHYROXINE SODIUM 50 UG/1
50 TABLET ORAL
Qty: 90 TABLET | Refills: 3 | Status: SHIPPED
Start: 2023-01-01 | End: 2024-05-29

## 2023-01-01 RX ORDER — ALLOPURINOL 300 MG/1
300 TABLET ORAL DAILY
Qty: 90 TABLET | Refills: 1 | Status: SHIPPED
Start: 2023-01-01 | End: 2023-01-01 | Stop reason: SDUPTHER

## 2023-01-01 ASSESSMENT — ENCOUNTER SYMPTOMS
JOINT SWELLING: 0
NAUSEA: 0
FACIAL ASYMMETRY: 0
SPEECH DIFFICULTY: 0
MYALGIAS: 0
FATIGUE: 0
ARTHRALGIAS: 0
SINUS PRESSURE: 0
TREMORS: 0
EYE REDNESS: 0
SHORTNESS OF BREATH: 0
WOUND: 0
VOMITING: 0
BACK PAIN: 0
SLEEP DISTURBANCE: 0
ABDOMINAL DISTENTION: 0
DIARRHEA: 0
WHEEZING: 0
NECK STIFFNESS: 0
UNEXPECTED WEIGHT CHANGE: 0
FLANK PAIN: 0
ACTIVITY CHANGE: 0
DYSURIA: 0
ABDOMINAL PAIN: 0
DYSPHORIC MOOD: 0
POLYDIPSIA: 0
NECK PAIN: 0
EYE PAIN: 0
PHOTOPHOBIA: 0
APPETITE CHANGE: 0
FEVER: 0
DIZZINESS: 0
ADENOPATHY: 0
NERVOUS/ANXIOUS: 0
HALLUCINATIONS: 0
BLOOD IN STOOL: 0
COUGH: 0
CONFUSION: 0
RHINORRHEA: 0
TROUBLE SWALLOWING: 0
HEMATURIA: 0
FREQUENCY: 0
CONSTIPATION: 0
CHILLS: 0
CHOKING: 0
EYE ITCHING: 0
BRUISES/BLEEDS EASILY: 0
WEAKNESS: 0
SEIZURES: 0
EYE DISCHARGE: 0
DIAPHORESIS: 0
PALPITATIONS: 0
HEADACHES: 0
LIGHT-HEADEDNESS: 0
NUMBNESS: 0
VOICE CHANGE: 0
HYPERTENSION: 1
AGITATION: 0
SORE THROAT: 0
CHEST TIGHTNESS: 0

## 2023-02-17 PROBLEM — E11.29 TYPE 2 DIABETES MELLITUS WITH MICROALBUMINURIA, WITH LONG-TERM CURRENT USE OF INSULIN (MULTI): Status: ACTIVE | Noted: 2023-02-17

## 2023-02-17 PROBLEM — E78.5 HYPERLIPIDEMIA: Status: ACTIVE | Noted: 2023-02-17

## 2023-02-17 PROBLEM — Z85.05 HISTORY OF LIVER CANCER: Status: ACTIVE | Noted: 2023-02-17

## 2023-02-17 PROBLEM — L57.0 ACTINIC KERATOSES: Status: ACTIVE | Noted: 2023-02-17

## 2023-02-17 PROBLEM — Z23 NEED FOR INFLUENZA VACCINATION: Status: ACTIVE | Noted: 2023-02-17

## 2023-02-17 PROBLEM — I10 ESSENTIAL HYPERTENSION: Status: ACTIVE | Noted: 2023-02-17

## 2023-02-17 PROBLEM — Z79.4 TYPE 2 DIABETES MELLITUS WITH MICROALBUMINURIA, WITH LONG-TERM CURRENT USE OF INSULIN (MULTI): Status: ACTIVE | Noted: 2023-02-17

## 2023-02-17 PROBLEM — E79.0 HYPERURICEMIA: Status: ACTIVE | Noted: 2023-02-17

## 2023-02-17 PROBLEM — R80.9 TYPE 2 DIABETES MELLITUS WITH MICROALBUMINURIA, WITH LONG-TERM CURRENT USE OF INSULIN (MULTI): Status: ACTIVE | Noted: 2023-02-17

## 2023-02-17 PROBLEM — N18.2 STAGE 2 CHRONIC KIDNEY DISEASE: Status: ACTIVE | Noted: 2023-02-17

## 2023-02-17 PROBLEM — E03.9 HYPOTHYROIDISM: Status: ACTIVE | Noted: 2023-02-17

## 2023-02-17 RX ORDER — METFORMIN HYDROCHLORIDE 500 MG/1
500 TABLET, EXTENDED RELEASE ORAL
COMMUNITY
Start: 2019-08-28 | End: 2023-01-01 | Stop reason: SDUPTHER

## 2023-02-17 RX ORDER — ACETAMINOPHEN 500 MG
50 TABLET ORAL DAILY
COMMUNITY

## 2023-02-17 RX ORDER — ROSUVASTATIN CALCIUM 5 MG/1
5 TABLET, COATED ORAL DAILY
COMMUNITY
Start: 2022-10-17 | End: 2023-05-22

## 2023-02-17 RX ORDER — FUROSEMIDE 20 MG/1
20 TABLET ORAL DAILY
COMMUNITY
End: 2023-01-01 | Stop reason: SDUPTHER

## 2023-02-17 RX ORDER — LEVOTHYROXINE SODIUM 50 UG/1
50 TABLET ORAL
COMMUNITY
Start: 2018-07-25 | End: 2023-01-01 | Stop reason: SDUPTHER

## 2023-02-17 RX ORDER — SPIRONOLACTONE 50 MG/1
50 TABLET, FILM COATED ORAL DAILY
COMMUNITY
End: 2023-01-01 | Stop reason: SDUPTHER

## 2023-02-17 RX ORDER — ALLOPURINOL 300 MG/1
300 TABLET ORAL DAILY
COMMUNITY
End: 2023-01-01 | Stop reason: SDUPTHER

## 2023-04-06 LAB
ALANINE AMINOTRANSFERASE (SGPT) (U/L) IN SER/PLAS: 23 U/L (ref 10–52)
ALBUMIN (G/DL) IN SER/PLAS: 4.4 G/DL (ref 3.4–5)
ALBUMIN (MG/L) IN URINE: 56.2 MG/L
ALBUMIN/CREATININE (UG/MG) IN URINE: 72.5 UG/MG CRT (ref 0–30)
ALKALINE PHOSPHATASE (U/L) IN SER/PLAS: 104 U/L (ref 33–136)
ANION GAP IN SER/PLAS: 12 MMOL/L (ref 10–20)
ASPARTATE AMINOTRANSFERASE (SGOT) (U/L) IN SER/PLAS: 27 U/L (ref 9–39)
BASOPHILS (10*3/UL) IN BLOOD BY AUTOMATED COUNT: 0.05 X10E9/L (ref 0–0.1)
BASOPHILS/100 LEUKOCYTES IN BLOOD BY AUTOMATED COUNT: 1.2 % (ref 0–2)
BILIRUBIN TOTAL (MG/DL) IN SER/PLAS: 1 MG/DL (ref 0–1.2)
CALCIUM (MG/DL) IN SER/PLAS: 9.8 MG/DL (ref 8.6–10.3)
CARBON DIOXIDE, TOTAL (MMOL/L) IN SER/PLAS: 26 MMOL/L (ref 21–32)
CHLORIDE (MMOL/L) IN SER/PLAS: 102 MMOL/L (ref 98–107)
CHOLESTEROL (MG/DL) IN SER/PLAS: 107 MG/DL (ref 0–199)
CHOLESTEROL IN HDL (MG/DL) IN SER/PLAS: 38.2 MG/DL
CHOLESTEROL/HDL RATIO: 2.8
CREATININE (MG/DL) IN SER/PLAS: 1.05 MG/DL (ref 0.5–1.3)
CREATININE (MG/DL) IN URINE: 77.5 MG/DL (ref 20–370)
EOSINOPHILS (10*3/UL) IN BLOOD BY AUTOMATED COUNT: 0.14 X10E9/L (ref 0–0.4)
EOSINOPHILS/100 LEUKOCYTES IN BLOOD BY AUTOMATED COUNT: 3.2 % (ref 0–6)
ERYTHROCYTE DISTRIBUTION WIDTH (RATIO) BY AUTOMATED COUNT: 13.4 % (ref 11.5–14.5)
ERYTHROCYTE MEAN CORPUSCULAR HEMOGLOBIN CONCENTRATION (G/DL) BY AUTOMATED: 33 G/DL (ref 32–36)
ERYTHROCYTE MEAN CORPUSCULAR VOLUME (FL) BY AUTOMATED COUNT: 95 FL (ref 80–100)
ERYTHROCYTES (10*6/UL) IN BLOOD BY AUTOMATED COUNT: 4.32 X10E12/L (ref 4.5–5.9)
ESTIMATED AVERAGE GLUCOSE FOR HBA1C: 160 MG/DL
GFR MALE: 72 ML/MIN/1.73M2
GLUCOSE (MG/DL) IN SER/PLAS: 140 MG/DL (ref 74–99)
HEMATOCRIT (%) IN BLOOD BY AUTOMATED COUNT: 41.2 % (ref 41–52)
HEMOGLOBIN (G/DL) IN BLOOD: 13.6 G/DL (ref 13.5–17.5)
HEMOGLOBIN A1C/HEMOGLOBIN TOTAL IN BLOOD: 7.2 %
IMMATURE GRANULOCYTES/100 LEUKOCYTES IN BLOOD BY AUTOMATED COUNT: 0.2 % (ref 0–0.9)
LDL: 49 MG/DL (ref 0–99)
LEUKOCYTES (10*3/UL) IN BLOOD BY AUTOMATED COUNT: 4.3 X10E9/L (ref 4.4–11.3)
LYMPHOCYTES (10*3/UL) IN BLOOD BY AUTOMATED COUNT: 0.94 X10E9/L (ref 0.8–3)
LYMPHOCYTES/100 LEUKOCYTES IN BLOOD BY AUTOMATED COUNT: 21.8 % (ref 13–44)
MAGNESIUM (MG/DL) IN SER/PLAS: 1.66 MG/DL (ref 1.6–2.4)
MONOCYTES (10*3/UL) IN BLOOD BY AUTOMATED COUNT: 0.4 X10E9/L (ref 0.05–0.8)
MONOCYTES/100 LEUKOCYTES IN BLOOD BY AUTOMATED COUNT: 9.3 % (ref 2–10)
NEUTROPHILS (10*3/UL) IN BLOOD BY AUTOMATED COUNT: 2.78 X10E9/L (ref 1.6–5.5)
NEUTROPHILS/100 LEUKOCYTES IN BLOOD BY AUTOMATED COUNT: 64.3 % (ref 40–80)
PLATELETS (10*3/UL) IN BLOOD AUTOMATED COUNT: 159 X10E9/L (ref 150–450)
POTASSIUM (MMOL/L) IN SER/PLAS: 4.2 MMOL/L (ref 3.5–5.3)
PROTEIN TOTAL: 7.8 G/DL (ref 6.4–8.2)
SODIUM (MMOL/L) IN SER/PLAS: 136 MMOL/L (ref 136–145)
THYROTROPIN (MIU/L) IN SER/PLAS BY DETECTION LIMIT <= 0.05 MIU/L: 3.81 MIU/L (ref 0.44–3.98)
TRIGLYCERIDE (MG/DL) IN SER/PLAS: 101 MG/DL (ref 0–149)
UREA NITROGEN (MG/DL) IN SER/PLAS: 11 MG/DL (ref 6–23)
VLDL: 20 MG/DL (ref 0–40)

## 2023-04-07 NOTE — PROGRESS NOTES
Subjective   Patient ID: Mu Elise is a 77 y.o. male who presents for 6 month check up (And follow up labs ) and Medicare Annual Wellness Visit Subsequent.    Hypertension  This is a chronic problem. The current episode started more than 1 year ago. The problem is unchanged. The problem is controlled. Pertinent negatives include no chest pain, headaches, neck pain, palpitations or shortness of breath. Identifiable causes of hypertension include a thyroid problem.   Thyroid Problem  Presents for follow-up visit. Patient reports no anxiety, cold intolerance, constipation, diaphoresis, diarrhea, fatigue, heat intolerance, palpitations or tremors. His past medical history is significant for hyperlipidemia.   Diabetes  He presents for his follow-up diabetic visit. He has type 2 diabetes mellitus. His disease course has been worsening. Pertinent negatives for hypoglycemia include no confusion, dizziness, headaches, nervousness/anxiousness, seizures, speech difficulty or tremors. Pertinent negatives for diabetes include no chest pain, no fatigue, no polydipsia, no polyuria and no weakness. Symptoms are stable. Current diabetic treatment includes oral agent (monotherapy). He is compliant with treatment all of the time. He participates in exercise daily. An ACE inhibitor/angiotensin II receptor blocker is contraindicated. He does not see a podiatrist.Eye exam is current.   Hyperlipidemia  This is a chronic problem. The current episode started more than 1 year ago. The problem is controlled. Recent lipid tests were reviewed and are low. Pertinent negatives include no chest pain, myalgias or shortness of breath. Current antihyperlipidemic treatment includes statins. The current treatment provides significant improvement of lipids. There are no compliance problems.         Review of Systems   Constitutional:  Negative for activity change, appetite change, chills, diaphoresis, fatigue, fever and unexpected weight change.  "  HENT:  Negative for congestion, ear pain, hearing loss, nosebleeds, postnasal drip, rhinorrhea, sinus pressure, sneezing, sore throat, tinnitus, trouble swallowing and voice change.    Eyes:  Negative for photophobia, pain, discharge, redness, itching and visual disturbance.   Respiratory:  Negative for cough, choking, chest tightness, shortness of breath and wheezing.    Cardiovascular:  Negative for chest pain, palpitations and leg swelling.   Gastrointestinal:  Negative for abdominal distention, abdominal pain, blood in stool, constipation, diarrhea, nausea and vomiting.   Endocrine: Negative for cold intolerance, heat intolerance, polydipsia and polyuria.   Genitourinary:  Negative for dysuria, flank pain, frequency, hematuria and urgency.   Musculoskeletal:  Negative for arthralgias, back pain, joint swelling, myalgias, neck pain and neck stiffness.   Skin:  Negative for rash and wound.   Allergic/Immunologic: Negative for immunocompromised state.   Neurological:  Negative for dizziness, tremors, seizures, syncope, facial asymmetry, speech difficulty, weakness, light-headedness, numbness and headaches.   Hematological:  Negative for adenopathy. Does not bruise/bleed easily.   Psychiatric/Behavioral:  Negative for agitation, behavioral problems, confusion, dysphoric mood, hallucinations, self-injury, sleep disturbance and suicidal ideas. The patient is not nervous/anxious.        Objective   /71 (BP Location: Right arm, Patient Position: Sitting, BP Cuff Size: Adult)   Pulse 70   Temp 36.2 °C (97.2 °F) (Temporal)   Resp 16   Ht 1.778 m (5' 10\")   Wt 84.8 kg (187 lb)   SpO2 97%   BMI 26.83 kg/m²     Physical Exam  Constitutional:       General: He is not in acute distress.     Appearance: He is not ill-appearing or diaphoretic.   HENT:      Head: Normocephalic and atraumatic.      Right Ear: External ear normal.      Left Ear: External ear normal.      Nose: Nose normal. No rhinorrhea.   Eyes:      " General: Lids are normal. No scleral icterus.        Right eye: No discharge.         Left eye: No discharge.      Conjunctiva/sclera: Conjunctivae normal.   Cardiovascular:      Rate and Rhythm: Normal rate and regular rhythm.      Pulses: Normal pulses.      Heart sounds: No murmur heard.  Pulmonary:      Effort: Pulmonary effort is normal. No respiratory distress.      Breath sounds: No decreased breath sounds, wheezing, rhonchi or rales.   Abdominal:      General: Bowel sounds are normal. There is no distension.      Palpations: Abdomen is soft. There is no mass.      Tenderness: There is no abdominal tenderness. There is no guarding or rebound.   Musculoskeletal:         General: No swelling, tenderness or deformity.      Cervical back: No rigidity or tenderness.      Right lower leg: No edema.      Left lower leg: No edema.   Lymphadenopathy:      Cervical: No cervical adenopathy.      Upper Body:      Right upper body: No supraclavicular adenopathy.      Left upper body: No supraclavicular adenopathy.   Skin:     General: Skin is warm and dry.      Coloration: Skin is not jaundiced or pale.      Findings: No erythema, lesion or rash.   Neurological:      General: No focal deficit present.      Mental Status: He is alert and oriented to person, place, and time.      Sensory: No sensory deficit.      Motor: No weakness or tremor.      Coordination: Coordination normal.      Gait: Gait normal.   Psychiatric:         Mood and Affect: Mood normal. Affect is not inappropriate.         Behavior: Behavior normal.         Assessment/Plan   Diagnoses and all orders for this visit:  Routine general medical examination at health care facility  -     Follow Up In [facility] - PCP; Future  Essential hypertension  -     Albumin , Urine Random; Future  -     CBC and Auto Differential; Future  -     Comprehensive Metabolic Panel; Future  -     Lipid Panel; Future  -     Magnesium; Future  -     TSH with reflex to  Free T4 if abnormal; Future  -     Follow Up In [facility] - PCP; Future  Hypothyroidism, unspecified type  -     Comprehensive Metabolic Panel; Future  -     Lipid Panel; Future  -     TSH with reflex to Free T4 if abnormal; Future  -     Follow Up In [facility] - PCP; Future  Type 2 diabetes mellitus with microalbuminuria, with long-term current use of insulin (CMS/[facility])  -     empagliflozin (Jardiance) 10 mg; Take 1 tablet (10 mg) by mouth once daily.  -     Albumin , Urine Random; Future  -     CBC and Auto Differential; Future  -     Comprehensive Metabolic Panel; Future  -     Hemoglobin A1C; Future  -     Lipid Panel; Future  -     Magnesium; Future  -     TSH with reflex to Free T4 if abnormal; Future  -     Follow Up In [facility] - PCP; Future  Hyperlipidemia, unspecified hyperlipidemia type  -     Comprehensive Metabolic Panel; Future  -     Lipid Panel; Future  -     TSH with reflex to Free T4 if abnormal; Future  -     Follow Up In [facility] - PCP; Future  Hyperuricemia  -     Uric Acid; Future  -     Follow Up In [facility] - PCP; Future

## 2023-04-12 ENCOUNTER — OFFICE VISIT (OUTPATIENT)
Dept: PRIMARY CARE | Facility: CLINIC | Age: 78
End: 2023-04-12
Payer: MEDICARE

## 2023-04-12 VITALS
SYSTOLIC BLOOD PRESSURE: 138 MMHG | HEART RATE: 70 BPM | WEIGHT: 187 LBS | BODY MASS INDEX: 26.77 KG/M2 | OXYGEN SATURATION: 97 % | DIASTOLIC BLOOD PRESSURE: 71 MMHG | RESPIRATION RATE: 16 BRPM | HEIGHT: 70 IN | TEMPERATURE: 97.2 F

## 2023-04-12 DIAGNOSIS — E78.5 HYPERLIPIDEMIA, UNSPECIFIED HYPERLIPIDEMIA TYPE: ICD-10-CM

## 2023-04-12 DIAGNOSIS — E03.9 HYPOTHYROIDISM, UNSPECIFIED TYPE: ICD-10-CM

## 2023-04-12 DIAGNOSIS — Z79.4 TYPE 2 DIABETES MELLITUS WITH MICROALBUMINURIA, WITH LONG-TERM CURRENT USE OF INSULIN (MULTI): ICD-10-CM

## 2023-04-12 DIAGNOSIS — R80.9 TYPE 2 DIABETES MELLITUS WITH MICROALBUMINURIA, WITH LONG-TERM CURRENT USE OF INSULIN (MULTI): ICD-10-CM

## 2023-04-12 DIAGNOSIS — I10 ESSENTIAL HYPERTENSION: ICD-10-CM

## 2023-04-12 DIAGNOSIS — Z00.00 ROUTINE GENERAL MEDICAL EXAMINATION AT HEALTH CARE FACILITY: Primary | ICD-10-CM

## 2023-04-12 DIAGNOSIS — E79.0 HYPERURICEMIA: ICD-10-CM

## 2023-04-12 DIAGNOSIS — E11.29 TYPE 2 DIABETES MELLITUS WITH MICROALBUMINURIA, WITH LONG-TERM CURRENT USE OF INSULIN (MULTI): ICD-10-CM

## 2023-04-12 PROBLEM — K63.5 POLYP OF COLON: Status: ACTIVE | Noted: 2023-04-12

## 2023-04-12 PROBLEM — E55.9 VITAMIN D DEFICIENCY: Status: ACTIVE | Noted: 2018-03-22

## 2023-04-12 PROBLEM — N31.9 NEUROPATHIC BLADDER: Status: ACTIVE | Noted: 2017-03-09

## 2023-04-12 PROBLEM — E03.4 HYPOTHYROIDISM DUE TO ACQUIRED ATROPHY OF THYROID: Status: ACTIVE | Noted: 2018-03-22

## 2023-04-12 PROBLEM — N40.1 BENIGN PROSTATIC HYPERPLASIA WITH URINARY OBSTRUCTION: Status: ACTIVE | Noted: 2017-08-22

## 2023-04-12 PROBLEM — N13.8 BENIGN PROSTATIC HYPERPLASIA WITH URINARY OBSTRUCTION: Status: ACTIVE | Noted: 2017-08-22

## 2023-04-12 PROCEDURE — 99214 OFFICE O/P EST MOD 30 MIN: CPT | Performed by: FAMILY MEDICINE

## 2023-04-12 PROCEDURE — G0439 PPPS, SUBSEQ VISIT: HCPCS | Performed by: FAMILY MEDICINE

## 2023-04-12 PROCEDURE — 3078F DIAST BP <80 MM HG: CPT | Performed by: FAMILY MEDICINE

## 2023-04-12 PROCEDURE — 1159F MED LIST DOCD IN RCRD: CPT | Performed by: FAMILY MEDICINE

## 2023-04-12 PROCEDURE — 1170F FXNL STATUS ASSESSED: CPT | Performed by: FAMILY MEDICINE

## 2023-04-12 PROCEDURE — 3075F SYST BP GE 130 - 139MM HG: CPT | Performed by: FAMILY MEDICINE

## 2023-04-12 PROCEDURE — 1036F TOBACCO NON-USER: CPT | Performed by: FAMILY MEDICINE

## 2023-04-12 PROCEDURE — 1160F RVW MEDS BY RX/DR IN RCRD: CPT | Performed by: FAMILY MEDICINE

## 2023-04-12 ASSESSMENT — ACTIVITIES OF DAILY LIVING (ADL)
TAKING_MEDICATION: INDEPENDENT
DRESSING: INDEPENDENT
GROCERY_SHOPPING: INDEPENDENT
BATHING: INDEPENDENT
DOING_HOUSEWORK: INDEPENDENT
MANAGING_FINANCES: INDEPENDENT

## 2023-04-12 ASSESSMENT — ENCOUNTER SYMPTOMS
NAUSEA: 0
BACK PAIN: 0
EYE DISCHARGE: 0
OCCASIONAL FEELINGS OF UNSTEADINESS: 0
SEIZURES: 0
ADENOPATHY: 0
RHINORRHEA: 0
CHOKING: 0
FACIAL ASYMMETRY: 0
DYSURIA: 0
APPETITE CHANGE: 0
FREQUENCY: 0
PALPITATIONS: 0
HEADACHES: 0
CHILLS: 0
UNEXPECTED WEIGHT CHANGE: 0
WEAKNESS: 0
ABDOMINAL DISTENTION: 0
HEMATURIA: 0
NECK STIFFNESS: 0
CONFUSION: 0
ARTHRALGIAS: 0
EYE PAIN: 0
CONSTIPATION: 0
BLOOD IN STOOL: 0
NUMBNESS: 0
SINUS PRESSURE: 0
CHEST TIGHTNESS: 0
ACTIVITY CHANGE: 0
SLEEP DISTURBANCE: 0
EYE ITCHING: 0
WOUND: 0
BRUISES/BLEEDS EASILY: 0
FEVER: 0
FLANK PAIN: 0
VOMITING: 0
DYSPHORIC MOOD: 0
ABDOMINAL PAIN: 0
HYPERTENSION: 1
LOSS OF SENSATION IN FEET: 0
JOINT SWELLING: 0
PHOTOPHOBIA: 0
TREMORS: 0
AGITATION: 0
EYE REDNESS: 0
COUGH: 0
FATIGUE: 0
DEPRESSION: 0
DIARRHEA: 0
MYALGIAS: 0
NERVOUS/ANXIOUS: 0
HALLUCINATIONS: 0
TROUBLE SWALLOWING: 0
LIGHT-HEADEDNESS: 0
DIAPHORESIS: 0
VOICE CHANGE: 0
POLYDIPSIA: 0
SPEECH DIFFICULTY: 0
DIZZINESS: 0
SORE THROAT: 0
WHEEZING: 0
SHORTNESS OF BREATH: 0
NECK PAIN: 0

## 2023-04-12 ASSESSMENT — PATIENT HEALTH QUESTIONNAIRE - PHQ9
SUM OF ALL RESPONSES TO PHQ9 QUESTIONS 1 AND 2: 0
2. FEELING DOWN, DEPRESSED OR HOPELESS: NOT AT ALL
1. LITTLE INTEREST OR PLEASURE IN DOING THINGS: NOT AT ALL

## 2023-05-20 DIAGNOSIS — E11.29 TYPE 2 DIABETES MELLITUS WITH MICROALBUMINURIA, WITH LONG-TERM CURRENT USE OF INSULIN (MULTI): ICD-10-CM

## 2023-05-20 DIAGNOSIS — Z79.4 TYPE 2 DIABETES MELLITUS WITH MICROALBUMINURIA, WITH LONG-TERM CURRENT USE OF INSULIN (MULTI): ICD-10-CM

## 2023-05-20 DIAGNOSIS — R80.9 TYPE 2 DIABETES MELLITUS WITH MICROALBUMINURIA, WITH LONG-TERM CURRENT USE OF INSULIN (MULTI): ICD-10-CM

## 2023-05-20 DIAGNOSIS — E78.5 HYPERLIPIDEMIA, UNSPECIFIED HYPERLIPIDEMIA TYPE: Primary | ICD-10-CM

## 2023-05-22 RX ORDER — ROSUVASTATIN CALCIUM 5 MG/1
TABLET, COATED ORAL
Qty: 30 TABLET | Refills: 5 | Status: SHIPPED | OUTPATIENT
Start: 2023-05-22 | End: 2023-01-01 | Stop reason: SDUPTHER

## 2023-10-12 NOTE — PROGRESS NOTES
Subjective   Patient ID: Mu Elise is a 77 y.o. male who presents for Follow-up (Patient is being seen today for 6 month follow up to review labs. /Patient would like to discuss restless legs x 1 year on and off, patient states when its occurring it can be aggravating. ).    Hypertension  This is a chronic problem. The current episode started more than 1 year ago. The problem is unchanged. The problem is controlled. Pertinent negatives include no chest pain, headaches, neck pain, palpitations or shortness of breath. Agents associated with hypertension include thyroid hormones. Risk factors for coronary artery disease include diabetes mellitus, dyslipidemia and male gender. Past treatments include diuretics. The current treatment provides significant improvement. There are no compliance problems.  Hypertensive end-organ damage includes kidney disease. Identifiable causes of hypertension include a hypertension causing med and a thyroid problem.   Thyroid Problem  Presents for follow-up visit. Patient reports no anxiety, cold intolerance, constipation, diaphoresis, diarrhea, fatigue, heat intolerance, palpitations or tremors. The symptoms have been stable. His past medical history is significant for diabetes and hyperlipidemia.   Diabetes  He presents for his follow-up diabetic visit. He has type 2 diabetes mellitus. His disease course has been stable. Pertinent negatives for hypoglycemia include no confusion, dizziness, headaches, nervousness/anxiousness, seizures, speech difficulty or tremors. Pertinent negatives for diabetes include no chest pain, no fatigue, no polydipsia, no polyuria and no weakness. Symptoms are stable. Current diabetic treatment includes oral agent (dual therapy). He is compliant with treatment all of the time. His weight is stable. Meal planning includes avoidance of concentrated sweets. He has not had a previous visit with a dietitian. He participates in exercise daily. His home blood  glucose trend is fluctuating minimally. An ACE inhibitor/angiotensin II receptor blocker is contraindicated. Eye exam is current.   Hyperlipidemia  This is a chronic problem. The current episode started more than 1 year ago. The problem is controlled. Recent lipid tests were reviewed and are low. Exacerbating diseases include diabetes and hypothyroidism. Pertinent negatives include no chest pain, myalgias or shortness of breath. Current antihyperlipidemic treatment includes statins. The current treatment provides significant improvement of lipids. There are no compliance problems.         Review of Systems   Constitutional:  Negative for activity change, appetite change, chills, diaphoresis, fatigue, fever and unexpected weight change.   HENT:  Negative for congestion, ear pain, hearing loss, nosebleeds, postnasal drip, rhinorrhea, sinus pressure, sneezing, sore throat, tinnitus, trouble swallowing and voice change.    Eyes:  Negative for photophobia, pain, discharge, redness, itching and visual disturbance.   Respiratory:  Negative for cough, choking, chest tightness, shortness of breath and wheezing.    Cardiovascular:  Negative for chest pain, palpitations and leg swelling.   Gastrointestinal:  Negative for abdominal distention, abdominal pain, blood in stool, constipation, diarrhea, nausea and vomiting.   Endocrine: Negative for cold intolerance, heat intolerance, polydipsia and polyuria.   Genitourinary:  Negative for dysuria, flank pain, frequency, hematuria and urgency.   Musculoskeletal:  Negative for arthralgias, back pain, joint swelling, myalgias, neck pain and neck stiffness.   Skin:  Negative for rash and wound.   Allergic/Immunologic: Negative for immunocompromised state.   Neurological:  Negative for dizziness, tremors, seizures, syncope, facial asymmetry, speech difficulty, weakness, light-headedness, numbness and headaches.   Hematological:  Negative for adenopathy. Does not bruise/bleed easily.  "  Psychiatric/Behavioral:  Negative for agitation, behavioral problems, confusion, dysphoric mood, hallucinations, self-injury, sleep disturbance and suicidal ideas. The patient is not nervous/anxious.        Objective   /75 (BP Location: Right arm, Patient Position: Sitting)   Pulse 64   Temp 36.4 °C (97.6 °F)   Resp 16   Ht 1.778 m (5' 10\")   Wt 80.6 kg (177 lb 9.6 oz)   SpO2 97%   BMI 25.48 kg/m²     Physical Exam  Constitutional:       General: He is not in acute distress.     Appearance: He is not ill-appearing or diaphoretic.   HENT:      Head: Normocephalic and atraumatic.      Right Ear: External ear normal.      Left Ear: External ear normal.      Nose: Nose normal. No rhinorrhea.   Eyes:      General: Lids are normal. No scleral icterus.        Right eye: No discharge.         Left eye: No discharge.      Conjunctiva/sclera: Conjunctivae normal.   Cardiovascular:      Rate and Rhythm: Normal rate and regular rhythm.      Pulses: Normal pulses.      Heart sounds: No murmur heard.  Pulmonary:      Effort: Pulmonary effort is normal. No respiratory distress.      Breath sounds: No decreased breath sounds, wheezing, rhonchi or rales.   Abdominal:      General: Bowel sounds are normal. There is no distension.      Palpations: Abdomen is soft. There is no mass.      Tenderness: There is no abdominal tenderness. There is no guarding or rebound.   Musculoskeletal:         General: No swelling, tenderness or deformity.      Cervical back: No rigidity or tenderness.      Right lower leg: No edema.      Left lower leg: No edema.   Lymphadenopathy:      Cervical: No cervical adenopathy.      Upper Body:      Right upper body: No supraclavicular adenopathy.      Left upper body: No supraclavicular adenopathy.   Skin:     General: Skin is warm and dry.      Coloration: Skin is not jaundiced or pale.      Findings: No erythema, lesion or rash.   Neurological:      General: No focal deficit present.      " Mental Status: He is alert and oriented to person, place, and time.      Sensory: No sensory deficit.      Motor: No weakness or tremor.      Coordination: Coordination normal.      Gait: Gait normal.   Psychiatric:         Mood and Affect: Mood normal. Affect is not inappropriate.         Behavior: Behavior normal.         Assessment/Plan   Diagnoses and all orders for this visit:  Type 2 diabetes mellitus with microalbuminuria, with long-term current use of insulin (CMS/[facility])  -     Follow Up In [facility] - PCP  -     metFORMIN  mg 24 hr tablet; Take 1 tablet (500 mg) by mouth once daily with a meal.  -     empagliflozin (Jardiance) 10 mg; Take 1 tablet (10 mg) by mouth once daily.  -     rosuvastatin (Crestor) 5 mg tablet; Take 1 tablet (5 mg) by mouth once daily.  -     spironolactone (Aldactone) 50 mg tablet; Take 1 tablet (50 mg) by mouth once daily.  -     Albumin , Urine Random; Future  -     CBC and Auto Differential; Future  -     Comprehensive Metabolic Panel; Future  -     Hemoglobin A1C; Future  -     Lipid Panel; Future  -     Magnesium; Future  -     TSH with reflex to Free T4 if abnormal; Future  -     Follow Up In [facility] - PCP - Established; Future  Essential hypertension  -     Follow Up In [facility] - PCP  -     spironolactone (Aldactone) 50 mg tablet; Take 1 tablet (50 mg) by mouth once daily.  -     furosemide (Lasix) 20 mg tablet; Take 1 tablet (20 mg) by mouth once daily.  -     allopurinol (Zyloprim) 100 mg tablet; Take 1 tablet (100 mg) by mouth once daily.  -     Albumin , Urine Random; Future  -     CBC and Auto Differential; Future  -     Comprehensive Metabolic Panel; Future  -     Lipid Panel; Future  -     Magnesium; Future  -     TSH with reflex to Free T4 if abnormal; Future  -     Follow Up In [facility] - PCP - Established; Future  Hyperlipidemia, unspecified hyperlipidemia type  -     Follow Up In [facility] -  PCP  -     rosuvastatin (Crestor) 5 mg tablet; Take 1 tablet (5 mg) by mouth once daily.  -     Comprehensive Metabolic Panel; Future  -     Lipid Panel; Future  -     TSH with reflex to Free T4 if abnormal; Future  -     Follow Up In [facility] - PCP - Established; Future  Hyperuricemia  -     Follow Up In [facility] - PCP  -     allopurinol (Zyloprim) 100 mg tablet; Take 1 tablet (100 mg) by mouth once daily.  -     Uric Acid; Future  -     Follow Up In [facility] - PCP - Established; Future  Need for vaccination  -     Flu vaccine, quadrivalent, high-dose, preservative free, age 65y+ (FLUZONE)  -     Follow Up In [facility] - PCP - Established; Future  Acquired hypothyroidism  -     levothyroxine (Synthroid, Levoxyl) 50 mcg tablet; Take 1 tablet (50 mcg) by mouth once daily in the morning. Take before meals.  -     Comprehensive Metabolic Panel; Future  -     Lipid Panel; Future  -     TSH with reflex to Free T4 if abnormal; Future  -     Follow Up In [facility] - PCP - Established; Future  Stage 2 chronic kidney disease  -     empagliflozin (Jardiance) 10 mg; Take 1 tablet (10 mg) by mouth once daily.  -     spironolactone (Aldactone) 50 mg tablet; Take 1 tablet (50 mg) by mouth once daily.  -     Albumin , Urine Random; Future  -     Comprehensive Metabolic Panel; Future  -     Magnesium; Future  -     Uric Acid; Future  -     Follow Up In [facility] - PCP - Established; Future  History of liver cancer  -     US abdomen limited liver; Future  -     Follow Up In [facility] - PCP - Established; Future

## 2024-01-01 ENCOUNTER — NUTRITION (OUTPATIENT)
Dept: HEMATOLOGY/ONCOLOGY | Facility: CLINIC | Age: 79
End: 2024-01-01

## 2024-01-01 ENCOUNTER — OFFICE VISIT (OUTPATIENT)
Dept: PRIMARY CARE | Facility: CLINIC | Age: 79
End: 2024-01-01
Payer: MEDICARE

## 2024-01-01 ENCOUNTER — ONCOLOGY MEDICATION OUTREACH (OUTPATIENT)
Dept: HEMATOLOGY/ONCOLOGY | Facility: CLINIC | Age: 79
End: 2024-01-01
Payer: MEDICARE

## 2024-01-01 ENCOUNTER — MEDICATION MANAGEMENT (OUTPATIENT)
Dept: HEMATOLOGY/ONCOLOGY | Facility: CLINIC | Age: 79
End: 2024-01-01
Payer: MEDICARE

## 2024-01-01 ENCOUNTER — DOCUMENTATION (OUTPATIENT)
Dept: HEMATOLOGY/ONCOLOGY | Facility: HOSPITAL | Age: 79
End: 2024-01-01
Payer: MEDICARE

## 2024-01-01 ENCOUNTER — APPOINTMENT (OUTPATIENT)
Dept: RADIOLOGY | Facility: HOSPITAL | Age: 79
DRG: 432 | End: 2024-01-01
Payer: MEDICARE

## 2024-01-01 ENCOUNTER — TELEPHONE (OUTPATIENT)
Dept: PRIMARY CARE | Facility: CLINIC | Age: 79
End: 2024-01-01
Payer: MEDICARE

## 2024-01-01 ENCOUNTER — OFFICE VISIT (OUTPATIENT)
Dept: HEMATOLOGY/ONCOLOGY | Facility: CLINIC | Age: 79
End: 2024-01-01
Payer: MEDICARE

## 2024-01-01 ENCOUNTER — HOSPITAL ENCOUNTER (INPATIENT)
Facility: HOSPITAL | Age: 79
LOS: 2 days | Discharge: HOSPICE | DRG: 432 | End: 2024-05-10
Attending: EMERGENCY MEDICINE | Admitting: INTERNAL MEDICINE
Payer: MEDICARE

## 2024-01-01 ENCOUNTER — PATIENT OUTREACH (OUTPATIENT)
Dept: PRIMARY CARE | Facility: CLINIC | Age: 79
End: 2024-01-01
Payer: MEDICARE

## 2024-01-01 ENCOUNTER — NURSE TRIAGE (OUTPATIENT)
Dept: ADMISSION | Facility: HOSPITAL | Age: 79
End: 2024-01-01
Payer: MEDICARE

## 2024-01-01 ENCOUNTER — APPOINTMENT (OUTPATIENT)
Dept: CARDIOLOGY | Facility: HOSPITAL | Age: 79
DRG: 435 | End: 2024-01-01
Payer: MEDICARE

## 2024-01-01 ENCOUNTER — LAB (OUTPATIENT)
Dept: LAB | Facility: CLINIC | Age: 79
End: 2024-01-01
Payer: MEDICARE

## 2024-01-01 ENCOUNTER — APPOINTMENT (OUTPATIENT)
Dept: RADIOLOGY | Facility: HOSPITAL | Age: 79
DRG: 435 | End: 2024-01-01
Payer: MEDICARE

## 2024-01-01 ENCOUNTER — HOSPITAL ENCOUNTER (OUTPATIENT)
Dept: RADIOLOGY | Facility: HOSPITAL | Age: 79
Discharge: HOME | End: 2024-05-07
Payer: MEDICARE

## 2024-01-01 ENCOUNTER — APPOINTMENT (OUTPATIENT)
Dept: PRIMARY CARE | Facility: CLINIC | Age: 79
End: 2024-01-01
Payer: MEDICARE

## 2024-01-01 ENCOUNTER — INFUSION (OUTPATIENT)
Dept: HEMATOLOGY/ONCOLOGY | Facility: CLINIC | Age: 79
End: 2024-01-01
Payer: MEDICARE

## 2024-01-01 ENCOUNTER — HOSPITAL ENCOUNTER (INPATIENT)
Facility: HOSPITAL | Age: 79
LOS: 2 days | Discharge: HOME | DRG: 435 | End: 2024-03-24
Attending: EMERGENCY MEDICINE | Admitting: INTERNAL MEDICINE
Payer: MEDICARE

## 2024-01-01 ENCOUNTER — TELEPHONE (OUTPATIENT)
Dept: ADMISSION | Facility: HOSPITAL | Age: 79
End: 2024-01-01
Payer: MEDICARE

## 2024-01-01 VITALS
DIASTOLIC BLOOD PRESSURE: 69 MMHG | RESPIRATION RATE: 18 BRPM | TEMPERATURE: 97.2 F | SYSTOLIC BLOOD PRESSURE: 133 MMHG | OXYGEN SATURATION: 97 % | BODY MASS INDEX: 22.97 KG/M2 | HEART RATE: 71 BPM | WEIGHT: 157.19 LBS

## 2024-01-01 VITALS — WEIGHT: 157.19 LBS | BODY MASS INDEX: 22.55 KG/M2

## 2024-01-01 VITALS
HEIGHT: 70 IN | RESPIRATION RATE: 16 BRPM | HEART RATE: 74 BPM | SYSTOLIC BLOOD PRESSURE: 120 MMHG | TEMPERATURE: 97.9 F | OXYGEN SATURATION: 98 % | BODY MASS INDEX: 22.58 KG/M2 | WEIGHT: 157.7 LBS | DIASTOLIC BLOOD PRESSURE: 74 MMHG

## 2024-01-01 VITALS
HEIGHT: 69 IN | RESPIRATION RATE: 18 BRPM | OXYGEN SATURATION: 98 % | WEIGHT: 164.8 LBS | DIASTOLIC BLOOD PRESSURE: 70 MMHG | SYSTOLIC BLOOD PRESSURE: 139 MMHG | TEMPERATURE: 97 F | BODY MASS INDEX: 24.41 KG/M2 | HEART RATE: 65 BPM

## 2024-01-01 VITALS
TEMPERATURE: 97.3 F | DIASTOLIC BLOOD PRESSURE: 69 MMHG | RESPIRATION RATE: 18 BRPM | SYSTOLIC BLOOD PRESSURE: 109 MMHG | WEIGHT: 168 LBS | BODY MASS INDEX: 24.05 KG/M2 | OXYGEN SATURATION: 98 % | HEIGHT: 70 IN | HEART RATE: 65 BPM

## 2024-01-01 VITALS
OXYGEN SATURATION: 97 % | DIASTOLIC BLOOD PRESSURE: 71 MMHG | HEIGHT: 70 IN | WEIGHT: 180 LBS | BODY MASS INDEX: 25.77 KG/M2 | HEART RATE: 74 BPM | RESPIRATION RATE: 16 BRPM | TEMPERATURE: 97 F | SYSTOLIC BLOOD PRESSURE: 123 MMHG

## 2024-01-01 VITALS
HEART RATE: 63 BPM | RESPIRATION RATE: 18 BRPM | OXYGEN SATURATION: 99 % | WEIGHT: 169 LBS | DIASTOLIC BLOOD PRESSURE: 84 MMHG | SYSTOLIC BLOOD PRESSURE: 112 MMHG | HEIGHT: 70 IN | TEMPERATURE: 97.1 F | BODY MASS INDEX: 24.2 KG/M2

## 2024-01-01 VITALS
TEMPERATURE: 98.1 F | DIASTOLIC BLOOD PRESSURE: 63 MMHG | SYSTOLIC BLOOD PRESSURE: 117 MMHG | RESPIRATION RATE: 18 BRPM | BODY MASS INDEX: 24.45 KG/M2 | HEART RATE: 75 BPM | OXYGEN SATURATION: 97 % | WEIGHT: 170.42 LBS

## 2024-01-01 VITALS
TEMPERATURE: 96.6 F | RESPIRATION RATE: 16 BRPM | HEIGHT: 70 IN | OXYGEN SATURATION: 96 % | DIASTOLIC BLOOD PRESSURE: 76 MMHG | HEART RATE: 67 BPM | SYSTOLIC BLOOD PRESSURE: 138 MMHG | WEIGHT: 171 LBS | BODY MASS INDEX: 24.48 KG/M2

## 2024-01-01 VITALS
SYSTOLIC BLOOD PRESSURE: 119 MMHG | OXYGEN SATURATION: 97 % | DIASTOLIC BLOOD PRESSURE: 63 MMHG | TEMPERATURE: 98.2 F | RESPIRATION RATE: 18 BRPM | HEART RATE: 83 BPM

## 2024-01-01 DIAGNOSIS — J01.90 ACUTE NON-RECURRENT SINUSITIS, UNSPECIFIED LOCATION: Primary | ICD-10-CM

## 2024-01-01 DIAGNOSIS — Z11.59 ENCOUNTER FOR HEPATITIS C SCREENING TEST FOR LOW RISK PATIENT: ICD-10-CM

## 2024-01-01 DIAGNOSIS — R80.9 TYPE 2 DIABETES MELLITUS WITH MICROALBUMINURIA, WITH LONG-TERM CURRENT USE OF INSULIN (MULTI): ICD-10-CM

## 2024-01-01 DIAGNOSIS — E11.29 TYPE 2 DIABETES MELLITUS WITH MICROALBUMINURIA, WITH LONG-TERM CURRENT USE OF INSULIN (MULTI): Primary | ICD-10-CM

## 2024-01-01 DIAGNOSIS — E11.29 TYPE 2 DIABETES MELLITUS WITH MICROALBUMINURIA, WITH LONG-TERM CURRENT USE OF INSULIN (MULTI): ICD-10-CM

## 2024-01-01 DIAGNOSIS — K76.9 LIVER LESION: ICD-10-CM

## 2024-01-01 DIAGNOSIS — C22.0 HEPATOCELLULAR CARCINOMA (MULTI): Primary | ICD-10-CM

## 2024-01-01 DIAGNOSIS — K76.9 LIVER LESION: Primary | ICD-10-CM

## 2024-01-01 DIAGNOSIS — G25.81 RLS (RESTLESS LEGS SYNDROME): Primary | ICD-10-CM

## 2024-01-01 DIAGNOSIS — K70.31 ALCOHOLIC CIRRHOSIS OF LIVER WITH ASCITES (MULTI): ICD-10-CM

## 2024-01-01 DIAGNOSIS — R80.9 TYPE 2 DIABETES MELLITUS WITH MICROALBUMINURIA, WITH LONG-TERM CURRENT USE OF INSULIN (MULTI): Primary | ICD-10-CM

## 2024-01-01 DIAGNOSIS — C22.0 HEPATOCELLULAR CARCINOMA (MULTI): ICD-10-CM

## 2024-01-01 DIAGNOSIS — I81 PORTAL VEIN THROMBOSIS: ICD-10-CM

## 2024-01-01 DIAGNOSIS — N18.2 STAGE 2 CHRONIC KIDNEY DISEASE: ICD-10-CM

## 2024-01-01 DIAGNOSIS — N40.1 BENIGN PROSTATIC HYPERPLASIA WITH URINARY OBSTRUCTION: ICD-10-CM

## 2024-01-01 DIAGNOSIS — Z79.4 TYPE 2 DIABETES MELLITUS WITH MICROALBUMINURIA, WITH LONG-TERM CURRENT USE OF INSULIN (MULTI): Primary | ICD-10-CM

## 2024-01-01 DIAGNOSIS — N13.8 BENIGN PROSTATIC HYPERPLASIA WITH URINARY OBSTRUCTION: ICD-10-CM

## 2024-01-01 DIAGNOSIS — N17.9 AKI (ACUTE KIDNEY INJURY) (CMS-HCC): Primary | ICD-10-CM

## 2024-01-01 DIAGNOSIS — K92.1 HEMATOCHEZIA: ICD-10-CM

## 2024-01-01 DIAGNOSIS — R11.2 NAUSEA AND VOMITING, UNSPECIFIED VOMITING TYPE: ICD-10-CM

## 2024-01-01 DIAGNOSIS — Z79.4 TYPE 2 DIABETES MELLITUS WITH MICROALBUMINURIA, WITH LONG-TERM CURRENT USE OF INSULIN (MULTI): ICD-10-CM

## 2024-01-01 LAB
ABO GROUP (TYPE) IN BLOOD: NORMAL
ACTH PLAS-MCNC: 22.3 PG/ML (ref 7.2–63.3)
AFP SERPL-MCNC: 743 NG/ML (ref 0–9)
AFP SERPL-MCNC: 749 NG/ML (ref 0–9)
AFP SERPL-MCNC: 909 NG/ML (ref 0–9)
ALBUMIN FLD-MCNC: 0.7 G/DL
ALBUMIN SERPL BCP-MCNC: 2.9 G/DL (ref 3.4–5)
ALBUMIN SERPL BCP-MCNC: 3 G/DL (ref 3.4–5)
ALBUMIN SERPL BCP-MCNC: 3 G/DL (ref 3.4–5)
ALBUMIN SERPL BCP-MCNC: 3.1 G/DL (ref 3.4–5)
ALBUMIN SERPL BCP-MCNC: 3.2 G/DL (ref 3.4–5)
ALBUMIN SERPL BCP-MCNC: 3.3 G/DL (ref 3.4–5)
ALBUMIN SERPL BCP-MCNC: 3.3 G/DL (ref 3.4–5)
ALP SERPL-CCNC: 231 U/L (ref 33–136)
ALP SERPL-CCNC: 244 U/L (ref 33–136)
ALP SERPL-CCNC: 261 U/L (ref 33–136)
ALP SERPL-CCNC: 301 U/L (ref 33–136)
ALP SERPL-CCNC: 329 U/L (ref 33–136)
ALP SERPL-CCNC: 386 U/L (ref 33–136)
ALP SERPL-CCNC: 473 U/L (ref 33–136)
ALT SERPL W P-5'-P-CCNC: 15 U/L (ref 10–52)
ALT SERPL W P-5'-P-CCNC: 16 U/L (ref 10–52)
ALT SERPL W P-5'-P-CCNC: 19 U/L (ref 10–52)
ALT SERPL W P-5'-P-CCNC: 22 U/L (ref 10–52)
ALT SERPL W P-5'-P-CCNC: 22 U/L (ref 10–52)
ALT SERPL W P-5'-P-CCNC: 24 U/L (ref 10–52)
ALT SERPL W P-5'-P-CCNC: 27 U/L (ref 10–52)
AMMONIA PLAS-SCNC: 23 UMOL/L (ref 16–53)
ANION GAP SERPL CALC-SCNC: 11 MMOL/L (ref 10–20)
ANION GAP SERPL CALC-SCNC: 12 MMOL/L (ref 10–20)
ANION GAP SERPL CALC-SCNC: 15 MMOL/L (ref 10–20)
ANION GAP SERPL CALC-SCNC: 16 MMOL/L (ref 10–20)
ANION GAP SERPL CALC-SCNC: 17 MMOL/L (ref 10–20)
ANION GAP SERPL CALC-SCNC: 18 MMOL/L (ref 10–20)
ANION GAP SERPL CALC-SCNC: 22 MMOL/L (ref 10–20)
ANTIBODY SCREEN: NORMAL
APAP SERPL-MCNC: <10 UG/ML
APPEARANCE UR: ABNORMAL
APTT PPP: 35 SECONDS (ref 27–38)
AST SERPL W P-5'-P-CCNC: 142 U/L (ref 9–39)
AST SERPL W P-5'-P-CCNC: 161 U/L (ref 9–39)
AST SERPL W P-5'-P-CCNC: 187 U/L (ref 9–39)
AST SERPL W P-5'-P-CCNC: 31 U/L (ref 9–39)
AST SERPL W P-5'-P-CCNC: 40 U/L (ref 9–39)
AST SERPL W P-5'-P-CCNC: 42 U/L (ref 9–39)
AST SERPL W P-5'-P-CCNC: 63 U/L (ref 9–39)
ATRIAL RATE: 84 BPM
BACTERIA #/AREA URNS AUTO: ABNORMAL /HPF
BACTERIA FLD CULT: NORMAL
BACTERIA UR CULT: ABNORMAL
BASOPHILS # BLD AUTO: 0.03 X10*3/UL (ref 0–0.1)
BASOPHILS # BLD AUTO: 0.04 X10*3/UL (ref 0–0.1)
BASOPHILS # BLD AUTO: 0.05 X10*3/UL (ref 0–0.1)
BASOPHILS # BLD AUTO: 0.05 X10*3/UL (ref 0–0.1)
BASOPHILS # BLD AUTO: 0.06 X10*3/UL (ref 0–0.1)
BASOPHILS # BLD AUTO: 0.06 X10*3/UL (ref 0–0.1)
BASOPHILS NFR BLD AUTO: 0.4 %
BASOPHILS NFR BLD AUTO: 0.6 %
BASOPHILS NFR BLD AUTO: 0.8 %
BASOPHILS NFR BLD AUTO: 0.8 %
BASOPHILS NFR BLD AUTO: 0.9 %
BASOPHILS NFR BLD AUTO: 0.9 %
BASOPHILS NFR FLD MANUAL: 0 %
BILIRUB DIRECT SERPL-MCNC: 0.7 MG/DL (ref 0–0.3)
BILIRUB DIRECT SERPL-MCNC: 0.7 MG/DL (ref 0–0.3)
BILIRUB DIRECT SERPL-MCNC: 1 MG/DL (ref 0–0.3)
BILIRUB DIRECT SERPL-MCNC: 2.2 MG/DL (ref 0–0.3)
BILIRUB SERPL-MCNC: 1.5 MG/DL (ref 0–1.2)
BILIRUB SERPL-MCNC: 1.6 MG/DL (ref 0–1.2)
BILIRUB SERPL-MCNC: 1.8 MG/DL (ref 0–1.2)
BILIRUB SERPL-MCNC: 2 MG/DL (ref 0–1.2)
BILIRUB SERPL-MCNC: 4 MG/DL (ref 0–1.2)
BILIRUB SERPL-MCNC: 4.1 MG/DL (ref 0–1.2)
BILIRUB SERPL-MCNC: 4.3 MG/DL (ref 0–1.2)
BILIRUB UR STRIP.AUTO-MCNC: ABNORMAL MG/DL
BLASTS NFR FLD MANUAL: 0 %
BUN SERPL-MCNC: 24 MG/DL (ref 6–23)
BUN SERPL-MCNC: 31 MG/DL (ref 6–23)
BUN SERPL-MCNC: 33 MG/DL (ref 6–23)
BUN SERPL-MCNC: 44 MG/DL (ref 6–23)
BUN SERPL-MCNC: 56 MG/DL (ref 6–23)
CALCIUM SERPL-MCNC: 9.1 MG/DL (ref 8.6–10.3)
CALCIUM SERPL-MCNC: 9.1 MG/DL (ref 8.6–10.3)
CALCIUM SERPL-MCNC: 9.3 MG/DL (ref 8.6–10.3)
CALCIUM SERPL-MCNC: 9.4 MG/DL (ref 8.6–10.6)
CALCIUM SERPL-MCNC: 9.6 MG/DL (ref 8.6–10.3)
CALCIUM SERPL-MCNC: 9.7 MG/DL (ref 8.6–10.3)
CALCIUM SERPL-MCNC: 9.9 MG/DL (ref 8.6–10.3)
CHLORIDE SERPL-SCNC: 102 MMOL/L (ref 98–107)
CHLORIDE SERPL-SCNC: 102 MMOL/L (ref 98–107)
CHLORIDE SERPL-SCNC: 89 MMOL/L (ref 98–107)
CHLORIDE SERPL-SCNC: 90 MMOL/L (ref 98–107)
CHLORIDE SERPL-SCNC: 93 MMOL/L (ref 98–107)
CHLORIDE SERPL-SCNC: 97 MMOL/L (ref 98–107)
CHLORIDE SERPL-SCNC: 97 MMOL/L (ref 98–107)
CLARITY FLD: CLEAR
CO2 SERPL-SCNC: 18 MMOL/L (ref 21–32)
CO2 SERPL-SCNC: 21 MMOL/L (ref 21–32)
CO2 SERPL-SCNC: 24 MMOL/L (ref 21–32)
CO2 SERPL-SCNC: 24 MMOL/L (ref 21–32)
CO2 SERPL-SCNC: 25 MMOL/L (ref 21–32)
CO2 SERPL-SCNC: 25 MMOL/L (ref 21–32)
CO2 SERPL-SCNC: 26 MMOL/L (ref 21–32)
COLOR FLD: YELLOW
COLOR UR: ABNORMAL
CORTIS AM PEAK SERPL-MSCNC: 24.2 UG/DL (ref 5–20)
CORTIS AM PEAK SERPL-MSCNC: 36.2 UG/DL (ref 5–20)
CREAT SERPL-MCNC: 1.08 MG/DL (ref 0.5–1.3)
CREAT SERPL-MCNC: 1.1 MG/DL (ref 0.5–1.3)
CREAT SERPL-MCNC: 1.26 MG/DL (ref 0.5–1.3)
CREAT SERPL-MCNC: 1.39 MG/DL (ref 0.5–1.3)
CREAT SERPL-MCNC: 1.5 MG/DL (ref 0.5–1.3)
CREAT SERPL-MCNC: 1.64 MG/DL (ref 0.5–1.3)
CREAT SERPL-MCNC: 1.64 MG/DL (ref 0.5–1.3)
CRP SERPL-MCNC: 7.7 MG/DL
EGFRCR SERPLBLD CKD-EPI 2021: 42 ML/MIN/1.73M*2
EGFRCR SERPLBLD CKD-EPI 2021: 42 ML/MIN/1.73M*2
EGFRCR SERPLBLD CKD-EPI 2021: 47 ML/MIN/1.73M*2
EGFRCR SERPLBLD CKD-EPI 2021: 52 ML/MIN/1.73M*2
EGFRCR SERPLBLD CKD-EPI 2021: 58 ML/MIN/1.73M*2
EGFRCR SERPLBLD CKD-EPI 2021: 68 ML/MIN/1.73M*2
EGFRCR SERPLBLD CKD-EPI 2021: 70 ML/MIN/1.73M*2
EOSINOPHIL # BLD AUTO: 0.05 X10*3/UL (ref 0–0.4)
EOSINOPHIL # BLD AUTO: 0.08 X10*3/UL (ref 0–0.4)
EOSINOPHIL # BLD AUTO: 0.08 X10*3/UL (ref 0–0.4)
EOSINOPHIL # BLD AUTO: 0.09 X10*3/UL (ref 0–0.4)
EOSINOPHIL # BLD AUTO: 0.15 X10*3/UL (ref 0–0.4)
EOSINOPHIL # BLD AUTO: 0.16 X10*3/UL (ref 0–0.4)
EOSINOPHIL NFR BLD AUTO: 0.7 %
EOSINOPHIL NFR BLD AUTO: 1.2 %
EOSINOPHIL NFR BLD AUTO: 1.2 %
EOSINOPHIL NFR BLD AUTO: 1.4 %
EOSINOPHIL NFR BLD AUTO: 2.3 %
EOSINOPHIL NFR BLD AUTO: 2.7 %
EOSINOPHIL NFR FLD MANUAL: 0 %
ERYTHROCYTE [DISTWIDTH] IN BLOOD BY AUTOMATED COUNT: 15.5 % (ref 11.5–14.5)
ERYTHROCYTE [DISTWIDTH] IN BLOOD BY AUTOMATED COUNT: 15.5 % (ref 11.5–14.5)
ERYTHROCYTE [DISTWIDTH] IN BLOOD BY AUTOMATED COUNT: 15.7 % (ref 11.5–14.5)
ERYTHROCYTE [DISTWIDTH] IN BLOOD BY AUTOMATED COUNT: 15.9 % (ref 11.5–14.5)
ERYTHROCYTE [DISTWIDTH] IN BLOOD BY AUTOMATED COUNT: 17.1 % (ref 11.5–14.5)
ERYTHROCYTE [DISTWIDTH] IN BLOOD BY AUTOMATED COUNT: 17.3 % (ref 11.5–14.5)
ERYTHROCYTE [DISTWIDTH] IN BLOOD BY AUTOMATED COUNT: 17.8 % (ref 11.5–14.5)
ERYTHROCYTE [SEDIMENTATION RATE] IN BLOOD BY WESTERGREN METHOD: 80 MM/H (ref 0–20)
EST. AVERAGE GLUCOSE BLD GHB EST-MCNC: 126 MG/DL
ETHANOL SERPL-MCNC: <10 MG/DL
FERRITIN SERPL-MCNC: 334 NG/ML (ref 20–300)
FIBRINOGEN PPP-MCNC: 412 MG/DL (ref 200–400)
FLUAV RNA RESP QL NAA+PROBE: NOT DETECTED
FLUBV RNA RESP QL NAA+PROBE: NOT DETECTED
GLUCOSE SERPL-MCNC: 105 MG/DL (ref 74–99)
GLUCOSE SERPL-MCNC: 117 MG/DL (ref 74–99)
GLUCOSE SERPL-MCNC: 122 MG/DL (ref 74–99)
GLUCOSE SERPL-MCNC: 136 MG/DL (ref 74–99)
GLUCOSE SERPL-MCNC: 95 MG/DL (ref 74–99)
GLUCOSE SERPL-MCNC: 95 MG/DL (ref 74–99)
GLUCOSE SERPL-MCNC: 97 MG/DL (ref 74–99)
GLUCOSE UR STRIP.AUTO-MCNC: NORMAL MG/DL
GRAM STN SPEC: NORMAL
GRAM STN SPEC: NORMAL
HAV IGM SER QL: NONREACTIVE
HBA1C MFR BLD: 6 %
HBV CORE IGM SER QL: NONREACTIVE
HBV SURFACE AB SER-ACNC: <3.1 MIU/ML
HBV SURFACE AG SERPL QL IA: NONREACTIVE
HCT VFR BLD AUTO: 37.2 % (ref 41–52)
HCT VFR BLD AUTO: 37.9 % (ref 41–52)
HCT VFR BLD AUTO: 38.2 % (ref 41–52)
HCT VFR BLD AUTO: 38.3 % (ref 41–52)
HCT VFR BLD AUTO: 38.6 % (ref 41–52)
HCT VFR BLD AUTO: 38.8 % (ref 41–52)
HCT VFR BLD AUTO: 42.1 % (ref 41–52)
HCV AB SER QL: NONREACTIVE
HCV AB SER QL: NONREACTIVE
HGB BLD-MCNC: 11.9 G/DL (ref 13.5–17.5)
HGB BLD-MCNC: 12.2 G/DL (ref 13.5–17.5)
HGB BLD-MCNC: 12.5 G/DL (ref 13.5–17.5)
HGB BLD-MCNC: 12.6 G/DL (ref 13.5–17.5)
HGB BLD-MCNC: 12.6 G/DL (ref 13.5–17.5)
HGB BLD-MCNC: 13.3 G/DL (ref 13.5–17.5)
HGB BLD-MCNC: 13.7 G/DL (ref 13.5–17.5)
HGB RETIC QN: 31 PG (ref 28–38)
HOLD SPECIMEN: NORMAL
HYALINE CASTS #/AREA URNS AUTO: ABNORMAL /LPF
IMM GRANULOCYTES # BLD AUTO: 0.01 X10*3/UL (ref 0–0.5)
IMM GRANULOCYTES # BLD AUTO: 0.03 X10*3/UL (ref 0–0.5)
IMM GRANULOCYTES # BLD AUTO: 0.04 X10*3/UL (ref 0–0.5)
IMM GRANULOCYTES # BLD AUTO: 0.05 X10*3/UL (ref 0–0.5)
IMM GRANULOCYTES NFR BLD AUTO: 0.2 % (ref 0–0.9)
IMM GRANULOCYTES NFR BLD AUTO: 0.5 % (ref 0–0.9)
IMM GRANULOCYTES NFR BLD AUTO: 0.6 % (ref 0–0.9)
IMM GRANULOCYTES NFR BLD AUTO: 0.7 % (ref 0–0.9)
IMMATURE GRANULOCYTES IN FLUID: 0 %
IMMATURE RETIC FRACTION: 21.3 %
INR PPP: 1.3 (ref 0.9–1.1)
INR PPP: 1.4 (ref 0.9–1.1)
INR PPP: 1.5 (ref 0.9–1.1)
INR PPP: 1.5 (ref 0.9–1.1)
IRON SATN MFR SERPL: 33 % (ref 25–45)
IRON SERPL-MCNC: 94 UG/DL (ref 35–150)
KETONES UR STRIP.AUTO-MCNC: ABNORMAL MG/DL
LACTATE SERPL-SCNC: 1.5 MMOL/L (ref 0.4–2)
LACTATE SERPL-SCNC: 2 MMOL/L (ref 0.4–2)
LACTATE SERPL-SCNC: 2.8 MMOL/L (ref 0.4–2)
LDH FLD L TO P-CCNC: 48 U/L
LEUKOCYTE ESTERASE UR QL STRIP.AUTO: ABNORMAL
LIPASE SERPL-CCNC: 40 U/L (ref 9–82)
LYMPHOCYTES # BLD AUTO: 0.54 X10*3/UL (ref 0.8–3)
LYMPHOCYTES # BLD AUTO: 0.54 X10*3/UL (ref 0.8–3)
LYMPHOCYTES # BLD AUTO: 0.58 X10*3/UL (ref 0.8–3)
LYMPHOCYTES # BLD AUTO: 0.61 X10*3/UL (ref 0.8–3)
LYMPHOCYTES # BLD AUTO: 0.62 X10*3/UL (ref 0.8–3)
LYMPHOCYTES # BLD AUTO: 0.76 X10*3/UL (ref 0.8–3)
LYMPHOCYTES NFR BLD AUTO: 11.4 %
LYMPHOCYTES NFR BLD AUTO: 8.2 %
LYMPHOCYTES NFR BLD AUTO: 8.6 %
LYMPHOCYTES NFR BLD AUTO: 8.8 %
LYMPHOCYTES NFR BLD AUTO: 9.1 %
LYMPHOCYTES NFR BLD AUTO: 9.5 %
LYMPHOCYTES NFR FLD MANUAL: 15 %
MAGNESIUM SERPL-MCNC: 2.2 MG/DL (ref 1.6–2.4)
MAGNESIUM SERPL-MCNC: 2.32 MG/DL (ref 1.6–2.4)
MCH RBC QN AUTO: 27.2 PG (ref 26–34)
MCH RBC QN AUTO: 27.8 PG (ref 26–34)
MCH RBC QN AUTO: 27.9 PG (ref 26–34)
MCH RBC QN AUTO: 28.2 PG (ref 26–34)
MCH RBC QN AUTO: 28.7 PG (ref 26–34)
MCHC RBC AUTO-ENTMCNC: 32 G/DL (ref 32–36)
MCHC RBC AUTO-ENTMCNC: 32.2 G/DL (ref 32–36)
MCHC RBC AUTO-ENTMCNC: 32.4 G/DL (ref 32–36)
MCHC RBC AUTO-ENTMCNC: 32.5 G/DL (ref 32–36)
MCHC RBC AUTO-ENTMCNC: 32.9 G/DL (ref 32–36)
MCHC RBC AUTO-ENTMCNC: 33 G/DL (ref 32–36)
MCHC RBC AUTO-ENTMCNC: 34.3 G/DL (ref 32–36)
MCV RBC AUTO: 82 FL (ref 80–100)
MCV RBC AUTO: 83 FL (ref 80–100)
MCV RBC AUTO: 84 FL (ref 80–100)
MCV RBC AUTO: 84 FL (ref 80–100)
MCV RBC AUTO: 86 FL (ref 80–100)
MCV RBC AUTO: 87 FL (ref 80–100)
MCV RBC AUTO: 88 FL (ref 80–100)
MITOCHONDRIA AB SER QL IF: NEGATIVE
MONOCYTES # BLD AUTO: 0.56 X10*3/UL (ref 0.05–0.8)
MONOCYTES # BLD AUTO: 0.62 X10*3/UL (ref 0.05–0.8)
MONOCYTES # BLD AUTO: 0.65 X10*3/UL (ref 0.05–0.8)
MONOCYTES # BLD AUTO: 0.66 X10*3/UL (ref 0.05–0.8)
MONOCYTES # BLD AUTO: 0.66 X10*3/UL (ref 0.05–0.8)
MONOCYTES # BLD AUTO: 0.68 X10*3/UL (ref 0.05–0.8)
MONOCYTES NFR BLD AUTO: 10 %
MONOCYTES NFR BLD AUTO: 10.2 %
MONOCYTES NFR BLD AUTO: 10.3 %
MONOCYTES NFR BLD AUTO: 10.4 %
MONOCYTES NFR BLD AUTO: 7.7 %
MONOCYTES NFR BLD AUTO: 9.9 %
MONOS+MACROS NFR FLD MANUAL: 75 %
MUCOUS THREADS #/AREA URNS AUTO: ABNORMAL /LPF
NEUTROPHILS # BLD AUTO: 4.56 X10*3/UL (ref 1.6–5.5)
NEUTROPHILS # BLD AUTO: 4.91 X10*3/UL (ref 1.6–5.5)
NEUTROPHILS # BLD AUTO: 5.05 X10*3/UL (ref 1.6–5.5)
NEUTROPHILS # BLD AUTO: 5.19 X10*3/UL (ref 1.6–5.5)
NEUTROPHILS # BLD AUTO: 5.22 X10*3/UL (ref 1.6–5.5)
NEUTROPHILS # BLD AUTO: 5.92 X10*3/UL (ref 1.6–5.5)
NEUTROPHILS NFR BLD AUTO: 76.1 %
NEUTROPHILS NFR BLD AUTO: 76.5 %
NEUTROPHILS NFR BLD AUTO: 76.7 %
NEUTROPHILS NFR BLD AUTO: 78.9 %
NEUTROPHILS NFR BLD AUTO: 79.5 %
NEUTROPHILS NFR BLD AUTO: 81.9 %
NEUTROPHILS NFR FLD MANUAL: 10 %
NITRITE UR QL STRIP.AUTO: NEGATIVE
NRBC BLD-RTO: 0 /100 WBCS (ref 0–0)
NRBC BLD-RTO: ABNORMAL /100{WBCS}
NRBC BLD-RTO: ABNORMAL /100{WBCS}
OSMOLALITY SERPL: 281 MOSM/KG (ref 280–300)
OSMOLALITY UR: 528 MOSM/KG (ref 200–1200)
OTHER CELLS NFR FLD MANUAL: 0 %
P AXIS: 111 DEGREES
P OFFSET: 195 MS
P ONSET: 117 MS
PH UR STRIP.AUTO: 5.5 [PH]
PLASMA CELLS NFR FLD MANUAL: 0 %
PLATELET # BLD AUTO: 202 X10*3/UL (ref 150–450)
PLATELET # BLD AUTO: 235 X10*3/UL (ref 150–450)
PLATELET # BLD AUTO: 245 X10*3/UL (ref 150–450)
PLATELET # BLD AUTO: 246 X10*3/UL (ref 150–450)
PLATELET # BLD AUTO: 260 X10*3/UL (ref 150–450)
PLATELET # BLD AUTO: 288 X10*3/UL (ref 150–450)
PLATELET # BLD AUTO: 304 X10*3/UL (ref 150–450)
POTASSIUM SERPL-SCNC: 3.9 MMOL/L (ref 3.5–5.3)
POTASSIUM SERPL-SCNC: 4 MMOL/L (ref 3.5–5.3)
POTASSIUM SERPL-SCNC: 4.1 MMOL/L (ref 3.5–5.3)
POTASSIUM SERPL-SCNC: 4.3 MMOL/L (ref 3.5–5.3)
POTASSIUM SERPL-SCNC: 4.4 MMOL/L (ref 3.5–5.3)
POTASSIUM SERPL-SCNC: 4.5 MMOL/L (ref 3.5–5.3)
POTASSIUM SERPL-SCNC: 4.9 MMOL/L (ref 3.5–5.3)
PR INTERVAL: 212 MS
PROT FLD-MCNC: 1.6 G/DL
PROT SERPL-MCNC: 6.7 G/DL (ref 6.4–8.2)
PROT SERPL-MCNC: 6.9 G/DL (ref 6.4–8.2)
PROT SERPL-MCNC: 7 G/DL (ref 6.4–8.2)
PROT SERPL-MCNC: 7.3 G/DL (ref 6.4–8.2)
PROT SERPL-MCNC: 7.3 G/DL (ref 6.4–8.2)
PROT SERPL-MCNC: 7.5 G/DL (ref 6.4–8.2)
PROT SERPL-MCNC: 7.9 G/DL (ref 6.4–8.2)
PROT UR STRIP.AUTO-MCNC: ABNORMAL MG/DL
PROTHROMBIN TIME: 15 SECONDS (ref 9.8–12.8)
PROTHROMBIN TIME: 16.1 SECONDS (ref 9.8–12.8)
PROTHROMBIN TIME: 16.4 SECONDS (ref 9.8–12.8)
PROTHROMBIN TIME: 16.4 SECONDS (ref 9.8–12.8)
Q ONSET: 223 MS
QRS COUNT: 13 BEATS
QRS DURATION: 74 MS
QT INTERVAL: 390 MS
QTC CALCULATION(BAZETT): 460 MS
QTC FREDERICIA: 436 MS
R AXIS: -9 DEGREES
RBC # BLD AUTO: 4.26 X10*6/UL (ref 4.5–5.9)
RBC # BLD AUTO: 4.33 X10*6/UL (ref 4.5–5.9)
RBC # BLD AUTO: 4.5 X10*6/UL (ref 4.5–5.9)
RBC # BLD AUTO: 4.63 X10*6/UL (ref 4.5–5.9)
RBC # BLD AUTO: 4.64 X10*6/UL (ref 4.5–5.9)
RBC # BLD AUTO: 4.64 X10*6/UL (ref 4.5–5.9)
RBC # BLD AUTO: 5.04 X10*6/UL (ref 4.5–5.9)
RBC # FLD AUTO: <1000 /UL
RBC # UR STRIP.AUTO: ABNORMAL /UL
RBC #/AREA URNS AUTO: >20 /HPF
RETICS #: 0.07 X10*6/UL (ref 0.02–0.11)
RETICS/RBC NFR AUTO: 1.4 % (ref 0.5–2)
RH FACTOR (ANTIGEN D): NORMAL
SALICYLATES SERPL-MCNC: <3 MG/DL
SARS-COV-2 RNA RESP QL NAA+PROBE: NOT DETECTED
SMOOTH MUSCLE AB SER QL IF: NEGATIVE
SODIUM SERPL-SCNC: 126 MMOL/L (ref 136–145)
SODIUM SERPL-SCNC: 128 MMOL/L (ref 136–145)
SODIUM SERPL-SCNC: 128 MMOL/L (ref 136–145)
SODIUM SERPL-SCNC: 132 MMOL/L (ref 136–145)
SODIUM SERPL-SCNC: 132 MMOL/L (ref 136–145)
SODIUM SERPL-SCNC: 134 MMOL/L (ref 136–145)
SODIUM SERPL-SCNC: 134 MMOL/L (ref 136–145)
SP GR UR STRIP.AUTO: 1.04
T AXIS: 23 DEGREES
T OFFSET: 418 MS
T4 FREE SERPL-MCNC: 0.8 NG/DL (ref 0.78–1.48)
T4 FREE SERPL-MCNC: 0.85 NG/DL (ref 0.78–1.48)
TIBC SERPL-MCNC: 289 UG/DL (ref 240–445)
TOTAL CELLS COUNTED FLD: 100
TSH SERPL-ACNC: 13.98 MIU/L (ref 0.44–3.98)
TSH SERPL-ACNC: 6.71 MIU/L (ref 0.44–3.98)
UIBC SERPL-MCNC: 195 UG/DL (ref 110–370)
UROBILINOGEN UR STRIP.AUTO-MCNC: ABNORMAL MG/DL
VENTRICULAR RATE: 84 BPM
WBC # BLD AUTO: 6 X10*3/UL (ref 4.4–11.3)
WBC # BLD AUTO: 6.4 X10*3/UL (ref 4.4–11.3)
WBC # BLD AUTO: 6.6 X10*3/UL (ref 4.4–11.3)
WBC # BLD AUTO: 7.1 X10*3/UL (ref 4.4–11.3)
WBC # BLD AUTO: 7.2 X10*3/UL (ref 4.4–11.3)
WBC # FLD AUTO: 153 /UL
WBC #/AREA URNS AUTO: >50 /HPF

## 2024-01-01 PROCEDURE — 2500000001 HC RX 250 WO HCPCS SELF ADMINISTERED DRUGS (ALT 637 FOR MEDICARE OP)

## 2024-01-01 PROCEDURE — 99285 EMERGENCY DEPT VISIT HI MDM: CPT | Mod: 25

## 2024-01-01 PROCEDURE — 3075F SYST BP GE 130 - 139MM HG: CPT | Performed by: FAMILY MEDICINE

## 2024-01-01 PROCEDURE — 84075 ASSAY ALKALINE PHOSPHATASE: CPT | Performed by: INTERNAL MEDICINE

## 2024-01-01 PROCEDURE — 85610 PROTHROMBIN TIME: CPT

## 2024-01-01 PROCEDURE — 36415 COLL VENOUS BLD VENIPUNCTURE: CPT

## 2024-01-01 PROCEDURE — 80074 ACUTE HEPATITIS PANEL: CPT | Mod: ELYLAB | Performed by: INTERNAL MEDICINE

## 2024-01-01 PROCEDURE — 83615 LACTATE (LD) (LDH) ENZYME: CPT | Mod: STJLAB

## 2024-01-01 PROCEDURE — 3078F DIAST BP <80 MM HG: CPT | Performed by: FAMILY MEDICINE

## 2024-01-01 PROCEDURE — 84075 ASSAY ALKALINE PHOSPHATASE: CPT | Mod: 91

## 2024-01-01 PROCEDURE — 80053 COMPREHEN METABOLIC PANEL: CPT

## 2024-01-01 PROCEDURE — 82105 ALPHA-FETOPROTEIN SERUM: CPT

## 2024-01-01 PROCEDURE — 96417 CHEMO IV INFUS EACH ADDL SEQ: CPT

## 2024-01-01 PROCEDURE — 2500000004 HC RX 250 GENERAL PHARMACY W/ HCPCS (ALT 636 FOR OP/ED): Performed by: INTERNAL MEDICINE

## 2024-01-01 PROCEDURE — 82105 ALPHA-FETOPROTEIN SERUM: CPT | Mod: ELYLAB | Performed by: NURSE PRACTITIONER

## 2024-01-01 PROCEDURE — 99205 OFFICE O/P NEW HI 60 MIN: CPT | Performed by: INTERNAL MEDICINE

## 2024-01-01 PROCEDURE — 83735 ASSAY OF MAGNESIUM: CPT | Performed by: INTERNAL MEDICINE

## 2024-01-01 PROCEDURE — 99232 SBSQ HOSP IP/OBS MODERATE 35: CPT | Performed by: PHYSICIAN ASSISTANT

## 2024-01-01 PROCEDURE — 2500000004 HC RX 250 GENERAL PHARMACY W/ HCPCS (ALT 636 FOR OP/ED): Performed by: PHYSICIAN ASSISTANT

## 2024-01-01 PROCEDURE — 80143 DRUG ASSAY ACETAMINOPHEN: CPT

## 2024-01-01 PROCEDURE — 1100000001 HC PRIVATE ROOM DAILY

## 2024-01-01 PROCEDURE — 96375 TX/PRO/DX INJ NEW DRUG ADDON: CPT

## 2024-01-01 PROCEDURE — 99223 1ST HOSP IP/OBS HIGH 75: CPT

## 2024-01-01 PROCEDURE — 74177 CT ABD & PELVIS W/CONTRAST: CPT

## 2024-01-01 PROCEDURE — 99213 OFFICE O/P EST LOW 20 MIN: CPT | Performed by: FAMILY MEDICINE

## 2024-01-01 PROCEDURE — 1125F AMNT PAIN NOTED PAIN PRSNT: CPT | Performed by: INTERNAL MEDICINE

## 2024-01-01 PROCEDURE — 2500000004 HC RX 250 GENERAL PHARMACY W/ HCPCS (ALT 636 FOR OP/ED): Mod: JZ

## 2024-01-01 PROCEDURE — 0W9G3ZZ DRAINAGE OF PERITONEAL CAVITY, PERCUTANEOUS APPROACH: ICD-10-PCS | Performed by: NURSE PRACTITIONER

## 2024-01-01 PROCEDURE — 87205 SMEAR GRAM STAIN: CPT | Mod: 91,STJLAB

## 2024-01-01 PROCEDURE — P9047 ALBUMIN (HUMAN), 25%, 50ML: HCPCS | Mod: JZ

## 2024-01-01 PROCEDURE — 96413 CHEMO IV INFUSION 1 HR: CPT

## 2024-01-01 PROCEDURE — 71250 CT THORAX DX C-: CPT

## 2024-01-01 PROCEDURE — 81001 URINALYSIS AUTO W/SCOPE: CPT

## 2024-01-01 PROCEDURE — 99232 SBSQ HOSP IP/OBS MODERATE 35: CPT | Performed by: INTERNAL MEDICINE

## 2024-01-01 PROCEDURE — 86140 C-REACTIVE PROTEIN: CPT | Performed by: PHYSICIAN ASSISTANT

## 2024-01-01 PROCEDURE — 1036F TOBACCO NON-USER: CPT | Performed by: PHYSICIAN ASSISTANT

## 2024-01-01 PROCEDURE — 85025 COMPLETE CBC W/AUTO DIFF WBC: CPT

## 2024-01-01 PROCEDURE — 84443 ASSAY THYROID STIM HORMONE: CPT

## 2024-01-01 PROCEDURE — 2500000005 HC RX 250 GENERAL PHARMACY W/O HCPCS: Performed by: NURSE PRACTITIONER

## 2024-01-01 PROCEDURE — 99233 SBSQ HOSP IP/OBS HIGH 50: CPT | Performed by: INTERNAL MEDICINE

## 2024-01-01 PROCEDURE — 85384 FIBRINOGEN ACTIVITY: CPT | Performed by: INTERNAL MEDICINE

## 2024-01-01 PROCEDURE — 1036F TOBACCO NON-USER: CPT | Performed by: FAMILY MEDICINE

## 2024-01-01 PROCEDURE — 3079F DIAST BP 80-89 MM HG: CPT | Performed by: PHYSICIAN ASSISTANT

## 2024-01-01 PROCEDURE — 36415 COLL VENOUS BLD VENIPUNCTURE: CPT | Performed by: INTERNAL MEDICINE

## 2024-01-01 PROCEDURE — 83036 HEMOGLOBIN GLYCOSYLATED A1C: CPT | Mod: STJLAB

## 2024-01-01 PROCEDURE — 93010 ELECTROCARDIOGRAM REPORT: CPT | Performed by: INTERNAL MEDICINE

## 2024-01-01 PROCEDURE — 2500000002 HC RX 250 W HCPCS SELF ADMINISTERED DRUGS (ALT 637 FOR MEDICARE OP, ALT 636 FOR OP/ED): Performed by: INTERNAL MEDICINE

## 2024-01-01 PROCEDURE — 83690 ASSAY OF LIPASE: CPT | Performed by: PHYSICIAN ASSISTANT

## 2024-01-01 PROCEDURE — 1126F AMNT PAIN NOTED NONE PRSNT: CPT | Performed by: INTERNAL MEDICINE

## 2024-01-01 PROCEDURE — 99223 1ST HOSP IP/OBS HIGH 75: CPT | Performed by: INTERNAL MEDICINE

## 2024-01-01 PROCEDURE — 80320 DRUG SCREEN QUANTALCOHOLS: CPT | Mod: 91

## 2024-01-01 PROCEDURE — 3074F SYST BP LT 130 MM HG: CPT | Performed by: PHYSICIAN ASSISTANT

## 2024-01-01 PROCEDURE — 85610 PROTHROMBIN TIME: CPT | Performed by: PHYSICIAN ASSISTANT

## 2024-01-01 PROCEDURE — 96374 THER/PROPH/DIAG INJ IV PUSH: CPT

## 2024-01-01 PROCEDURE — 1159F MED LIST DOCD IN RCRD: CPT | Performed by: FAMILY MEDICINE

## 2024-01-01 PROCEDURE — 87086 URINE CULTURE/COLONY COUNT: CPT | Mod: STJLAB

## 2024-01-01 PROCEDURE — 3074F SYST BP LT 130 MM HG: CPT | Performed by: INTERNAL MEDICINE

## 2024-01-01 PROCEDURE — 83735 ASSAY OF MAGNESIUM: CPT | Performed by: PHYSICIAN ASSISTANT

## 2024-01-01 PROCEDURE — 82042 OTHER SOURCE ALBUMIN QUAN EA: CPT | Mod: STJLAB

## 2024-01-01 PROCEDURE — 83935 ASSAY OF URINE OSMOLALITY: CPT | Mod: STJLAB

## 2024-01-01 PROCEDURE — 83550 IRON BINDING TEST: CPT | Mod: 91

## 2024-01-01 PROCEDURE — 96376 TX/PRO/DX INJ SAME DRUG ADON: CPT

## 2024-01-01 PROCEDURE — 82140 ASSAY OF AMMONIA: CPT | Performed by: PHYSICIAN ASSISTANT

## 2024-01-01 PROCEDURE — A9575 INJ GADOTERATE MEGLUMI 0.1ML: HCPCS | Performed by: INTERNAL MEDICINE

## 2024-01-01 PROCEDURE — 99223 1ST HOSP IP/OBS HIGH 75: CPT | Performed by: NURSE PRACTITIONER

## 2024-01-01 PROCEDURE — 74183 MRI ABD W/O CNTR FLWD CNTR: CPT | Performed by: RADIOLOGY

## 2024-01-01 PROCEDURE — 82248 BILIRUBIN DIRECT: CPT | Performed by: PHYSICIAN ASSISTANT

## 2024-01-01 PROCEDURE — 82248 BILIRUBIN DIRECT: CPT

## 2024-01-01 PROCEDURE — 93005 ELECTROCARDIOGRAM TRACING: CPT

## 2024-01-01 PROCEDURE — 1200000002 HC GENERAL ROOM WITH TELEMETRY DAILY

## 2024-01-01 PROCEDURE — 84439 ASSAY OF FREE THYROXINE: CPT

## 2024-01-01 PROCEDURE — 83540 ASSAY OF IRON: CPT

## 2024-01-01 PROCEDURE — 2500000004 HC RX 250 GENERAL PHARMACY W/ HCPCS (ALT 636 FOR OP/ED)

## 2024-01-01 PROCEDURE — 1160F RVW MEDS BY RX/DR IN RCRD: CPT | Performed by: INTERNAL MEDICINE

## 2024-01-01 PROCEDURE — 1111F DSCHRG MED/CURRENT MED MERGE: CPT | Performed by: INTERNAL MEDICINE

## 2024-01-01 PROCEDURE — 2720000007 HC OR 272 NO HCPCS

## 2024-01-01 PROCEDURE — 85025 COMPLETE CBC W/AUTO DIFF WBC: CPT | Performed by: INTERNAL MEDICINE

## 2024-01-01 PROCEDURE — 83930 ASSAY OF BLOOD OSMOLALITY: CPT | Mod: STJLAB

## 2024-01-01 PROCEDURE — 36415 COLL VENOUS BLD VENIPUNCTURE: CPT | Performed by: PHYSICIAN ASSISTANT

## 2024-01-01 PROCEDURE — 3078F DIAST BP <80 MM HG: CPT | Performed by: INTERNAL MEDICINE

## 2024-01-01 PROCEDURE — 1159F MED LIST DOCD IN RCRD: CPT | Performed by: INTERNAL MEDICINE

## 2024-01-01 PROCEDURE — 99496 TRANSJ CARE MGMT HIGH F2F 7D: CPT | Performed by: PHYSICIAN ASSISTANT

## 2024-01-01 PROCEDURE — 1160F RVW MEDS BY RX/DR IN RCRD: CPT | Performed by: FAMILY MEDICINE

## 2024-01-01 PROCEDURE — 87070 CULTURE OTHR SPECIMN AEROBIC: CPT | Mod: STJLAB

## 2024-01-01 PROCEDURE — 99215 OFFICE O/P EST HI 40 MIN: CPT | Performed by: INTERNAL MEDICINE

## 2024-01-01 PROCEDURE — 1160F RVW MEDS BY RX/DR IN RCRD: CPT | Performed by: PHYSICIAN ASSISTANT

## 2024-01-01 PROCEDURE — 86381 MITOCHONDRIAL ANTIBODY EACH: CPT | Mod: ELYLAB | Performed by: INTERNAL MEDICINE

## 2024-01-01 PROCEDURE — 74177 CT ABD & PELVIS W/CONTRAST: CPT | Performed by: RADIOLOGY

## 2024-01-01 PROCEDURE — 99223 1ST HOSP IP/OBS HIGH 75: CPT | Performed by: PHYSICIAN ASSISTANT

## 2024-01-01 PROCEDURE — 2550000001 HC RX 255 CONTRASTS: Performed by: INTERNAL MEDICINE

## 2024-01-01 PROCEDURE — 2550000001 HC RX 255 CONTRASTS: Performed by: EMERGENCY MEDICINE

## 2024-01-01 PROCEDURE — 86706 HEP B SURFACE ANTIBODY: CPT

## 2024-01-01 PROCEDURE — 99285 EMERGENCY DEPT VISIT HI MDM: CPT | Performed by: EMERGENCY MEDICINE

## 2024-01-01 PROCEDURE — 51702 INSERT TEMP BLADDER CATH: CPT

## 2024-01-01 PROCEDURE — 82105 ALPHA-FETOPROTEIN SERUM: CPT | Mod: ELYLAB | Performed by: INTERNAL MEDICINE

## 2024-01-01 PROCEDURE — 85025 COMPLETE CBC W/AUTO DIFF WBC: CPT | Performed by: PHYSICIAN ASSISTANT

## 2024-01-01 PROCEDURE — 85045 AUTOMATED RETICULOCYTE COUNT: CPT

## 2024-01-01 PROCEDURE — 3074F SYST BP LT 130 MM HG: CPT | Performed by: FAMILY MEDICINE

## 2024-01-01 PROCEDURE — 85652 RBC SED RATE AUTOMATED: CPT | Performed by: PHYSICIAN ASSISTANT

## 2024-01-01 PROCEDURE — 83605 ASSAY OF LACTIC ACID: CPT | Performed by: PHYSICIAN ASSISTANT

## 2024-01-01 PROCEDURE — 86803 HEPATITIS C AB TEST: CPT

## 2024-01-01 PROCEDURE — 87186 SC STD MICRODIL/AGAR DIL: CPT | Mod: STJLAB

## 2024-01-01 PROCEDURE — 99238 HOSP IP/OBS DSCHRG MGMT 30/<: CPT

## 2024-01-01 PROCEDURE — 49083 ABD PARACENTESIS W/IMAGING: CPT | Mod: RT | Performed by: NURSE PRACTITIONER

## 2024-01-01 PROCEDURE — 84157 ASSAY OF PROTEIN OTHER: CPT | Mod: STJLAB

## 2024-01-01 PROCEDURE — 87636 SARSCOV2 & INF A&B AMP PRB: CPT | Performed by: PHYSICIAN ASSISTANT

## 2024-01-01 PROCEDURE — 74183 MRI ABD W/O CNTR FLWD CNTR: CPT

## 2024-01-01 PROCEDURE — 99239 HOSP IP/OBS DSCHRG MGMT >30: CPT | Performed by: INTERNAL MEDICINE

## 2024-01-01 PROCEDURE — 1159F MED LIST DOCD IN RCRD: CPT | Performed by: PHYSICIAN ASSISTANT

## 2024-01-01 PROCEDURE — 89051 BODY FLUID CELL COUNT: CPT

## 2024-01-01 PROCEDURE — 82533 TOTAL CORTISOL: CPT

## 2024-01-01 PROCEDURE — 82374 ASSAY BLOOD CARBON DIOXIDE: CPT | Performed by: INTERNAL MEDICINE

## 2024-01-01 PROCEDURE — 85730 THROMBOPLASTIN TIME PARTIAL: CPT

## 2024-01-01 PROCEDURE — 1111F DSCHRG MED/CURRENT MED MERGE: CPT | Performed by: PHYSICIAN ASSISTANT

## 2024-01-01 PROCEDURE — 96361 HYDRATE IV INFUSION ADD-ON: CPT

## 2024-01-01 PROCEDURE — 86901 BLOOD TYPING SEROLOGIC RH(D): CPT

## 2024-01-01 PROCEDURE — 82374 ASSAY BLOOD CARBON DIOXIDE: CPT | Performed by: PHYSICIAN ASSISTANT

## 2024-01-01 PROCEDURE — 85025 COMPLETE CBC W/AUTO DIFF WBC: CPT | Mod: 91

## 2024-01-01 PROCEDURE — 82728 ASSAY OF FERRITIN: CPT

## 2024-01-01 PROCEDURE — 83605 ASSAY OF LACTIC ACID: CPT

## 2024-01-01 PROCEDURE — 86015 ACTIN ANTIBODY EACH: CPT | Mod: ELYLAB | Performed by: INTERNAL MEDICINE

## 2024-01-01 PROCEDURE — 71250 CT THORAX DX C-: CPT | Performed by: RADIOLOGY

## 2024-01-01 PROCEDURE — 2500000001 HC RX 250 WO HCPCS SELF ADMINISTERED DRUGS (ALT 637 FOR MEDICARE OP): Performed by: INTERNAL MEDICINE

## 2024-01-01 PROCEDURE — 84075 ASSAY ALKALINE PHOSPHATASE: CPT

## 2024-01-01 PROCEDURE — 85027 COMPLETE CBC AUTOMATED: CPT

## 2024-01-01 PROCEDURE — 82024 ASSAY OF ACTH: CPT

## 2024-01-01 PROCEDURE — C9113 INJ PANTOPRAZOLE SODIUM, VIA: HCPCS

## 2024-01-01 RX ORDER — PRAMIPEXOLE DIHYDROCHLORIDE 0.25 MG/1
0.12 TABLET ORAL NIGHTLY
Status: DISCONTINUED | OUTPATIENT
Start: 2024-01-01 | End: 2024-01-01 | Stop reason: HOSPADM

## 2024-01-01 RX ORDER — ACETAMINOPHEN 500 MG
1000 TABLET ORAL EVERY 6 HOURS
COMMUNITY

## 2024-01-01 RX ORDER — DOXYCYCLINE 100 MG/1
100 CAPSULE ORAL 2 TIMES DAILY
Qty: 28 CAPSULE | Refills: 0 | Status: SHIPPED | OUTPATIENT
Start: 2024-01-01 | End: 2024-01-01

## 2024-01-01 RX ORDER — PROCHLORPERAZINE MALEATE 10 MG
10 TABLET ORAL EVERY 6 HOURS PRN
Qty: 30 TABLET | Refills: 5 | Status: SHIPPED
Start: 2024-01-01 | End: 2024-01-01 | Stop reason: HOSPADM

## 2024-01-01 RX ORDER — ALBUTEROL SULFATE 0.83 MG/ML
3 SOLUTION RESPIRATORY (INHALATION) AS NEEDED
Status: CANCELLED | OUTPATIENT
Start: 2024-01-01

## 2024-01-01 RX ORDER — PROCHLORPERAZINE MALEATE 10 MG
10 TABLET ORAL EVERY 6 HOURS PRN
Status: CANCELLED | OUTPATIENT
Start: 2024-01-01

## 2024-01-01 RX ORDER — METOCLOPRAMIDE 10 MG/1
10 TABLET ORAL 4 TIMES DAILY PRN
Qty: 30 TABLET | Refills: 0 | Status: SHIPPED | OUTPATIENT
Start: 2024-01-01 | End: 2024-05-29

## 2024-01-01 RX ORDER — ONDANSETRON HYDROCHLORIDE 2 MG/ML
4 INJECTION, SOLUTION INTRAVENOUS EVERY 4 HOURS
Status: DISCONTINUED | OUTPATIENT
Start: 2024-01-01 | End: 2024-01-01 | Stop reason: HOSPADM

## 2024-01-01 RX ORDER — PANTOPRAZOLE SODIUM 40 MG/10ML
40 INJECTION, POWDER, LYOPHILIZED, FOR SOLUTION INTRAVENOUS 2 TIMES DAILY
Status: DISCONTINUED | OUTPATIENT
Start: 2024-01-01 | End: 2024-01-01

## 2024-01-01 RX ORDER — CEFTRIAXONE 2 G/50ML
2 INJECTION, SOLUTION INTRAVENOUS EVERY 24 HOURS
Status: DISCONTINUED | OUTPATIENT
Start: 2024-01-01 | End: 2024-01-01

## 2024-01-01 RX ORDER — SENNOSIDES 8.6 MG/1
2 TABLET ORAL EVERY 12 HOURS
COMMUNITY
Start: 2024-01-01

## 2024-01-01 RX ORDER — LEVOTHYROXINE SODIUM 50 UG/1
50 TABLET ORAL
Status: DISCONTINUED | OUTPATIENT
Start: 2024-01-01 | End: 2024-01-01 | Stop reason: HOSPADM

## 2024-01-01 RX ORDER — LIDOCAINE HYDROCHLORIDE 10 MG/ML
INJECTION, SOLUTION EPIDURAL; INFILTRATION; INTRACAUDAL; PERINEURAL
Status: COMPLETED | OUTPATIENT
Start: 2024-01-01 | End: 2024-01-01

## 2024-01-01 RX ORDER — FUROSEMIDE 20 MG/1
20 TABLET ORAL DAILY
Status: DISCONTINUED | OUTPATIENT
Start: 2024-01-01 | End: 2024-01-01

## 2024-01-01 RX ORDER — OXYCODONE HYDROCHLORIDE 5 MG/1
5 TABLET ORAL EVERY 6 HOURS PRN
Qty: 90 TABLET | Refills: 0 | Status: SHIPPED
Start: 2024-01-01 | End: 2024-05-29

## 2024-01-01 RX ORDER — FAMOTIDINE 10 MG/ML
20 INJECTION INTRAVENOUS ONCE AS NEEDED
Status: CANCELLED | OUTPATIENT
Start: 2024-01-01

## 2024-01-01 RX ORDER — HEPARIN SODIUM,PORCINE/PF 10 UNIT/ML
50 SYRINGE (ML) INTRAVENOUS AS NEEDED
Status: CANCELLED | OUTPATIENT
Start: 2024-01-01

## 2024-01-01 RX ORDER — MORPHINE SULFATE 4 MG/ML
4 INJECTION, SOLUTION INTRAMUSCULAR; INTRAVENOUS EVERY 4 HOURS PRN
Status: DISCONTINUED | OUTPATIENT
Start: 2024-01-01 | End: 2024-01-01 | Stop reason: HOSPADM

## 2024-01-01 RX ORDER — OXYCODONE HYDROCHLORIDE 5 MG/1
5 TABLET ORAL EVERY 6 HOURS PRN
Qty: 30 TABLET | Refills: 0 | Status: SHIPPED | OUTPATIENT
Start: 2024-01-01 | End: 2024-01-01 | Stop reason: SDUPTHER

## 2024-01-01 RX ORDER — FINASTERIDE 5 MG/1
5 TABLET, FILM COATED ORAL DAILY
Status: DISCONTINUED | OUTPATIENT
Start: 2024-01-01 | End: 2024-01-01 | Stop reason: HOSPADM

## 2024-01-01 RX ORDER — ONDANSETRON HYDROCHLORIDE 2 MG/ML
4 INJECTION, SOLUTION INTRAVENOUS EVERY 8 HOURS PRN
Status: DISCONTINUED | OUTPATIENT
Start: 2024-01-01 | End: 2024-01-01 | Stop reason: HOSPADM

## 2024-01-01 RX ORDER — PROCHLORPERAZINE EDISYLATE 5 MG/ML
10 INJECTION INTRAMUSCULAR; INTRAVENOUS EVERY 6 HOURS PRN
Status: CANCELLED | OUTPATIENT
Start: 2024-01-01

## 2024-01-01 RX ORDER — FAMOTIDINE 10 MG/ML
20 INJECTION INTRAVENOUS ONCE AS NEEDED
Status: DISCONTINUED | OUTPATIENT
Start: 2024-01-01 | End: 2024-01-01 | Stop reason: HOSPADM

## 2024-01-01 RX ORDER — CHOLECALCIFEROL (VITAMIN D3) 25 MCG
2000 TABLET ORAL DAILY
Status: DISCONTINUED | OUTPATIENT
Start: 2024-01-01 | End: 2024-01-01 | Stop reason: HOSPADM

## 2024-01-01 RX ORDER — SPIRONOLACTONE 50 MG/1
50 TABLET, FILM COATED ORAL DAILY
Status: DISCONTINUED | OUTPATIENT
Start: 2024-01-01 | End: 2024-01-01 | Stop reason: HOSPADM

## 2024-01-01 RX ORDER — GADOTERATE MEGLUMINE 376.9 MG/ML
0.2 INJECTION INTRAVENOUS
Status: COMPLETED | OUTPATIENT
Start: 2024-01-01 | End: 2024-01-01

## 2024-01-01 RX ORDER — MORPHINE SULFATE 4 MG/ML
4 INJECTION, SOLUTION INTRAMUSCULAR; INTRAVENOUS ONCE
Status: COMPLETED | OUTPATIENT
Start: 2024-01-01 | End: 2024-01-01

## 2024-01-01 RX ORDER — METOCLOPRAMIDE HYDROCHLORIDE 5 MG/ML
10 INJECTION INTRAMUSCULAR; INTRAVENOUS EVERY 6 HOURS SCHEDULED
Status: DISCONTINUED | OUTPATIENT
Start: 2024-01-01 | End: 2024-01-01 | Stop reason: HOSPADM

## 2024-01-01 RX ORDER — SENNOSIDES 8.6 MG/1
2 TABLET ORAL 2 TIMES DAILY
Status: DISCONTINUED | OUTPATIENT
Start: 2024-01-01 | End: 2024-01-01 | Stop reason: HOSPADM

## 2024-01-01 RX ORDER — ALBUTEROL SULFATE 0.83 MG/ML
3 SOLUTION RESPIRATORY (INHALATION) AS NEEDED
Status: DISCONTINUED | OUTPATIENT
Start: 2024-01-01 | End: 2024-01-01 | Stop reason: HOSPADM

## 2024-01-01 RX ORDER — ROSUVASTATIN CALCIUM 10 MG/1
5 TABLET, COATED ORAL NIGHTLY
Status: DISCONTINUED | OUTPATIENT
Start: 2024-01-01 | End: 2024-01-01 | Stop reason: HOSPADM

## 2024-01-01 RX ORDER — PROCHLORPERAZINE MALEATE 10 MG
10 TABLET ORAL EVERY 6 HOURS PRN
Status: DISCONTINUED | OUTPATIENT
Start: 2024-01-01 | End: 2024-01-01 | Stop reason: HOSPADM

## 2024-01-01 RX ORDER — HEPARIN 100 UNIT/ML
500 SYRINGE INTRAVENOUS AS NEEDED
Status: CANCELLED | OUTPATIENT
Start: 2024-01-01

## 2024-01-01 RX ORDER — ALLOPURINOL 100 MG/1
100 TABLET ORAL DAILY
Status: DISCONTINUED | OUTPATIENT
Start: 2024-01-01 | End: 2024-01-01

## 2024-01-01 RX ORDER — ONDANSETRON HYDROCHLORIDE 2 MG/ML
4 INJECTION, SOLUTION INTRAVENOUS ONCE
Status: COMPLETED | OUTPATIENT
Start: 2024-01-01 | End: 2024-01-01

## 2024-01-01 RX ORDER — DIPHENHYDRAMINE HYDROCHLORIDE 50 MG/ML
50 INJECTION INTRAMUSCULAR; INTRAVENOUS AS NEEDED
Status: CANCELLED | OUTPATIENT
Start: 2024-01-01

## 2024-01-01 RX ORDER — PRAMIPEXOLE DIHYDROCHLORIDE 0.12 MG/1
0.12 TABLET ORAL NIGHTLY
Qty: 90 TABLET | Refills: 3 | Status: SHIPPED
Start: 2024-01-01 | End: 2024-05-29

## 2024-01-01 RX ORDER — ACETAMINOPHEN 325 MG/1
1000 TABLET ORAL EVERY 6 HOURS
Status: DISCONTINUED | OUTPATIENT
Start: 2024-01-01 | End: 2024-01-01 | Stop reason: HOSPADM

## 2024-01-01 RX ORDER — ALBUMIN HUMAN 250 G/1000ML
25 SOLUTION INTRAVENOUS
Qty: 200 ML | Refills: 0 | Status: COMPLETED | OUTPATIENT
Start: 2024-01-01 | End: 2024-01-01

## 2024-01-01 RX ORDER — ASPIRIN 81 MG/1
81 TABLET ORAL DAILY
COMMUNITY
End: 2024-01-01 | Stop reason: HOSPADM

## 2024-01-01 RX ORDER — SODIUM CHLORIDE 9 MG/ML
50 INJECTION, SOLUTION INTRAVENOUS CONTINUOUS
Status: DISCONTINUED | OUTPATIENT
Start: 2024-01-01 | End: 2024-01-01

## 2024-01-01 RX ORDER — CEFTRIAXONE 1 G/50ML
1 INJECTION, SOLUTION INTRAVENOUS EVERY 24 HOURS
Status: DISCONTINUED | OUTPATIENT
Start: 2024-01-01 | End: 2024-01-01

## 2024-01-01 RX ORDER — OXYCODONE HYDROCHLORIDE 5 MG/1
5 TABLET ORAL EVERY 4 HOURS PRN
Status: DISCONTINUED | OUTPATIENT
Start: 2024-01-01 | End: 2024-01-01 | Stop reason: HOSPADM

## 2024-01-01 RX ORDER — ENOXAPARIN SODIUM 100 MG/ML
40 INJECTION SUBCUTANEOUS
COMMUNITY
Start: 2024-01-01 | End: 2024-01-01

## 2024-01-01 RX ORDER — DIPHENHYDRAMINE HYDROCHLORIDE 50 MG/ML
50 INJECTION INTRAMUSCULAR; INTRAVENOUS AS NEEDED
Status: DISCONTINUED | OUTPATIENT
Start: 2024-01-01 | End: 2024-01-01 | Stop reason: HOSPADM

## 2024-01-01 RX ORDER — AMOXICILLIN 250 MG
2 CAPSULE ORAL 2 TIMES DAILY
Status: DISCONTINUED | OUTPATIENT
Start: 2024-01-01 | End: 2024-01-01

## 2024-01-01 RX ORDER — EPINEPHRINE 0.3 MG/.3ML
0.3 INJECTION SUBCUTANEOUS EVERY 5 MIN PRN
Status: CANCELLED | OUTPATIENT
Start: 2024-01-01

## 2024-01-01 RX ORDER — ALLOPURINOL 100 MG/1
100 TABLET ORAL DAILY
Status: DISCONTINUED | OUTPATIENT
Start: 2024-01-01 | End: 2024-01-01 | Stop reason: HOSPADM

## 2024-01-01 RX ORDER — FINASTERIDE 5 MG/1
5 TABLET, FILM COATED ORAL DAILY
Qty: 90 TABLET | Refills: 3 | Status: SHIPPED
Start: 2024-01-01 | End: 2024-05-29

## 2024-01-01 RX ORDER — PROCHLORPERAZINE EDISYLATE 5 MG/ML
10 INJECTION INTRAMUSCULAR; INTRAVENOUS EVERY 6 HOURS PRN
Status: DISCONTINUED | OUTPATIENT
Start: 2024-01-01 | End: 2024-01-01 | Stop reason: HOSPADM

## 2024-01-01 RX ORDER — ROSUVASTATIN CALCIUM 5 MG/1
5 TABLET, COATED ORAL DAILY
Status: DISCONTINUED | OUTPATIENT
Start: 2024-01-01 | End: 2024-01-01 | Stop reason: HOSPADM

## 2024-01-01 RX ORDER — ENOXAPARIN SODIUM 100 MG/ML
40 INJECTION SUBCUTANEOUS EVERY 24 HOURS
Status: DISCONTINUED | OUTPATIENT
Start: 2024-01-01 | End: 2024-01-01 | Stop reason: HOSPADM

## 2024-01-01 RX ORDER — PANTOPRAZOLE SODIUM 40 MG/10ML
80 INJECTION, POWDER, LYOPHILIZED, FOR SOLUTION INTRAVENOUS ONCE
Status: COMPLETED | OUTPATIENT
Start: 2024-01-01 | End: 2024-01-01

## 2024-01-01 RX ORDER — ONDANSETRON HYDROCHLORIDE 8 MG/1
8 TABLET, FILM COATED ORAL EVERY 8 HOURS PRN
Qty: 60 TABLET | Refills: 1 | Status: SHIPPED
Start: 2024-01-01 | End: 2024-05-29

## 2024-01-01 RX ORDER — SENNOSIDES 8.6 MG/1
2 TABLET ORAL EVERY 12 HOURS
Status: DISCONTINUED | OUTPATIENT
Start: 2024-01-01 | End: 2024-01-01

## 2024-01-01 RX ORDER — EPINEPHRINE 0.3 MG/.3ML
0.3 INJECTION SUBCUTANEOUS EVERY 5 MIN PRN
Status: DISCONTINUED | OUTPATIENT
Start: 2024-01-01 | End: 2024-01-01 | Stop reason: HOSPADM

## 2024-01-01 RX ORDER — OXYCODONE HYDROCHLORIDE 5 MG/1
5 TABLET ORAL EVERY 6 HOURS PRN
Status: DISCONTINUED | OUTPATIENT
Start: 2024-01-01 | End: 2024-01-01 | Stop reason: HOSPADM

## 2024-01-01 RX ORDER — ALBUMIN HUMAN 250 G/1000ML
25 SOLUTION INTRAVENOUS ONCE
Status: DISCONTINUED | OUTPATIENT
Start: 2024-01-01 | End: 2024-01-01

## 2024-01-01 RX ADMIN — ROSUVASTATIN CALCIUM 5 MG: 10 TABLET, FILM COATED ORAL at 20:41

## 2024-01-01 RX ADMIN — ENOXAPARIN SODIUM 40 MG: 40 INJECTION SUBCUTANEOUS at 14:36

## 2024-01-01 RX ADMIN — SENNOSIDES 17.2 MG: 8.6 TABLET, FILM COATED ORAL at 09:11

## 2024-01-01 RX ADMIN — IOHEXOL 75 ML: 350 INJECTION, SOLUTION INTRAVENOUS at 07:54

## 2024-01-01 RX ADMIN — ALBUMIN HUMAN 25 G: 0.25 SOLUTION INTRAVENOUS at 15:30

## 2024-01-01 RX ADMIN — PANTOPRAZOLE SODIUM 80 MG: 40 INJECTION, POWDER, FOR SOLUTION INTRAVENOUS at 18:31

## 2024-01-01 RX ADMIN — MORPHINE SULFATE 4 MG: 4 INJECTION, SOLUTION INTRAMUSCULAR; INTRAVENOUS at 01:20

## 2024-01-01 RX ADMIN — ALLOPURINOL 100 MG: 100 TABLET ORAL at 09:11

## 2024-01-01 RX ADMIN — FINASTERIDE 5 MG: 5 TABLET, FILM COATED ORAL at 09:12

## 2024-01-01 RX ADMIN — DEXTROSE MONOHYDRATE 300 MG: 50 INJECTION, SOLUTION INTRAVENOUS at 13:10

## 2024-01-01 RX ADMIN — PANTOPRAZOLE SODIUM 40 MG: 40 INJECTION, POWDER, FOR SOLUTION INTRAVENOUS at 08:56

## 2024-01-01 RX ADMIN — OCTREOTIDE ACETATE 50 MCG/HR: 500 INJECTION, SOLUTION INTRAVENOUS; SUBCUTANEOUS at 23:08

## 2024-01-01 RX ADMIN — LEVOTHYROXINE SODIUM 50 MCG: 50 TABLET ORAL at 06:11

## 2024-01-01 RX ADMIN — LEVOTHYROXINE SODIUM 50 MCG: 50 TABLET ORAL at 05:43

## 2024-01-01 RX ADMIN — SENNOSIDES 17.2 MG: 8.6 TABLET, FILM COATED ORAL at 20:41

## 2024-01-01 RX ADMIN — ALLOPURINOL 100 MG: 100 TABLET ORAL at 08:11

## 2024-01-01 RX ADMIN — SODIUM CHLORIDE 1000 ML: 9 INJECTION, SOLUTION INTRAVENOUS at 06:34

## 2024-01-01 RX ADMIN — Medication 2000 UNITS: at 06:11

## 2024-01-01 RX ADMIN — PRAMIPEXOLE DIHYDROCHLORIDE 0.12 MG: 0.25 TABLET ORAL at 22:32

## 2024-01-01 RX ADMIN — SODIUM CHLORIDE 50 ML/HR: 9 INJECTION, SOLUTION INTRAVENOUS at 14:56

## 2024-01-01 RX ADMIN — ALLOPURINOL 100 MG: 100 TABLET ORAL at 14:56

## 2024-01-01 RX ADMIN — ONDANSETRON 4 MG: 2 INJECTION INTRAMUSCULAR; INTRAVENOUS at 01:23

## 2024-01-01 RX ADMIN — ENOXAPARIN SODIUM 40 MG: 40 INJECTION SUBCUTANEOUS at 14:56

## 2024-01-01 RX ADMIN — FINASTERIDE 5 MG: 5 TABLET, FILM COATED ORAL at 08:12

## 2024-01-01 RX ADMIN — SODIUM CHLORIDE 1000 ML: 9 INJECTION, SOLUTION INTRAVENOUS at 14:37

## 2024-01-01 RX ADMIN — GADOTERATE MEGLUMINE 16.5 ML: 376.9 INJECTION INTRAVENOUS at 15:52

## 2024-01-01 RX ADMIN — SODIUM CHLORIDE 1000 ML: 9 INJECTION, SOLUTION INTRAVENOUS at 18:31

## 2024-01-01 RX ADMIN — LEVOTHYROXINE SODIUM 50 MCG: 50 TABLET ORAL at 06:12

## 2024-01-01 RX ADMIN — MORPHINE SULFATE 4 MG: 4 INJECTION, SOLUTION INTRAMUSCULAR; INTRAVENOUS at 06:35

## 2024-01-01 RX ADMIN — IOHEXOL 75 ML: 300 INJECTION, SOLUTION INTRAVENOUS at 15:05

## 2024-01-01 RX ADMIN — SENNOSIDES 17.2 MG: 8.6 TABLET, FILM COATED ORAL at 21:35

## 2024-01-01 RX ADMIN — LIDOCAINE HYDROCHLORIDE 9 ML: 10 INJECTION, SOLUTION EPIDURAL; INFILTRATION; INTRACAUDAL; PERINEURAL at 09:58

## 2024-01-01 RX ADMIN — MORPHINE SULFATE 4 MG: 4 INJECTION, SOLUTION INTRAMUSCULAR; INTRAVENOUS at 12:14

## 2024-01-01 RX ADMIN — FINASTERIDE 5 MG: 5 TABLET, FILM COATED ORAL at 14:56

## 2024-01-01 RX ADMIN — CEFTRIAXONE SODIUM 1 G: 1 INJECTION, SOLUTION INTRAVENOUS at 22:33

## 2024-01-01 RX ADMIN — SENNOSIDES 17.2 MG: 8.6 TABLET, FILM COATED ORAL at 14:56

## 2024-01-01 RX ADMIN — ONDANSETRON 4 MG: 2 INJECTION INTRAMUSCULAR; INTRAVENOUS at 12:14

## 2024-01-01 RX ADMIN — OXYCODONE HYDROCHLORIDE 5 MG: 5 TABLET ORAL at 00:44

## 2024-01-01 RX ADMIN — FINASTERIDE 5 MG: 5 TABLET, FILM COATED ORAL at 08:55

## 2024-01-01 RX ADMIN — METOCLOPRAMIDE 10 MG: 5 INJECTION, SOLUTION INTRAMUSCULAR; INTRAVENOUS at 22:32

## 2024-01-01 RX ADMIN — ROSUVASTATIN CALCIUM 5 MG: 10 TABLET, FILM COATED ORAL at 21:35

## 2024-01-01 RX ADMIN — SODIUM CHLORIDE 1500 MG: 9 INJECTION, SOLUTION INTRAVENOUS at 15:27

## 2024-01-01 RX ADMIN — ALBUMIN HUMAN 25 G: 0.25 SOLUTION INTRAVENOUS at 13:42

## 2024-01-01 RX ADMIN — OXYCODONE HYDROCHLORIDE 5 MG: 5 TABLET ORAL at 22:35

## 2024-01-01 RX ADMIN — ONDANSETRON 4 MG: 2 INJECTION INTRAMUSCULAR; INTRAVENOUS at 16:03

## 2024-01-01 RX ADMIN — FINASTERIDE 5 MG: 5 TABLET, FILM COATED ORAL at 22:32

## 2024-01-01 RX ADMIN — ONDANSETRON 4 MG: 2 INJECTION INTRAMUSCULAR; INTRAVENOUS at 06:35

## 2024-01-01 RX ADMIN — PRAMIPEXOLE DIHYDROCHLORIDE 0.12 MG: 0.25 TABLET ORAL at 20:46

## 2024-01-01 SDOH — SOCIAL STABILITY: SOCIAL INSECURITY: HAVE YOU HAD ANY THOUGHTS OF HARMING ANYONE ELSE?: NO

## 2024-01-01 SDOH — SOCIAL STABILITY: SOCIAL INSECURITY: WERE YOU ABLE TO COMPLETE ALL THE BEHAVIORAL HEALTH SCREENINGS?: YES

## 2024-01-01 SDOH — SOCIAL STABILITY: SOCIAL INSECURITY: DO YOU FEEL ANYONE HAS EXPLOITED OR TAKEN ADVANTAGE OF YOU FINANCIALLY OR OF YOUR PERSONAL PROPERTY?: NO

## 2024-01-01 SDOH — ECONOMIC STABILITY: HOUSING INSECURITY: IN THE LAST 12 MONTHS, HOW MANY PLACES HAVE YOU LIVED?: 1

## 2024-01-01 SDOH — ECONOMIC STABILITY: INCOME INSECURITY: IN THE LAST 12 MONTHS, WAS THERE A TIME WHEN YOU WERE NOT ABLE TO PAY THE MORTGAGE OR RENT ON TIME?: NO

## 2024-01-01 SDOH — SOCIAL STABILITY: SOCIAL INSECURITY: HAVE YOU HAD THOUGHTS OF HARMING ANYONE ELSE?: YES

## 2024-01-01 SDOH — SOCIAL STABILITY: SOCIAL INSECURITY: DO YOU FEEL UNSAFE GOING BACK TO THE PLACE WHERE YOU ARE LIVING?: NO

## 2024-01-01 SDOH — SOCIAL STABILITY: SOCIAL INSECURITY: HAVE YOU HAD THOUGHTS OF HARMING ANYONE ELSE?: NO

## 2024-01-01 SDOH — SOCIAL STABILITY: SOCIAL INSECURITY: HAS ANYONE EVER THREATENED TO HURT YOUR FAMILY OR YOUR PETS?: NO

## 2024-01-01 SDOH — SOCIAL STABILITY: SOCIAL INSECURITY: ARE YOU OR HAVE YOU BEEN THREATENED OR ABUSED PHYSICALLY, EMOTIONALLY, OR SEXUALLY BY ANYONE?: NO

## 2024-01-01 SDOH — SOCIAL STABILITY: SOCIAL INSECURITY: DOES ANYONE TRY TO KEEP YOU FROM HAVING/CONTACTING OTHER FRIENDS OR DOING THINGS OUTSIDE YOUR HOME?: NO

## 2024-01-01 SDOH — SOCIAL STABILITY: SOCIAL INSECURITY: ABUSE: ADULT

## 2024-01-01 SDOH — SOCIAL STABILITY: SOCIAL INSECURITY: ARE THERE ANY APPARENT SIGNS OF INJURIES/BEHAVIORS THAT COULD BE RELATED TO ABUSE/NEGLECT?: NO

## 2024-01-01 ASSESSMENT — COLUMBIA-SUICIDE SEVERITY RATING SCALE - C-SSRS
6. HAVE YOU EVER DONE ANYTHING, STARTED TO DO ANYTHING, OR PREPARED TO DO ANYTHING TO END YOUR LIFE?: NO
2. HAVE YOU ACTUALLY HAD ANY THOUGHTS OF KILLING YOURSELF?: NO
1. IN THE PAST MONTH, HAVE YOU WISHED YOU WERE DEAD OR WISHED YOU COULD GO TO SLEEP AND NOT WAKE UP?: NO
1. IN THE PAST MONTH, HAVE YOU WISHED YOU WERE DEAD OR WISHED YOU COULD GO TO SLEEP AND NOT WAKE UP?: NO
6. HAVE YOU EVER DONE ANYTHING, STARTED TO DO ANYTHING, OR PREPARED TO DO ANYTHING TO END YOUR LIFE?: NO
2. HAVE YOU ACTUALLY HAD ANY THOUGHTS OF KILLING YOURSELF?: NO

## 2024-01-01 ASSESSMENT — ENCOUNTER SYMPTOMS
VOICE CHANGE: 0
RESPIRATORY NEGATIVE: 1
POLYDIPSIA: 0
ADENOPATHY: 0
ABDOMINAL DISTENTION: 1
WEAKNESS: 1
SORE THROAT: 1
JOINT SWELLING: 0
UNEXPECTED WEIGHT CHANGE: 1
CHEST TIGHTNESS: 0
BACK PAIN: 0
ENDOCRINE NEGATIVE: 1
PSYCHIATRIC NEGATIVE: 1
SINUS PRESSURE: 0
ARTHRALGIAS: 0
SHORTNESS OF BREATH: 0
CARDIOVASCULAR NEGATIVE: 1
CARDIOVASCULAR NEGATIVE: 1
CHILLS: 0
EYES NEGATIVE: 1
NEUROLOGICAL NEGATIVE: 1
FREQUENCY: 0
HEMATOLOGIC/LYMPHATIC NEGATIVE: 1
CONFUSION: 1
EYE DISCHARGE: 0
LIGHT-HEADEDNESS: 0
FACIAL ASYMMETRY: 0
PHOTOPHOBIA: 0
ARTHRALGIAS: 0
FATIGUE: 0
EYE REDNESS: 0
NEUROLOGICAL NEGATIVE: 1
NERVOUS/ANXIOUS: 0
SINUS PRESSURE: 1
RESPIRATORY NEGATIVE: 1
DIZZINESS: 0
COUGH: 1
HEMATURIA: 1
RESPIRATORY NEGATIVE: 1
HEADACHES: 0
FATIGUE: 1
FATIGUE: 1
FEVER: 0
NECK PAIN: 0
DYSURIA: 0
WOUND: 0
APPETITE CHANGE: 1
UNEXPECTED WEIGHT CHANGE: 0
EYES NEGATIVE: 1
SINUS COMPLAINT: 1
ABDOMINAL PAIN: 1
CARDIOVASCULAR NEGATIVE: 1
TROUBLE SWALLOWING: 0
NAUSEA: 0
DYSURIA: 1
MUSCULOSKELETAL NEGATIVE: 1
WHEEZING: 0
HEMATOLOGIC/LYMPHATIC NEGATIVE: 1
SHORTNESS OF BREATH: 0
APPETITE CHANGE: 1
ACTIVITY CHANGE: 0
CONSTIPATION: 0
ENDOCRINE NEGATIVE: 1
APPETITE CHANGE: 1
DIZZINESS: 0
ABDOMINAL PAIN: 1
HEADACHES: 1
HALLUCINATIONS: 0
HEADACHES: 0
FEVER: 0
CHILLS: 0
FLANK PAIN: 0
BLOOD IN STOOL: 0
COUGH: 0
EYE ITCHING: 0
FEVER: 0
SORE THROAT: 0
APPETITE CHANGE: 0
EYES NEGATIVE: 1
CHILLS: 0
BRUISES/BLEEDS EASILY: 0
VOMITING: 0
SHORTNESS OF BREATH: 0
VOMITING: 0
NAUSEA: 0
SLEEP DISTURBANCE: 0
TROUBLE SWALLOWING: 0
JOINT SWELLING: 0
HEMATURIA: 1
MYALGIAS: 0
NECK STIFFNESS: 0
SEIZURES: 0
CHILLS: 0
CHOKING: 0
COUGH: 0
NEUROLOGICAL NEGATIVE: 1
DIAPHORESIS: 0
DIARRHEA: 0
SPEECH DIFFICULTY: 0
ABDOMINAL DISTENTION: 1
EYE PAIN: 0
HEMATURIA: 0
ABDOMINAL DISTENTION: 1
ABDOMINAL DISTENTION: 1
NECK PAIN: 0
MUSCULOSKELETAL NEGATIVE: 1
RHINORRHEA: 0
CONFUSION: 0
SWOLLEN GLANDS: 0
DIAPHORESIS: 0
ABDOMINAL PAIN: 0
WHEEZING: 0
TREMORS: 0
PALPITATIONS: 0
VOMITING: 0
HEMATOLOGIC/LYMPHATIC NEGATIVE: 1
ABDOMINAL DISTENTION: 0
MYALGIAS: 0
ALLERGIC/IMMUNOLOGIC NEGATIVE: 1
HOARSE VOICE: 1
ENDOCRINE NEGATIVE: 1
SORE THROAT: 0
EYE PAIN: 0
FEVER: 0
AGITATION: 0
DYSPHORIC MOOD: 0
NUMBNESS: 0
WEAKNESS: 0
PSYCHIATRIC NEGATIVE: 1
CONSTITUTIONAL NEGATIVE: 1
NAUSEA: 0
NUMBNESS: 0

## 2024-01-01 ASSESSMENT — COGNITIVE AND FUNCTIONAL STATUS - GENERAL
WALKING IN HOSPITAL ROOM: A LITTLE
DAILY ACTIVITIY SCORE: 24
PATIENT BASELINE BEDBOUND: NO
MOVING TO AND FROM BED TO CHAIR: A LITTLE
WALKING IN HOSPITAL ROOM: A LITTLE
TURNING FROM BACK TO SIDE WHILE IN FLAT BAD: A LITTLE
TOILETING: A LITTLE
MOBILITY SCORE: 22
CLIMB 3 TO 5 STEPS WITH RAILING: A LOT
CLIMB 3 TO 5 STEPS WITH RAILING: A LITTLE
CLIMB 3 TO 5 STEPS WITH RAILING: A LITTLE
MOBILITY SCORE: 15
MOBILITY SCORE: 19
HELP NEEDED FOR BATHING: A LITTLE
DRESSING REGULAR UPPER BODY CLOTHING: A LITTLE
HELP NEEDED FOR BATHING: A LITTLE
CLIMB 3 TO 5 STEPS WITH RAILING: A LOT
DRESSING REGULAR LOWER BODY CLOTHING: A LITTLE
WALKING IN HOSPITAL ROOM: A LITTLE
MOVING TO AND FROM BED TO CHAIR: A LITTLE
STANDING UP FROM CHAIR USING ARMS: A LOT
WALKING IN HOSPITAL ROOM: A LOT
MOVING TO AND FROM BED TO CHAIR: A LITTLE
STANDING UP FROM CHAIR USING ARMS: A LITTLE
DAILY ACTIVITIY SCORE: 19
DRESSING REGULAR UPPER BODY CLOTHING: A LITTLE
DAILY ACTIVITIY SCORE: 20
STANDING UP FROM CHAIR USING ARMS: A LITTLE
DAILY ACTIVITIY SCORE: 21
DRESSING REGULAR LOWER BODY CLOTHING: A LITTLE
HELP NEEDED FOR BATHING: A LITTLE
CLIMB 3 TO 5 STEPS WITH RAILING: A LITTLE
PERSONAL GROOMING: A LITTLE
WALKING IN HOSPITAL ROOM: A LITTLE
DAILY ACTIVITIY SCORE: 24
CLIMB 3 TO 5 STEPS WITH RAILING: A LITTLE
STANDING UP FROM CHAIR USING ARMS: A LITTLE
DRESSING REGULAR LOWER BODY CLOTHING: A LITTLE
MOBILITY SCORE: 22
PATIENT BASELINE BEDBOUND: NO
DAILY ACTIVITIY SCORE: 24
PERSONAL GROOMING: A LITTLE
TOILETING: A LITTLE
PERSONAL GROOMING: A LITTLE
DAILY ACTIVITIY SCORE: 20
MOBILITY SCORE: 20
CLIMB 3 TO 5 STEPS WITH RAILING: A LITTLE
TOILETING: A LITTLE
TOILETING: A LITTLE
MOVING TO AND FROM BED TO CHAIR: A LITTLE
WALKING IN HOSPITAL ROOM: A LITTLE
PERSONAL GROOMING: A LITTLE
MOBILITY SCORE: 20
DAILY ACTIVITIY SCORE: 19
MOBILITY SCORE: 23
PERSONAL GROOMING: A LITTLE
WALKING IN HOSPITAL ROOM: A LITTLE
MOVING TO AND FROM BED TO CHAIR: A LITTLE
DAILY ACTIVITIY SCORE: 24
CLIMB 3 TO 5 STEPS WITH RAILING: A LOT
TOILETING: A LITTLE
STANDING UP FROM CHAIR USING ARMS: A LITTLE
MOVING FROM LYING ON BACK TO SITTING ON SIDE OF FLAT BED WITH BEDRAILS: A LITTLE
WALKING IN HOSPITAL ROOM: A LITTLE
MOBILITY SCORE: 19
HELP NEEDED FOR BATHING: A LITTLE
CLIMB 3 TO 5 STEPS WITH RAILING: A LITTLE
MOBILITY SCORE: 22

## 2024-01-01 ASSESSMENT — PAIN SCALES - GENERAL
PAINLEVEL_OUTOF10: 0 - NO PAIN
PAINLEVEL_OUTOF10: 0 - NO PAIN
PAINLEVEL_OUTOF10: 8
PAINLEVEL_OUTOF10: 0 - NO PAIN
PAINLEVEL: 4
PAINLEVEL_OUTOF10: 10 - WORST POSSIBLE PAIN
PAINLEVEL_OUTOF10: 0 - NO PAIN
PAINLEVEL: 0-NO PAIN
PAINLEVEL_OUTOF10: 0 - NO PAIN
PAINLEVEL_OUTOF10: 0 - NO PAIN
PAINLEVEL_OUTOF10: 8
PAINLEVEL_OUTOF10: 5 - MODERATE PAIN
PAINLEVEL_OUTOF10: 0 - NO PAIN
PAINLEVEL_OUTOF10: 0 - NO PAIN
PAINLEVEL: 0-NO PAIN
PAINLEVEL_OUTOF10: 5 - MODERATE PAIN
PAINLEVEL_OUTOF10: 4
PAINLEVEL_OUTOF10: 0 - NO PAIN
PAINLEVEL_OUTOF10: 3
PAINLEVEL_OUTOF10: 8
PAINLEVEL_OUTOF10: 5 - MODERATE PAIN
PAINLEVEL_OUTOF10: 0 - NO PAIN
PAINLEVEL_OUTOF10: 0 - NO PAIN
PAINLEVEL_OUTOF10: 5 - MODERATE PAIN
PAINLEVEL: 0-NO PAIN
PAINLEVEL_OUTOF10: 0 - NO PAIN
PAINLEVEL_OUTOF10: 1

## 2024-01-01 ASSESSMENT — ACTIVITIES OF DAILY LIVING (ADL)
WALKS IN HOME: NEEDS ASSISTANCE
PATIENT'S MEMORY ADEQUATE TO SAFELY COMPLETE DAILY ACTIVITIES?: YES
GROOMING: INDEPENDENT
FEEDING YOURSELF: INDEPENDENT
LACK_OF_TRANSPORTATION: NO
GROOMING: INDEPENDENT
LACK_OF_TRANSPORTATION: PATIENT DECLINED
BATHING: INDEPENDENT
PATIENT'S MEMORY ADEQUATE TO SAFELY COMPLETE DAILY ACTIVITIES?: YES
PATIENT'S MEMORY ADEQUATE TO SAFELY COMPLETE DAILY ACTIVITIES?: YES
ASSISTIVE_DEVICE: DENTURES UPPER;DENTURES LOWER;EYEGLASSES;WALKER
JUDGMENT_ADEQUATE_SAFELY_COMPLETE_DAILY_ACTIVITIES: YES
HEARING - LEFT EAR: FUNCTIONAL
ADEQUATE_TO_COMPLETE_ADL: YES
TOILETING: INDEPENDENT
WALKS IN HOME: INDEPENDENT
GROOMING: INDEPENDENT
HEARING - RIGHT EAR: FUNCTIONAL
BATHING: INDEPENDENT
TOILETING: INDEPENDENT
HEARING - RIGHT EAR: FUNCTIONAL
FEEDING YOURSELF: INDEPENDENT
HEARING - LEFT EAR: FUNCTIONAL
TOILETING: INDEPENDENT
JUDGMENT_ADEQUATE_SAFELY_COMPLETE_DAILY_ACTIVITIES: YES
WALKS IN HOME: INDEPENDENT
BATHING: INDEPENDENT
DRESSING YOURSELF: INDEPENDENT
FEEDING YOURSELF: INDEPENDENT
DRESSING YOURSELF: INDEPENDENT
ASSISTIVE_DEVICE: WALKER
HEARING - RIGHT EAR: FUNCTIONAL
ADEQUATE_TO_COMPLETE_ADL: YES
DRESSING YOURSELF: INDEPENDENT
ADEQUATE_TO_COMPLETE_ADL: YES
JUDGMENT_ADEQUATE_SAFELY_COMPLETE_DAILY_ACTIVITIES: YES
HEARING - LEFT EAR: FUNCTIONAL

## 2024-01-01 ASSESSMENT — LIFESTYLE VARIABLES
EVER HAD A DRINK FIRST THING IN THE MORNING TO STEADY YOUR NERVES TO GET RID OF A HANGOVER: NO
EVER FELT BAD OR GUILTY ABOUT YOUR DRINKING: NO
SKIP TO QUESTIONS 9-10: 1
AUDIT-C TOTAL SCORE: 0
AUDIT-C TOTAL SCORE: 0
TOTAL SCORE: 0
HAVE YOU EVER FELT YOU SHOULD CUT DOWN ON YOUR DRINKING: NO
HOW MANY STANDARD DRINKS CONTAINING ALCOHOL DO YOU HAVE ON A TYPICAL DAY: PATIENT DOES NOT DRINK
HOW OFTEN DO YOU HAVE 6 OR MORE DRINKS ON ONE OCCASION: NEVER
SKIP TO QUESTIONS 9-10: 1
AUDIT-C TOTAL SCORE: 0
AUDIT-C TOTAL SCORE: 0
HAVE PEOPLE ANNOYED YOU BY CRITICIZING YOUR DRINKING: NO
HOW MANY STANDARD DRINKS CONTAINING ALCOHOL DO YOU HAVE ON A TYPICAL DAY: PATIENT DOES NOT DRINK
HOW OFTEN DO YOU HAVE A DRINK CONTAINING ALCOHOL: NEVER
HOW OFTEN DO YOU HAVE A DRINK CONTAINING ALCOHOL: NEVER
HOW OFTEN DO YOU HAVE 6 OR MORE DRINKS ON ONE OCCASION: NEVER

## 2024-01-01 ASSESSMENT — PAIN DESCRIPTION - ORIENTATION
ORIENTATION: LOWER;MID
ORIENTATION: LOWER

## 2024-01-01 ASSESSMENT — PAIN DESCRIPTION - LOCATION
LOCATION: ABDOMEN
LOCATION: ABDOMEN
LOCATION: BACK
LOCATION: ABDOMEN

## 2024-01-01 ASSESSMENT — PATIENT HEALTH QUESTIONNAIRE - PHQ9
SUM OF ALL RESPONSES TO PHQ9 QUESTIONS 1 & 2: 0
2. FEELING DOWN, DEPRESSED OR HOPELESS: NOT AT ALL
SUM OF ALL RESPONSES TO PHQ9 QUESTIONS 1 & 2: 0
1. LITTLE INTEREST OR PLEASURE IN DOING THINGS: NOT AT ALL
2. FEELING DOWN, DEPRESSED OR HOPELESS: NOT AT ALL
1. LITTLE INTEREST OR PLEASURE IN DOING THINGS: NOT AT ALL

## 2024-01-13 NOTE — PROGRESS NOTES
"Subjective   Patient ID: Mu Elise is a 78 y.o. male who presents for Sinusitis (Right ear plugged, nasal congestion and sneezing ).    Sinus Problem  This is a new problem. The current episode started 1 to 4 weeks ago. The problem is unchanged. There has been no fever. The pain is mild. Associated symptoms include congestion, coughing, ear pain, headaches, a hoarse voice, sinus pressure and a sore throat. Pertinent negatives include no diaphoresis, neck pain, shortness of breath, sneezing or swollen glands. Past treatments include nothing. The treatment provided no relief.        Review of Systems   Constitutional:  Negative for diaphoresis.   HENT:  Positive for congestion, ear pain, hoarse voice, sinus pressure and sore throat. Negative for sneezing.    Respiratory:  Positive for cough. Negative for shortness of breath.    Musculoskeletal:  Negative for neck pain.   Neurological:  Positive for headaches.       Objective   /76 (BP Location: Right arm, Patient Position: Sitting, BP Cuff Size: Large adult)   Pulse 67   Temp 35.9 °C (96.6 °F) (Temporal)   Resp 16   Ht 1.778 m (5' 10\")   Wt 77.6 kg (171 lb)   SpO2 96%   BMI 24.54 kg/m²     Physical Exam  Constitutional:       General: He is not in acute distress.     Appearance: Normal appearance. He is not ill-appearing, toxic-appearing or diaphoretic.   HENT:      Head: Normocephalic and atraumatic.      Right Ear: External ear normal. A middle ear effusion is present. Tympanic membrane is not injected, scarred, perforated, erythematous, retracted or bulging.      Left Ear: External ear normal. A middle ear effusion is present. Tympanic membrane is not injected, scarred, perforated, erythematous, retracted or bulging.      Nose: Congestion and rhinorrhea present.      Right Sinus: Maxillary sinus tenderness and frontal sinus tenderness present.      Left Sinus: No maxillary sinus tenderness or frontal sinus tenderness.      Mouth/Throat:      " Pharynx: Uvula midline. Posterior oropharyngeal erythema present. No uvula swelling.      Comments: Clear/colorless postnasal drainage  Cardiovascular:      Rate and Rhythm: Normal rate and regular rhythm.   Pulmonary:      Effort: Pulmonary effort is normal.      Breath sounds: Normal breath sounds.   Abdominal:      Palpations: Abdomen is soft.      Tenderness: There is no abdominal tenderness.   Skin:     General: Skin is warm and dry.      Findings: No rash.   Neurological:      General: No focal deficit present.      Mental Status: He is alert and oriented to person, place, and time.   Psychiatric:         Mood and Affect: Mood normal.         Behavior: Behavior normal.         Assessment/Plan   Diagnoses and all orders for this visit:  Acute non-recurrent sinusitis, unspecified location  -     doxycycline (Vibramycin) 100 mg capsule; Take 1 capsule (100 mg) by mouth 2 times a day for 14 days. Take with at least 8 ounces (large glass) of water, do not lie down for 30 minutes after    Also recommend OTC sinus rinse 2-4 times daily until symptoms resolve.

## 2024-03-07 PROBLEM — C22.9 MALIGNANT NEOPLASM OF LIVER (MULTI): Status: ACTIVE | Noted: 2024-01-01

## 2024-03-11 PROBLEM — C22.9 MALIGNANT NEOPLASM OF LIVER (MULTI): Status: RESOLVED | Noted: 2024-01-01 | Resolved: 2024-01-01

## 2024-03-11 NOTE — PROGRESS NOTES
Subjective   Patient ID: Mu Elise is a 78 y.o. male who presents for Restless Legs (Pt. States when he lay in bed at night he can not sleep because his legs move around a lot x 1.5 years), nausea and vomiting (While he was on Doxycycline in January, it has subsided some but still happens ), and Medication Problem (Pt. States he ran out of Finasteride given by Dr. Rainey. He is no longer at his practice. Pt. Is wondering does he still need to be on this medication   ).    Patient comes into the office today complaining of restless leg issues is causing problems with difficulty sleeping.  Has attempted OTC homeopathic remedies with slight improvement but still unable to sleep.  Denies any issues with nausea, vomiting, diarrhea, fever, chills, shortness of breath or chest pain.  He denies any numbness, tingling or weakness.  He denies any gait dysfunction.  He does need a refill on his medications for BPH.         Review of Systems   Constitutional:  Negative for activity change, appetite change, chills, diaphoresis, fatigue, fever and unexpected weight change.   HENT:  Negative for congestion, ear pain, hearing loss, nosebleeds, postnasal drip, rhinorrhea, sinus pressure, sneezing, sore throat, tinnitus, trouble swallowing and voice change.    Eyes:  Negative for photophobia, pain, discharge, redness, itching and visual disturbance.   Respiratory:  Negative for cough, choking, chest tightness, shortness of breath and wheezing.    Cardiovascular:  Negative for chest pain, palpitations and leg swelling.   Gastrointestinal:  Negative for abdominal distention, abdominal pain, blood in stool, constipation, diarrhea, nausea and vomiting.   Endocrine: Negative for cold intolerance, heat intolerance, polydipsia and polyuria.   Genitourinary:  Negative for dysuria, flank pain, frequency, hematuria and urgency.   Musculoskeletal:  Negative for arthralgias, back pain, joint swelling, myalgias, neck pain and neck stiffness.  "  Skin:  Negative for rash and wound.   Allergic/Immunologic: Negative for immunocompromised state.   Neurological:  Negative for dizziness, tremors, seizures, syncope, facial asymmetry, speech difficulty, weakness, light-headedness, numbness and headaches.   Hematological:  Negative for adenopathy. Does not bruise/bleed easily.   Psychiatric/Behavioral:  Negative for agitation, behavioral problems, confusion, dysphoric mood, hallucinations, self-injury, sleep disturbance and suicidal ideas. The patient is not nervous/anxious.        Objective   /69 (BP Location: Right arm, Patient Position: Sitting, BP Cuff Size: Large adult)   Pulse 65   Temp 36.3 °C (97.3 °F) (Temporal)   Resp 18   Ht 1.778 m (5' 10\")   Wt 76.2 kg (168 lb)   SpO2 98%   BMI 24.11 kg/m²     Physical Exam  Constitutional:       General: He is not in acute distress.     Appearance: He is not ill-appearing or diaphoretic.   HENT:      Head: Normocephalic and atraumatic.      Right Ear: External ear normal.      Left Ear: External ear normal.      Nose: Nose normal. No rhinorrhea.   Eyes:      General: Lids are normal. No scleral icterus.        Right eye: No discharge.         Left eye: No discharge.      Conjunctiva/sclera: Conjunctivae normal.   Cardiovascular:      Rate and Rhythm: Normal rate and regular rhythm.      Pulses: Normal pulses.      Heart sounds: No murmur heard.  Pulmonary:      Effort: Pulmonary effort is normal. No respiratory distress.      Breath sounds: No decreased breath sounds, wheezing, rhonchi or rales.   Abdominal:      General: Bowel sounds are normal. There is no distension.      Palpations: Abdomen is soft. There is no mass.      Tenderness: There is no abdominal tenderness. There is no guarding or rebound.   Musculoskeletal:         General: No swelling, tenderness or deformity.      Cervical back: No rigidity or tenderness.      Right lower leg: No edema.      Left lower leg: No edema.   Lymphadenopathy: "      Cervical: No cervical adenopathy.      Upper Body:      Right upper body: No supraclavicular adenopathy.      Left upper body: No supraclavicular adenopathy.   Skin:     General: Skin is warm and dry.      Coloration: Skin is not jaundiced or pale.      Findings: No erythema, lesion or rash.   Neurological:      General: No focal deficit present.      Mental Status: He is alert and oriented to person, place, and time.      Sensory: No sensory deficit.      Motor: No weakness or tremor.      Coordination: Coordination normal.      Gait: Gait normal.   Psychiatric:         Mood and Affect: Mood normal. Affect is not inappropriate.         Behavior: Behavior normal.         Assessment/Plan   Diagnoses and all orders for this visit:  RLS (restless legs syndrome)  -     pramipexole (Mirapex) 0.125 mg tablet; Take 1 tablet (0.125 mg) by mouth once daily at bedtime.  Encounter for hepatitis C screening test for low risk patient  -     Hepatitis C antibody; Future  Benign prostatic hyperplasia with urinary obstruction  -     finasteride (Proscar) 5 mg tablet; Take 1 tablet (5 mg) by mouth once daily.  Type 2 diabetes mellitus with microalbuminuria, with long-term current use of insulin (CMS/HCC)

## 2024-03-22 PROBLEM — K76.9 LIVER LESION: Status: ACTIVE | Noted: 2024-01-01

## 2024-03-22 NOTE — ED PROVIDER NOTES
HPI   Chief Complaint   Patient presents with    Abdominal Pain    Vomiting     Pt bib ambulance for abdominal pain since yesterday and n/v this morning       A 78-year-old male patient comes in the emergency department today with complaints of abdominal pain this been ongoing the last 2 weeks.  She is gradually increased in severity over this period of time.  States he has been having nausea, vomiting, diarrhea.  States he has had no appetite for the last 2 to 3 days which he has not eaten anything.  Currently rates pain a 5 out of 10 on the pain scale states is calm down little bit but states it was much worse.  Denies any associated fevers over this period of time.  He otherwise has no other complaints.                          No data recorded                   Patient History   Past Medical History:   Diagnosis Date    Personal history of malignant neoplasm of liver 04/15/2021    History of liver cancer    Personal history of other diseases of the musculoskeletal system and connective tissue 07/31/2019    History of gout     Past Surgical History:   Procedure Laterality Date    PROSTATE SURGERY       Family History   Problem Relation Name Age of Onset    Other (family history of hepatic cirrhosis) Mother      Other (Family history of liver cancer) Mother      Other (Family history of lung disease) Father      Other (Family history of neoplasm of brain) Sister      Other (Family history of malignant neoplasm of prostate) Brother       Social History     Tobacco Use    Smoking status: Former     Types: Cigarettes    Smokeless tobacco: Never   Vaping Use    Vaping Use: Never used   Substance Use Topics    Alcohol use: Not Currently    Drug use: Never       Physical Exam   ED Triage Vitals [03/22/24 0617]   Temperature Heart Rate Respirations BP   36.2 °C (97.2 °F) 70 18 160/77      Pulse Ox Temp Source Heart Rate Source Patient Position   100 % Temporal -- --      BP Location FiO2 (%)     -- --       Physical  Exam  Constitutional:       General: He is in acute distress (Moderate).      Appearance: Normal appearance. He is ill-appearing. He is not diaphoretic.   HENT:      Head: Normocephalic and atraumatic.      Nose: Nose normal.   Eyes:      Extraocular Movements: Extraocular movements intact.      Conjunctiva/sclera: Conjunctivae normal.      Pupils: Pupils are equal, round, and reactive to light.   Cardiovascular:      Rate and Rhythm: Normal rate and regular rhythm.   Pulmonary:      Effort: Pulmonary effort is normal. No respiratory distress.      Breath sounds: Normal breath sounds. No stridor. No wheezing.   Abdominal:      General: Bowel sounds are absent. There is distension.      Palpations: Abdomen is rigid.      Tenderness: There is generalized abdominal tenderness. There is guarding and rebound.   Musculoskeletal:         General: Normal range of motion.      Cervical back: Normal range of motion.   Skin:     General: Skin is warm and dry.   Neurological:      General: No focal deficit present.      Mental Status: He is alert and oriented to person, place, and time. Mental status is at baseline.   Psychiatric:         Mood and Affect: Mood normal.         ED Course & MDM   Diagnoses as of 03/22/24 1234   Liver lesion   Portal vein thrombosis   Nausea and vomiting, unspecified vomiting type       Medical Decision Making  A 78-year-old male patient comes in the emergency department today with complaints of abdominal pain this been ongoing the last 2 weeks.  She is gradually increased in severity over this period of time.  States he has been having nausea, vomiting, diarrhea.  States he has had no appetite for the last 2 to 3 days which he has not eaten anything.  Currently rates pain a 5 out of 10 on the pain scale states is calm down little bit but states it was much worse.  Denies any associated fevers over this period of time.  He otherwise has no other complaints.    Laboratory studies ordered as well as  CT study of the abdomen pelvis to rule out any acute intra-abdominal surgical need.  Rule out leukocytosis, left shift, acute kidney injury, electrolyte abnormalities.  COVID-19 or influenza.  Rule out UTI.  Rule out significant ascites, SBP, bowel perforation, bowel obstruction, bowel abscess.    Patient negative for COVID-19 and influenza.  Lipase negative.  Patient's albumin 3.3 total bilirubin 2 direct bilirubin 1 alk phos 386 AST 40.  Patient's CRP 7.7.  Metabolic panel shows sodium 132 bicarb 18 anion gap 22 creatinine 1.39 GFR 52.  Patient's sed rate is 80.  Patient has no leukocytosis or left shift.  CT of the abdomen pelvis is consistent with cirrhosis and portal hypertension as well as a thrombosis in the main portal vein extending into the right portal vein branch.  Patient also was found to have a multifocal infiltrating neoplasm or metastatic lesion in the liver.    Scusset his findings with the patient.  Patient states he has no history of liver cancer but his mom  from it.  He describes that he did have something noticed on the CAT scan about 3 months ago but his doctor did not think much of it and he has not seen any sort of specialist or otherwise.    Historian is the patient    Diagnosis: Liver lesion, elevated liver enzymes, nausea and vomiting, portal vein thrombus    I spoke to Amanda with vascular and she will speak to Dr. Devine.    Patient did not get any vascular intervention at this time.  Then spoke to Dr. Pete the hospitalist would like me to reach out to hematology oncology at [facility].  I then spoke to Dr. Chavez with heme-onc.  He is willing to accept the patient but patient can stay at [facility] if patient would prefer.  Recommending getting MRI of the liver and if possible biopsy of the area of concern.  Patient then can follow-up as an outpatient.    I then spoke to Dr. Pete again agrees admit patient under service          Labs Reviewed   CBC WITH AUTO  DIFFERENTIAL - Abnormal       Result Value    WBC 7.2      nRBC 0.0      RBC 4.63      Hemoglobin 13.3 (*)     Hematocrit 38.8 (*)     MCV 84      MCH 28.7      MCHC 34.3      RDW 15.5 (*)     Platelets 304      Neutrophils % 81.9      Immature Granulocytes %, Automated 0.7      Lymphocytes % 8.6      Monocytes % 7.7      Eosinophils % 0.7      Basophils % 0.4      Neutrophils Absolute 5.92 (*)     Immature Granulocytes Absolute, Automated 0.05      Lymphocytes Absolute 0.62 (*)     Monocytes Absolute 0.56      Eosinophils Absolute 0.05      Basophils Absolute 0.03     LACTATE - Abnormal    Lactate 2.8 (*)     Narrative:     Venipuncture immediately after or during the administration of Metamizole may lead to falsely low results. Testing should be performed immediately  prior to Metamizole dosing.   C-REACTIVE PROTEIN - Abnormal    C-Reactive Protein 7.70 (*)    SEDIMENTATION RATE, AUTOMATED - Abnormal    Sedimentation Rate 80 (*)    BASIC METABOLIC PANEL - Abnormal    Glucose 136 (*)     Sodium 132 (*)     Potassium 4.5      Chloride 97 (*)     Bicarbonate 18 (*)     Anion Gap 22 (*)     Urea Nitrogen 56 (*)     Creatinine 1.39 (*)     eGFR 52 (*)     Calcium 9.7     HEPATIC FUNCTION PANEL - Abnormal    Albumin 3.3 (*)     Bilirubin, Total 2.0 (*)     Bilirubin, Direct 1.0 (*)     Alkaline Phosphatase 386 (*)     ALT 19      AST 40 (*)     Total Protein 7.9     PROTIME-INR - Abnormal    Protime 16.1 (*)     INR 1.4 (*)    LIPASE - Normal    Lipase 40      Narrative:     Venipuncture immediately after or during the administration of Metamizole may lead to falsely low results. Testing should be performed immediately prior to Metamizole dosing.   MAGNESIUM - Normal    Magnesium 2.32     AMMONIA - Normal    Ammonia 23     SARS-COV-2 PCR - Normal    Coronavirus 2019, PCR Not Detected      Narrative:     This assay has received FDA Emergency Use Authorization (EUA) and is only authorized for the duration of time that  circumstances exist to justify the authorization of the emergency use of in vitro diagnostic tests for the detection of SARS-CoV-2 virus and/or diagnosis of COVID-19 infection under section 564(b)(1) of the Act, 21 U.S.C. 360bbb-3(b)(1). This assay is an in vitro diagnostic nucleic acid amplification test for the qualitative detection of SARS-CoV-2 from nasopharyngeal specimens and has been validated for use at [facility]. Negative results do not preclude COVID-19 infections and should not be used as the sole basis for diagnosis, treatment, or other management decisions.     INFLUENZA A AND B PCR - Normal    Flu A Result Not Detected      Flu B Result Not Detected      Narrative:     This assay is an in vitro diagnostic multiplex nucleic acid amplification test for the detection and discrimination of Influenza A & B from nasopharyngeal specimens, and has been validated for use at [facility]. Negative results do not preclude Influenza A/B infections, and should not be used as the sole basis for diagnosis, treatment, or other management decisions. If Influenza A/B and RSV PCR results are negative, testing for Parainfluenza virus, Adenovirus and Metapneumovirus is routinely performed for [facility]ology and intensive care inpatients, and is available on other patients by placing an add-on request.   LACTATE - Normal    Lactate 2.0      Narrative:     Venipuncture immediately after or during the administration of Metamizole may lead to falsely low results. Testing should be performed immediately  prior to Metamizole dosing.        CT abdomen pelvis w IV contrast   Final Result   1.  Cirrhosis and portal hypertension to include some increase in   size of the spleen, small volume of ascites, esophageal and gastric   varices, thrombosis in the main portal vein extending into the right   portal vein branch, and cavernous transformation resulting in   reconstitution of  the left branch of the portal vein. See discussion   above.   2. The prostate is somewhat enlarged and the bladder is somewhat   distended and mildly thick-walled.   3. Multifocal infiltrating neoplasm or metastatic lesions in the   liver.             MACRO:   None        Signed by: Joseph Schoenberger 3/22/2024 8:07 AM   Dictation workstation:   [id number]              Shared GUERDA Attestation:    This patient was seen by the advanced practice provider.  I personally saw the patient and made/approved the management plan and take responsibility for the patient management.    History: 78-year-old male presents with abdominal pain.    Exam: Regular rate and rhythm cardiac exam with clear breath sounds bilaterally.  Abdomen is distended and tender to palpation throughout.  Neurological exam is grossly intact.    MDM: Bowel obstruction, ascites    I have seen and examined the patient, agree with the workup, evaluation, medical decision making, management and diagnosis.  The care plan has been discussed.    Jonathon Rodas MD        Procedure  Procedures     Blake Carrera PA-C  03/22/24 9850

## 2024-03-22 NOTE — H&P
Medical Group History and Physical    ASSESSMENT/PLAN:     Abdominal pain  Cirrhosis  Suspected liver malignancy  Portal vein thrombosis  -2-3 weeks of progressive flank and abd pain/distension, with associated n/v  -mom passed from liver cancer  -abd pain related to ongoing liver process, likely malignancy  -portal vein thrombosis with distal reconstitution; would not anticoagulate at this time given high risk varices at least until surveillance EGD able to be done; cont with DVT Ppx  -MRI abd  -GI consultation; will need surveillance EGD at some point given varices on CT  -no ongoing evidence of bleeding, encephalopathy; INR mildly elevated 1.4  -d/w GI, he was actually diagnosed with cirrhosis and had variceal banding back in 2017but has been lost to follow up since  -not much ascites on CT, will consider re-imaging to consider paracentesis if he remains through the weekend  -will also consider IR guided biopsy while here  -will need close outpatient onc follow up      NUNO vs CKD  -Cr mildly elevated over baseline around 1, 1.39 today  -will give some gentle IVF as he has had poor intake and follow    BPH  -cont finasteride    VTE Prophylaxis: Lovenox      HISTORY OF PRESENT ILLNESS:   Chief Complaint: Vomiting    History Of Present Illness:    Mu Elise is a 78 y.o. male with a significant past medical history of BPH, restless leg syndrome, hypothyroidism, hyperlipidemia, hypertension, who is presenting to the emergency department with 2 to 3 weeks of progressive abdominal distention and pain with associated nausea and vomiting.  Patient's family reports that he has not eaten much of anything in several weeks and has been losing weight.  He has not been able to keep anything down due to vomiting.  He finally came in today because the pain became unbearable.  Denies any fevers or chills.  No chest pain or shortness of breath.  Denies any cough.  No dysuria or frequency.  No neurologic complaints.  He denies  any current alcohol use but says he did drink when he was younger.  His mother passed from liver cancer.  Otherwise he is not sure if he has been diagnosed with any kind of liver disease previously.  Denies any confusion.       Review of systems: 10 point review of systems is otherwise negative except as mentioned above.    PAST HISTORIES:       Past Medical History:  He has a past medical history of Personal history of malignant neoplasm of liver (04/15/2021) and Personal history of other diseases of the musculoskeletal system and connective tissue (07/31/2019).    Past Surgical History:  He has a past surgical history that includes Prostate surgery.      Social History:  He reports that he has quit smoking. His smoking use included cigarettes. He has never used smokeless tobacco. He reports that he does not currently use alcohol. He reports that he does not use drugs.    Family History:  Family History   Problem Relation Name Age of Onset    Other (family history of hepatic cirrhosis) Mother      Other (Family history of liver cancer) Mother      Other (Family history of lung disease) Father      Other (Family history of neoplasm of brain) Sister      Other (Family history of malignant neoplasm of prostate) Brother          Allergies:  Lisinopril      OBJECTIVE:       Last Recorded Vitals:  Vitals:    03/22/24 1215 03/22/24 1230 03/22/24 1245 03/22/24 1414   BP:  125/58  150/70   BP Location:       Patient Position:       Pulse: 74 70 68 82   Resp:       Temp:    36.7 °C (98.1 °F)   TempSrc:       SpO2: 97% 96% (!) 93% 96%   Weight:       Height:           Last I/O:  No intake/output data recorded.    Physical Exam  Vitals reviewed.   Constitutional:       Comments: frail   HENT:      Head: Normocephalic and atraumatic.      Mouth/Throat:      Mouth: Mucous membranes are moist.      Pharynx: Oropharynx is clear.   Eyes:      Extraocular Movements: Extraocular movements intact.      Conjunctiva/sclera: Conjunctivae  normal.      Pupils: Pupils are equal, round, and reactive to light.   Cardiovascular:      Rate and Rhythm: Regular rhythm.      Heart sounds: No murmur heard.     No gallop.   Pulmonary:      Effort: Pulmonary effort is normal. No respiratory distress.      Breath sounds: Normal breath sounds. No wheezing, rhonchi or rales.   Abdominal:      General: Abdomen is flat. Bowel sounds are normal. There is distension.      Palpations: Abdomen is soft.      Tenderness: There is abdominal tenderness (diffuse). There is no guarding or rebound.   Musculoskeletal:         General: Normal range of motion.   Skin:     General: Skin is warm and dry.   Neurological:      General: No focal deficit present.      Mental Status: He is alert and oriented to person, place, and time.      Cranial Nerves: No cranial nerve deficit.      Sensory: No sensory deficit.      Motor: No weakness.   Psychiatric:         Mood and Affect: Mood normal.         Behavior: Behavior normal.           Inpatient Medications:  allopurinol, 100 mg, oral, Daily  enoxaparin, 40 mg, subcutaneous, q24h  finasteride, 5 mg, oral, Daily  [START ON 3/23/2024] levothyroxine, 50 mcg, oral, Daily before breakfast  rosuvastatin, 5 mg, oral, Nightly  sennosides, 2 tablet, oral, BID      PRN medications: morphine, ondansetron, oxyCODONE    Outpatient Medications:  Prior to Admission medications    Medication Sig Start Date End Date Taking? Authorizing Provider   acetaminophen (Tylenol) 500 mg tablet Take 2 tablets (1,000 mg) by mouth every 6 hours.    Historical Provider, MD   allopurinol (Zyloprim) 100 mg tablet Take 1 tablet (100 mg) by mouth once daily. 10/12/23 10/11/24  Geovani Griggs,    aspirin 81 mg EC tablet Take 1 tablet (81 mg) by mouth once daily.    Historical Provider, MD   cholecalciferol (Vitamin D-3) 50 mcg (2,000 unit) capsule Take 1 capsule (50 mcg) by mouth early in the morning..    Historical Provider, MD   empagliflozin (Jardiance) 10 mg Take 1  tablet (10 mg) by mouth once daily.  Patient taking differently: Take 1 tablet (10 mg) by mouth 3 times a week. Monday, Wednesday & Friday 10/12/23 10/11/24  Geovani Griggs DO   finasteride (Proscar) 5 mg tablet Take 1 tablet (5 mg) by mouth once daily. 3/11/24 3/11/25  Geovani Griggs DO   furosemide (Lasix) 20 mg tablet Take 1 tablet (20 mg) by mouth once daily. 10/12/23 10/11/24  Geovani Griggs DO   levothyroxine (Synthroid, Levoxyl) 50 mcg tablet Take 1 tablet (50 mcg) by mouth once daily in the morning. Take before meals. 10/12/23   Geovani Griggs DO   metFORMIN  mg 24 hr tablet Take 1 tablet (500 mg) by mouth once daily with a meal. 10/12/23   Geovani Griggs DO   pramipexole (Mirapex) 0.125 mg tablet Take 1 tablet (0.125 mg) by mouth once daily at bedtime. 3/11/24 3/11/25  Geovani Griggs DO   rosuvastatin (Crestor) 5 mg tablet Take 1 tablet (5 mg) by mouth once daily. 10/12/23 10/11/24  Geovani Griggs DO   spironolactone (Aldactone) 50 mg tablet Take 1 tablet (50 mg) by mouth once daily. 10/12/23   Geovani Griggs DO       LABS AND IMAGING:     Last Labs:  CBC - 3/22/2024:  6:22 AM  7.2 13.3 304    38.8      CMP - 3/22/2024:  6:22 AM  9.7 7.9 40 --- 2.0   _ 3.3 19 386      PTT - No results in last year.  1.4   16.1 _     Hemoglobin A1C   Date/Time Value Ref Range Status   10/05/2023 08:57 AM 6.7 (H) see below % Final   04/06/2023 08:09 AM 7.2 (A) % Final     Comment:          Diagnosis of Diabetes-Adults   Non-Diabetic: < or = 5.6%   Increased risk for developing diabetes: 5.7-6.4%   Diagnostic of diabetes: > or = 6.5%  .       Monitoring of Diabetes                Age (y)     Therapeutic Goal (%)   Adults:          >18           <7.0   Pediatrics:    13-18           <7.5                   7-12           <8.0                   0- 6            7.5-8.5   American Diabetes Association. Diabetes Care 33(S1), Jan 2010.     10/06/2022 08:25 AM 5.9 (A) % Final     Comment:           Diagnosis of Diabetes-Adults   Non-Diabetic: < or = 5.6%   Increased risk for developing diabetes: 5.7-6.4%   Diagnostic of diabetes: > or = 6.5%  .       Monitoring of Diabetes                Age (y)     Therapeutic Goal (%)   Adults:          >18           <7.0   Pediatrics:    13-18           <7.5                   7-12           <8.0                   0- 6            7.5-8.5   American Diabetes Association. Diabetes Care 33(S1), Jan 2010.       LDL Calculated   Date/Time Value Ref Range Status   10/05/2023 08:57 AM 58 (L) 140 - 190 mg/dL Final     Comment:                                 Near   Borderline      AGE      Desirable  Optimal    High     High     Very High     0-19 Y     0 - 109     ---    110-129   >/= 130     ----    20-24 Y     0 - 119     ---    120-159   >/= 160     ----      >24 Y     0 -  99   100-129  130-159   160-189     >/=190       VLDL   Date/Time Value Ref Range Status   10/05/2023 08:57 AM 18 0 - 40 mg/dL Final   04/06/2023 08:09 AM 20 0 - 40 mg/dL Final   10/06/2022 08:25 AM 23 0 - 40 mg/dL Final   04/08/2022 08:23 AM 21 0 - 40 mg/dL Final

## 2024-03-22 NOTE — CARE PLAN
The patient's goals for the shift include      Problem: Nutrition  Goal: Oral intake greater than 50%  Outcome: Progressing     Problem: Skin  Goal: Participates in plan/prevention/treatment measures  Flowsheets (Taken 3/22/2024 4971)  Participates in plan/prevention/treatment measures:   Discuss with provider PT/OT consult   Increase activity/out of bed for meals

## 2024-03-22 NOTE — CONSULTS
Department of Internal Medicine  Gastroenterology  Consult note    IMPRESSION/RECOMMENDATIONS  Mu Elise is a 78 y.o. male with a PMH significant of T2DM, BPH, restless leg syndrome, hypothyroidism, hyperlipidemia, hypertension, and known liver cirrhosis 2/2 ETOH with EV lost to follow up who is presenting to the emergency department with 2 to 3 weeks of progressive abdominal distention and and abdominal pain with associated nausea and vomiting.  Patient's family reports that he has not eaten much of anything in several weeks and has been losing weight.  He has not been able to keep anything down due to vomiting.  He finally came in today because the pain became unbearable.  GI has been consulted for cirrhosis and new liver lesion.  No evidence of hepatic encephalopathy, overt GI bleeding, or lower leg edema.  There is abdominal distention and tenderness; however no fluid wave.    Liver cirrhosis 2/2 alcohol with esophageal and gastric varices  Now with new liver lesion on CT and ultrasound concerning for HCC  Elevated alkaline phosphatase, hyperbilirubinemia 2/2 cirrhosis  Portal hypertension, portal vein thrombosis  MELD Na score  20    MRI liver has been ordered  Check alpha-fetoprotein   Medicine team do risk assessment to determine if anticoagulation is suitable for this patient. Consider consult with hematology.   Low-salt diet  Continue Lasix 20 mg po once daily and Spironolactone 50 mg po once daily.   Trend CBC, CMP and INR  Screen for esophageal varices with EGD,  Timing to be determined.  Could be done outpatient.  Monitor mental status for hepatic encephalopathy  Monitor for increased lower leg edema, ascites, increased abdominal girth  Monitor for signs of overt GI bleeding  Avoid hepatotoxic medications   Avoid NSAIDs  Optimize Diabetes management  Maintain adequate protein 1-1.5 g/kilogram/day    Discussed with Dr. Vitale and Dr. Pete     Reason for Consult: new liver lesion    History of  Present Illness      Mu Elise is a 78 y.o. male with a PMH significant of T2DM, BPH, restless leg syndrome, hypothyroidism, hyperlipidemia, hypertension, and known liver cirrhosis 2/2 ETOH with EV lost to follow up who is presenting to the emergency department with 2 to 3 weeks of progressive abdominal distention and pain with associated nausea and vomiting.  Patient's family reports that he has not eaten much of anything in several weeks and has been losing weight.  He has not been able to keep anything down due to vomiting.  He finally came in today because the pain became unbearable.  GI has been consulted for cirrhosis and new liver lesion.      Patient is alert and oriented, no sign of hepatic encephalopathy.  No overt GI bleeding.  Patient reports progressive abdominal distention with abdominal pain associated with some nausea and vomiting with decreased appetite and weight loss.  Denies fever, chills, chest pain or shortness of breath.  No hematemesis, melena, hematochezia.  Patient denies drinking alcohol, last alcohol probably 10 years ago.  Patient states his mother had liver cirrhosis and  from liver cancer.  Patient was not sure he had been diagnosed with any kind of liver disease in the past however he has been seen by Dr. Rodriguez at [facility] for liver cirrhosis.  Patient was on spironolactone, Lasix and nadolol at one time, but unfortunately he has been lost to follow-up.  Last EGD for EV surveillance was .  Result listed below.  There was an ultrasound of the abdomen/liver in  indicated for history of liver cancer (?)showed abnormal heterogeneous hyperechoic hepatic lesion  in the right liver lobe measuring 7.6 x 7 x 6.1 cm.  CT abdomen pelvis with IV contrast done today shows cirrhosis with portal hypertension, splenomegaly, small volume ascites, esophageal and gastric varices, thrombus in the main portal vein extending into the right portal vein branch, cavernous  transformation resulting in reconstitution of the left branch of the portal vein.  Multifocal infiltrating neoplasm or metastatic lesions in the liver.     Labs: WBC 7.2, hemoglobin 13.3, MCV 84, platelets 304.  Serum glucose 136, serum sodium 132, potassium 4.5, bicarbonate 18, BUN 56, creatinine 1.39.  Alkaline phosphatase 386, ALT 19, AST 40,  total bilirubin 2.0, direct bilirubin 1.0. Albumin 3.3.  Lactate 2.8.  Lipase and ammonia level normal. CRP 7.70, sed rate 80.  INR 1.4    ENDOSCOPIC REVIEW    EGD:  03/01/2017 indicated for follow-up esophageal varices  -Nonbleeding grade 2 esophageal varices, banded  -Erythematous mucosa in the gastric body  -Normal examined duodenum-  -No specimens collected    Colonoscopy 11/21/2017  - A 12 mm polyp in the cecum, status post resection.  Inflammatory/hyperplastic polyp  - A 10 mm polyp in the sigmoid colon, status post resection.  Tubular adenoma with moderate inflammation and lymphoid cell aggregates  - Three 4-5 mm polyps in the rectum.  Hyperplastic/inflammatory polyp with moderately severe inflammation in the lamina propria  - Small 15 mm polyp in the transverse colon, lipoma.  - Internal hemorrhoids  - repeat colonoscopy in 3 years for surveillance, due 2020.    Allergies  Lisinopril    Current Medication    Current Facility-Administered Medications:     allopurinol (Zyloprim) tablet 100 mg, 100 mg, oral, Daily, Scooby Pete MD, 100 mg at 03/22/24 1456    enoxaparin (Lovenox) syringe 40 mg, 40 mg, subcutaneous, q24h, Scooby Pete MD, 40 mg at 03/22/24 1456    finasteride (Proscar) tablet 5 mg, 5 mg, oral, Daily, Scooby Pete MD, 5 mg at 03/22/24 1456    [START ON 3/23/2024] levothyroxine (Synthroid, Levoxyl) tablet 50 mcg, 50 mcg, oral, Daily before breakfast, Scooby Pete MD    morphine injection 4 mg, 4 mg, intravenous, q4h PRN, Scooby Pete MD    ondansetron (Zofran) injection 4 mg, 4 mg, intravenous, q8h PRN, Scooby Pete MD     oxyCODONE (Roxicodone) immediate release tablet 5 mg, 5 mg, oral, q4h PRN, Scooby Pete MD    rosuvastatin (Crestor) tablet 5 mg, 5 mg, oral, Nightly, Scooby Pete MD    sennosides (Senokot) tablet 17.2 mg, 2 tablet, oral, BID, Scooby Pete MD, 17.2 mg at 03/22/24 1456    sodium chloride 0.9% infusion, 50 mL/hr, intravenous, Continuous, Scooby Pete MD, Last Rate: 50 mL/hr at 03/22/24 1456, 50 mL/hr at 03/22/24 1456    Past Medical History  Active Ambulatory Problems     Diagnosis Date Noted    Actinic keratoses 02/17/2023    Essential hypertension 02/17/2023    Hyperlipidemia 02/17/2023    Hyperuricemia 02/17/2023    Hypothyroidism 02/17/2023    Stage 2 chronic kidney disease 02/17/2023    Need for influenza vaccination 02/17/2023    Type 2 diabetes mellitus with microalbuminuria, with long-term current use of insulin (CMS/HCC) 02/17/2023    History of liver cancer 02/17/2023    Benign prostatic hyperplasia with urinary obstruction 08/22/2017    Hypothyroidism due to acquired atrophy of thyroid 03/22/2018    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    Polyp of colon 04/12/2023    Vitamin D deficiency 03/22/2018     Resolved Ambulatory Problems     Diagnosis Date Noted    Malignant neoplasm of liver (CMS/HCC) 03/07/2024     Past Medical History:   Diagnosis Date    Personal history of malignant neoplasm of liver 04/15/2021    Personal history of other diseases of the musculoskeletal system and connective tissue 07/31/2019       Past Surgical History  Past Surgical History:   Procedure Laterality Date    PROSTATE SURGERY         Family History  Family History   Problem Relation Name Age of Onset    Other (family history of hepatic cirrhosis) Mother      Other (Family history of liver cancer) Mother      Other (Family history of lung disease) Father      Other (Family history of neoplasm of brain) Sister      Other (Family history of malignant neoplasm of prostate) Brother     Mother history of hepatic  "cirrhosis, history of liver cancer.  Father history of lung cancer.  Sister malignant neoplasm of the brain.  Brother malignant neoplasm of the prostate.      Social History  TOBACCO:  reports that he has quit smoking. His smoking use included cigarettes. He has never used smokeless tobacco.  ETOH:  reports that he does not currently use alcohol.  DRUGS:  reports no history of drug use.    Review of Systems  Review of systems negative unless otherwise stated above.    PHYSICAL EXAM  VS: /70   Pulse 82   Temp 36.7 °C (98.1 °F)   Resp 18   Ht 1.778 m (5' 10\")   Wt 81.6 kg (180 lb)   SpO2 96%   BMI 25.83 kg/m²  Body mass index is 25.83 kg/m².    Constitutional:       Comments: frail   HENT:      Head: Normocephalic and atraumatic.      Mouth/Throat:      Mouth: Mucous membranes are moist.      Pharynx: Oropharynx is clear.   Eyes:      Extraocular Movements: Extraocular movements intact.      Conjunctiva/sclera: Conjunctivae normal.      Pupils: Pupils are equal, round, and reactive to light.   Cardiovascular:      Rate and Rhythm: Regular rhythm.      Heart sounds: No murmur heard.     No gallop.   Pulmonary:      Effort: Pulmonary effort is normal. No respiratory distress.      Breath sounds: Normal breath sounds. No wheezing, rhonchi or rales.   Abdominal:      General: Abdomen is flat. Bowel sounds are normal. There is distension.      Palpations: Abdomen is soft.      Tenderness: There is abdominal tenderness (diffuse). There is no guarding or rebound.   Musculoskeletal:         General: Normal range of motion.   Skin:     General: Skin is warm and dry.   Neurological:      General: No focal deficit present.      Mental Status: He is alert and oriented to person, place, and time.      Cranial Nerves: No cranial nerve deficit.      Sensory: No sensory deficit.      Motor: No weakness.   Psychiatric:         Mood and Affect: Mood normal.         Behavior: Behavior normal.     DATA  Recent blood work and " relevant radiology and endoscopic studies were reviewed and discussed with the patient   Results from last 7 days   Lab Units 03/22/24  0622   WBC AUTO x10*3/uL 7.2   RBC AUTO x10*6/uL 4.63   HEMOGLOBIN g/dL 13.3*   HEMATOCRIT % 38.8*   MCV fL 84   MCHC g/dL 34.3   RDW % 15.5*   PLATELETS AUTO x10*3/uL 304       Results from last 72 hours   Lab Units 03/22/24  0622   SODIUM mmol/L 132*   POTASSIUM mmol/L 4.5   CHLORIDE mmol/L 97*   CO2 mmol/L 18*   BUN mg/dL 56*   CREATININE mg/dL 1.39*   CALCIUM mg/dL 9.7   PROTEIN TOTAL g/dL 7.9   BILIRUBIN TOTAL mg/dL 2.0*   ALK PHOS U/L 386*   AST U/L 40*   ALT U/L 19       Results from last 72 hours   Lab Units 03/22/24  0622   INR  1.4*         RADIOLOGY REVIEW  === 03/22/24 ===    CT ABDOMEN PELVIS W IV CONTRAST    - Impression -  1.  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 See discussion  above.  2. The prostate is somewhat enlarged and the bladder is somewhat  distended and mildly thick-walled.  3. Multifocal infiltrating neoplasm or metastatic lesions in the  liver.      MACRO:  None    Signed by: Joseph Schoenberger 3/22/2024 8:07 AM  Dictation workstation:   [id number]       (Electronically signed byTRI Mtz on 3/22/2024 at 3:55 PM)

## 2024-03-23 NOTE — PROGRESS NOTES
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  Imaging is concerning for a large 15 cm liver lesion consistent with an elevated alpha-fetoprotein.  There is evidence of a portal vein thrombosis.    Current Medication    Current Facility-Administered Medications:     allopurinol (Zyloprim) tablet 100 mg, 100 mg, oral, Daily, Scooby Pete MD, 100 mg at 03/23/24 0811    enoxaparin (Lovenox) syringe 40 mg, 40 mg, subcutaneous, q24h, Scooby Pete MD, 40 mg at 03/23/24 1436    finasteride (Proscar) tablet 5 mg, 5 mg, oral, Daily, Scooby Pete MD, 5 mg at 03/23/24 0812    levothyroxine (Synthroid, Levoxyl) tablet 50 mcg, 50 mcg, oral, Daily before breakfast, Scooby Pete MD, 50 mcg at 03/23/24 0612    morphine injection 4 mg, 4 mg, intravenous, q4h PRN, Scooby Pete MD    ondansetron (Zofran) injection 4 mg, 4 mg, intravenous, q8h PRN, Scooby Pete MD, 4 mg at 03/22/24 1603    oxyCODONE (Roxicodone) immediate release tablet 5 mg, 5 mg, oral, q4h PRN, Scooby Pete MD    rosuvastatin (Crestor) tablet 5 mg, 5 mg, oral, Nightly, Scooby Pete MD, 5 mg at 03/22/24 2135    sennosides (Senokot) tablet 17.2 mg, 2 tablet, oral, BID, Scooby Pete MD, 17.2 mg at 03/22/24 2135    Past Medical History  Active Ambulatory Problems     Diagnosis Date Noted    Actinic keratoses 02/17/2023    Essential hypertension 02/17/2023    Hyperlipidemia 02/17/2023    Hyperuricemia 02/17/2023    Hypothyroidism 02/17/2023    Stage 2 chronic kidney disease 02/17/2023    Need for influenza vaccination 02/17/2023    Type 2 diabetes mellitus with microalbuminuria, with  "long-term current use of insulin (CMS/HCC) 02/17/2023    History of liver cancer 02/17/2023    Benign prostatic hyperplasia with urinary obstruction 08/22/2017    Hypothyroidism due to acquired atrophy of thyroid 03/22/2018    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    Polyp of colon 04/12/2023    Vitamin D deficiency 03/22/2018     Resolved Ambulatory Problems     Diagnosis Date Noted    Malignant neoplasm of liver (CMS/HCC) 03/07/2024     Past Medical History:   Diagnosis Date    Personal history of malignant neoplasm of liver 04/15/2021    Personal history of other diseases of the musculoskeletal system and connective tissue 07/31/2019       PHYSICAL EXAM  VS: /70 (BP Location: Right arm)   Pulse 68   Temp 36.6 °C (97.9 °F) (Temporal)   Resp 17   Ht 1.778 m (5' 10\")   Wt 81.6 kg (180 lb)   SpO2 98%   BMI 25.83 kg/m²  Body mass index is 25.83 kg/m².  Physical Exam  Cardiovascular:      Rate and Rhythm: Normal rate and regular rhythm.   Pulmonary:      Effort: Pulmonary effort is normal.      Breath sounds: Normal breath sounds.   Abdominal:      General: Bowel sounds are normal.      Palpations: Abdomen is soft.      Tenderness: There is abdominal tenderness.          DATA  Recent blood work and relevant radiology and endoscopic studies were reviewed and discussed with the patient   Results from last 7 days   Lab Units 03/23/24  0708   WBC AUTO x10*3/uL 6.0   RBC AUTO x10*6/uL 4.33*   HEMOGLOBIN g/dL 12.2*   HEMATOCRIT % 37.9*   MCV fL 88   MCHC g/dL 32.2   RDW % 15.9*   PLATELETS AUTO x10*3/uL 246       Results from last 72 hours   Lab Units 03/23/24  0708   SODIUM mmol/L 134*   POTASSIUM mmol/L 4.4   CHLORIDE mmol/L 102   CO2 mmol/L 24   BUN mg/dL 44*   CREATININE mg/dL 1.26   CALCIUM mg/dL 9.1   PROTEIN TOTAL g/dL 7.0   BILIRUBIN TOTAL mg/dL 1.5*   ALK PHOS U/L 301*   AST U/L 31   ALT U/L 15       Results from last 72 hours   Lab Units 03/22/24  0622   INR  1.4*       Results from last 72 hours   Lab Units " 03/22/24  0622   LIPASE U/L 40       RADIOLOGY REVIEW      ENDOSCOPIC REVIEW      IMPRESSION/RECOMMENDATIONS    78-year-old male with underlying cirrhosis secondary to alcohol with remote history of esophageal varices now presenting with abdominal pain.  Imaging is concerning for hepatocellular carcinoma with a 15 cm lesion in the liver as well as an elevated alpha-fetoprotein to 749.  There is evidence of a portal vein thrombosis.  Given the size of the lesion he would not be a candidate for liver transplant for treatment for the HCC.  Consult for oncology has been placed.  Patient reports that he does not want any major intervention if he cannot live a normal life.  Agree with holding anticoagulation given underlying presumed HCC.  No signs of active GI bleeding at this time.  Can hold off on EGD at this time  Will continue to follow        (Electronically signed byPurvi Vitale MD on 3/23/2024 at 3:15 PM)    1.21 Yes

## 2024-03-23 NOTE — CARE PLAN
The patient's goals for the shift include      The clinical goals for the shift include Pt. will demonstrate proper use of call light for needs throughout shift    Problem: Nutrition  Goal: Oral intake greater than 50%  Outcome: Progressing  Goal: Oral intake greater 75%  Outcome: Progressing     Problem: Pain - Adult  Goal: Verbalizes/displays adequate comfort level or baseline comfort level  Outcome: Met     Problem: Safety - Adult  Goal: Free from fall injury  Outcome: Met     Problem: Discharge Planning  Goal: Discharge to home or other facility with appropriate resources  Outcome: Progressing     Problem: Chronic Conditions and Co-morbidities  Goal: Patient's chronic conditions and co-morbidity symptoms are monitored and maintained or improved  Outcome: Progressing     Problem: Skin  Goal: Participates in plan/prevention/treatment measures  Outcome: Met  Goal: Prevent/manage excess moisture  Outcome: Met  Goal: Prevent/minimize sheer/friction injuries  Outcome: Met  Goal: Promote/optimize nutrition  Outcome: Met     Problem: Fall/Injury  Goal: Not fall by end of shift  Outcome: Met  Goal: Be free from injury by end of the shift  Outcome: Met  Goal: Pace activities to prevent fatigue by end of the shift  Outcome: Met     Problem: Pain  Goal: Takes deep breaths with improved pain control throughout the shift  Outcome: Met  Goal: Turns in bed with improved pain control throughout the shift  Outcome: Met  Goal: Walks with improved pain control throughout the shift  Outcome: Met  Goal: Performs ADL's with improved pain control throughout shift  Outcome: Met

## 2024-03-23 NOTE — PROGRESS NOTES
Medical Group Progress Note  ASSESSMENT & PLAN:     Abdominal pain  Cirrhosis  Suspected liver malignancy  Portal vein thrombosis  -2-3 weeks of progressive flank and abd pain/distension, with associated n/v  -mom passed from liver cancer  -abd pain related to ongoing liver process, likely malignancy  -portal vein thrombosis with distal reconstitution; would not anticoagulate at this time given high risk varices at least until surveillance EGD able to be done; cont with DVT Ppx  -MRI abd  -GI consultation; will need surveillance EGD at some point given varices on CT  -no ongoing evidence of bleeding, encephalopathy; INR mildly elevated 1.4  -d/w GI, he was actually diagnosed with cirrhosis and had variceal banding back in 2017but has been lost to follow up since  -not much ascites on CT, will consider re-imaging to consider paracentesis if he remains through the weekend  -will also consider IR guided biopsy while here  -will need close outpatient onc follow up       NUNO vs CKD  -Cr mildly elevated over baseline around 1, 1.39 today  -will give some gentle IVF as he has had poor intake and follow     BPH  -cont finasteride     VTE Prophylaxis: Lovenox    3/23/24:  Pain improved today  Renal function better, will stop IVF, intake ok  MRI reviewed, large R hepatic lobe centrally necrotic mass, R, L and main portal vein thrombus  Will have heme weigh in on anticoagulation for his; for now will cont with prophylactic lovenox  Will see if we can get an IR biopsy on Monday  EGD per GI either here or as outapatient      VTE Prophylaxis: lovenox      Scooby Pete MD    SUBJECTIVE     No overnight events. Pain is better today. Less n/v. Feels weak. No fever.     OBJECTIVE:     Last Recorded Vitals:  Vitals:    03/22/24 1414 03/22/24 1938 03/23/24 0315 03/23/24 1335   BP: 150/70 158/74 145/70 152/70   BP Location:  Right arm Right arm    Patient Position:  Sitting Lying Sitting   Pulse: 82 76 68    Resp:  18 18 17    Temp: 36.7 °C (98.1 °F) 36.7 °C (98.1 °F) 37 °C (98.6 °F) 36.6 °C (97.9 °F)   TempSrc:  Temporal Temporal    SpO2: 96% 97% 97%    Weight:       Height:         Last I/O:  I/O last 3 completed shifts:  In: 750 (9.2 mL/kg) [I.V.:750 (9.2 mL/kg)]  Out: 300 (3.7 mL/kg) [Urine:300 (0.1 mL/kg/hr)]  Weight: 81.6 kg     Physical Exam  Vitals reviewed.   Constitutional:       Comments: frail   HENT:      Head: Normocephalic and atraumatic.      Mouth/Throat:      Mouth: Mucous membranes are moist.      Pharynx: Oropharynx is clear.   Eyes:      Extraocular Movements: Extraocular movements intact.      Conjunctiva/sclera: Conjunctivae normal.      Pupils: Pupils are equal, round, and reactive to light.   Cardiovascular:      Rate and Rhythm: Regular rhythm.      Heart sounds: No murmur heard.     No gallop.   Pulmonary:      Effort: Pulmonary effort is normal. No respiratory distress.      Breath sounds: Normal breath sounds. No wheezing, rhonchi or rales.   Abdominal:      General: Abdomen is flat. Bowel sounds are normal. There is distension.      Palpations: Abdomen is soft.      Tenderness: There is abdominal tenderness (diffuse). There is no guarding or rebound.   Musculoskeletal:         General: Normal range of motion.   Skin:     General: Skin is warm and dry.   Neurological:      General: No focal deficit present.      Mental Status: He is alert and oriented to person, place, and time.      Cranial Nerves: No cranial nerve deficit.      Sensory: No sensory deficit.      Motor: No weakness.   Psychiatric:         Mood and Affect: Mood normal.         Behavior: Behavior normal.     Inpatient Medications:  allopurinol, 100 mg, oral, Daily  enoxaparin, 40 mg, subcutaneous, q24h  finasteride, 5 mg, oral, Daily  levothyroxine, 50 mcg, oral, Daily before breakfast  rosuvastatin, 5 mg, oral, Nightly  sennosides, 2 tablet, oral, BID    PRN Medications  PRN medications: morphine, ondansetron, oxyCODONE  Continuous  Medications:  sodium chloride 0.9%, 50 mL/hr, Last Rate: 50 mL/hr (03/22/24 2200)      LABS AND IMAGING:     Labs:  Results from last 7 days   Lab Units 03/23/24  0708 03/22/24  0622   WBC AUTO x10*3/uL 6.0 7.2   RBC AUTO x10*6/uL 4.33* 4.63   HEMOGLOBIN g/dL 12.2* 13.3*   HEMATOCRIT % 37.9* 38.8*   MCV fL 88 84   MCH pg 28.2 28.7   MCHC g/dL 32.2 34.3   RDW % 15.9* 15.5*   PLATELETS AUTO x10*3/uL 246 304     Results from last 7 days   Lab Units 03/23/24  0708 03/22/24  0622   SODIUM mmol/L 134* 132*   POTASSIUM mmol/L 4.4 4.5   CHLORIDE mmol/L 102 97*   CO2 mmol/L 24 18*   BUN mg/dL 44* 56*   CREATININE mg/dL 1.26 1.39*   GLUCOSE mg/dL 117* 136*   PROTEIN TOTAL g/dL 7.0 7.9   CALCIUM mg/dL 9.1 9.7   BILIRUBIN TOTAL mg/dL 1.5* 2.0*   ALK PHOS U/L 301* 386*   AST U/L 31 40*   ALT U/L 15 19     Results from last 7 days   Lab Units 03/23/24  0708 03/22/24  0622   MAGNESIUM mg/dL 2.20 2.32         Imaging:  MR abdomen w and wo IV contrast  Narrative: Interpreted By:  Zak Weldon,   STUDY:  MR ABDOMEN W AND WO IV CONTRAST;  3/22/2024 3:51 pm      INDICATION:  Signs/Symptoms:liver mass, likely malignancy;.      COMPARISON:  CT same day      ACCESSION NUMBER(S):  [id number]      ORDERING CLINICIAN:  KEVIN SOSA      TECHNIQUE:  MRI LIVER; Multiplanar magnetic resonance images of the abdomen were  obtained including the following sequences; T2-weighted SSFSE with  and without fat saturation, T1-weighted GRE in/opposed phase, DWI,  fat saturated 3D-T1w GRE pre and dynamically post contrast.  16.5 ml  of  Gadolinium contrast agent Dotarem were administered intravenously  without immediate complication.      FINDINGS:  Motion degraded exam.      LIVER:  No signal changes of steatosis. Diffusely markedly nodular and  cirrhotic.      There is a heterogeneous large mass or conglomerate of masses  occupying nearly the entire right lobe measuring about 14.2 x 13.5 x  14.7 cm. This may minimally extend into the left lobe  into segment  IV. The mass is arterial enhancing with extensive necrosis centrally.  There may be contrast washout involving the enhancing areas. This  appears to be in direct continuity with expansile enhancing tumor  thrombus within the right portal vein which crosses over to the  proximal portion of the left portal vein and the distal portion of  the main portal vein. There is some bland thrombus within the distal  posterior segmental branch of the right portal vein as well. On  motion degraded images, no definite additional discrete separate  masses are otherwise identified.      BILE DUCTS:  No dilatation.      GALLBLADDER:  No definite stone.      PANCREAS:  Grossly unremarkable. No ductal dilatation. No pancreas divisum.      SPLEEN:  Enlarged measuring 15 cm in length.      ADRENAL GLANDS:  Unremarkable.      KIDNEYS:  Unremarkable without hydronephrosis. Extra renal pelves suspected  bilaterally.      LYMPH NODES:  No lymphadenopathy.      ABDOMINAL VESSELS:  Abdominal aorta is atherosclerotic but otherwise patent without  aneurysm. Major visceral arterial branches are patent. Mild stenosis  near the origin of the superior mesenteric artery noted. IVC, renal  veins, and hepatic veins are patent. Extensive thrombosis of the  portal veins as described above. The splenic vein, SMV, and IMV  appear patent. Paraesophageal varices and recannulized umbilical vein  are noted reflecting portal hypertension.      BOWEL:  No dilated bowel is visualized.      PERITONEUM/RETROPERITONEUM:  Scattered moderate volume ascites.      BONES AND LOWER THORAX:  Mild lumbar degenerative changes. Grossly clear lung bases.          Impression: 1.  Cirrhosis and portal hypertension.  2. 15 cm mass or conglomerate of centrally necrotic masses occupying  nearly the entire right hepatic lobe, in contiguity with tumor  thrombus expanding the right portal vein with extension into the left  and main portal veins as well, consistent with  hepatocellular  carcinoma. There may be some extension of the mass into the left  lobe. (LI-RADS category LR-TIV).  3. No lymphadenopathy identified.  4. Partially imaged moderate volume ascites. No measurable peritoneal  disease otherwise identified.          MACRO:  None      Signed by: Zak Weldon 3/22/2024 4:36 PM  Dictation workstation:   [id number]  CT abdomen pelvis w IV contrast  Narrative: Interpreted By:  Schoenberger, Joseph,   STUDY:  CT ABDOMEN PELVIS W IV CONTRAST;  3/22/2024 7:56 am      INDICATION:  Signs/Symptoms:Generalized abdominal pain, nausea, vomiting, diarrhea.      COMPARISON:  None.      ACCESSION NUMBER(S):  [id number]      ORDERING CLINICIAN:  MAXINE ASIF      TECHNIQUE:  CT of the abdomen and pelvis was performed.  Standard contiguous  axial images were obtained at 3 mm slice thickness through the  abdomen and pelvis. Coronal and sagittal reconstructions at 3 mm  slice thickness were performed.      75 ml of contrast Omnipaque 350 were administered intravenously  without immediate complication.      FINDINGS:  LOWER CHEST:  Unremarkable .      ABDOMEN:      LIVER:  There is extensive contour nodularity of the liver consistent with  cirrhosis. There are multiple areas of capsular retraction along the  right anterolateral lobe. There are multiple heterogeneous  hypoattenuating masses in the liver suspicious for multifocal or  infiltrating neoplasm      BILE DUCTS:  No dilation      GALLBLADDER:  No calcified stones. No wall thickening.      PANCREAS:  Choose 1      SPLEEN:  Somewhat enlarged otherwise unremarkable.      ADRENAL GLANDS:  Bilateral adrenal glands appear normal.      KIDNEYS AND URETERS:  The kidneys are normal in size and enhance symmetrically.  No  hydroureteronephrosis or nephroureterolithiasis is identified.      PELVIS:      BLADDER:  Some circumferential wall thickening and distension.      REPRODUCTIVE ORGANS:  Enlarged prostate      BOWEL:  The stomach itself is  unremarkable. Enhancing structures in the  gastric wall at the gastric cardia as well as lower esophagus are  likely varices associated with portal hypertension. Small bowel loops  are normal in caliber without mural thickening. Colon is normal in  caliber without mural thickening. Multiple colonic diverticula  without acute diverticulitis. Appendix appears normal.      VESSELS:  There is a filling defect in the main portal vein with lack of  opacification of the right portal venous branch consistent with  portal vein thrombosis. There are multiple tubular enhancing  structures in the portal hilum consistent with cavernous  transformation which reconstitute the left portal vein branch. The  splenic vein appears patent.      PERITONEUM/RETROPERITONEUM/LYMPH NODES:  There is a small to moderate volume of ascites. There is also some  venous congestion of the mesentery. These findings are likely  associated with portal hypertension. There is no pathologic  enlargement of abdominal or pelvic lymph nodes.      BONES AND ABDOMINAL WALL:  No suspicious osseous lesions are identified. There are fat  containing bilateral inguinal hernias which are relatively small. The  abdominal wall soft tissues appear otherwise intact.      Impression: 1.  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 See discussion  above.  2. The prostate is somewhat enlarged and the bladder is somewhat  distended and mildly thick-walled.  3. Multifocal infiltrating neoplasm or metastatic lesions in the  liver.          MACRO:  None      Signed by: Joseph Schoenberger 3/22/2024 8:07 AM  Dictation workstation:   [id number]

## 2024-03-24 NOTE — CARE PLAN
The patient's goals for the shift include      The clinical goals for the shift include promote restful sleep, comfort, safety

## 2024-03-24 NOTE — CARE PLAN
The patient's goals for the shift include      The clinical goals for the shift include patient will remain comfortable and safe. Promote sleep.

## 2024-03-24 NOTE — CARE PLAN
The patient's goals for the shift include      The clinical goals for the shift include promote restful sleep, comfort, safety       Denies broken and loose teeth/normal

## 2024-03-24 NOTE — PROGRESS NOTES
Department of Internal Medicine  Gastroenterology  Progress note      Subjective  GI is following for abdominal pain    Today, he denies any abdominal pain or vomiting.  He has had some mild nausea but the Zofran did help.  He is tolerating his diet.    Discussed with Dr. Pete.  Onc originally suggested biopsy but this was prior to his MRI. Typically HCC is not biopsied due to their vascular nature.     Current Medication    Current Facility-Administered Medications:     allopurinol (Zyloprim) tablet 100 mg, 100 mg, oral, Daily, Scooby Pete MD, 100 mg at 03/24/24 0911    enoxaparin (Lovenox) syringe 40 mg, 40 mg, subcutaneous, q24h, Scooby Pete MD, 40 mg at 03/23/24 1436    finasteride (Proscar) tablet 5 mg, 5 mg, oral, Daily, Scooby Pete MD, 5 mg at 03/24/24 0912    levothyroxine (Synthroid, Levoxyl) tablet 50 mcg, 50 mcg, oral, Daily before breakfast, Scooby Pete MD, 50 mcg at 03/24/24 0543    morphine injection 4 mg, 4 mg, intravenous, q4h PRN, Scooby Pete MD, 4 mg at 03/24/24 0120    ondansetron (Zofran) injection 4 mg, 4 mg, intravenous, q8h PRN, Scooby Pete MD, 4 mg at 03/24/24 0123    oxyCODONE (Roxicodone) immediate release tablet 5 mg, 5 mg, oral, q4h PRN, Scooby Pete MD    rosuvastatin (Crestor) tablet 5 mg, 5 mg, oral, Nightly, cSooby Pete MD, 5 mg at 03/23/24 2041    sennosides (Senokot) tablet 17.2 mg, 2 tablet, oral, BID, Scooby Pete MD, 17.2 mg at 03/24/24 0911    Past Medical History  Active Ambulatory Problems     Diagnosis Date Noted    Actinic keratoses 02/17/2023    Essential hypertension 02/17/2023    Hyperlipidemia 02/17/2023    Hyperuricemia 02/17/2023    Hypothyroidism 02/17/2023    Stage 2 chronic kidney disease 02/17/2023    Need for influenza vaccination 02/17/2023    Type 2 diabetes mellitus with microalbuminuria, with long-term current use of insulin (CMS/HCC) 02/17/2023    History of liver cancer 02/17/2023    Benign prostatic  "hyperplasia with urinary obstruction 08/22/2017    Hypothyroidism due to acquired atrophy of thyroid 03/22/2018    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    Polyp of colon 04/12/2023    Vitamin D deficiency 03/22/2018     Resolved Ambulatory Problems     Diagnosis Date Noted    Malignant neoplasm of liver (CMS/HCC) 03/07/2024     Past Medical History:   Diagnosis Date    Personal history of malignant neoplasm of liver 04/15/2021    Personal history of other diseases of the musculoskeletal system and connective tissue 07/31/2019       PHYSICAL EXAM  VS: /71 (BP Location: Right arm, Patient Position: Lying)   Pulse 66   Temp 36.1 °C (97 °F) (Temporal)   Resp 17   Ht 1.778 m (5' 10\")   Wt 81.6 kg (180 lb)   SpO2 97%   BMI 25.83 kg/m²  Body mass index is 25.83 kg/m².  Physical Exam  Constitutional:       General: He is not in acute distress.     Appearance: Normal appearance.   HENT:      Mouth/Throat:      Mouth: Mucous membranes are moist.      Pharynx: Oropharynx is clear.   Eyes:      General: No scleral icterus.     Extraocular Movements: Extraocular movements intact.      Conjunctiva/sclera: Conjunctivae normal.      Pupils: Pupils are equal, round, and reactive to light.   Cardiovascular:      Rate and Rhythm: Normal rate and regular rhythm.      Heart sounds: No murmur heard.  Pulmonary:      Effort: Pulmonary effort is normal.      Breath sounds: Normal breath sounds. No wheezing or rhonchi.   Abdominal:      General: Bowel sounds are normal. There is no distension.      Palpations: Abdomen is soft. There is no mass.      Tenderness: There is no abdominal tenderness.      Hernia: No hernia is present.   Musculoskeletal:         General: No swelling or deformity.   Skin:     General: Skin is warm.      Coloration: Skin is not jaundiced.   Neurological:      General: No focal deficit present.      Mental Status: He is alert and oriented to person, place, and time.      Comments: Negative for asterixis "   Psychiatric:         Mood and Affect: Mood normal.          DATA  Recent blood work and relevant radiology and endoscopic studies were reviewed and discussed with the patient   Results from last 7 days   Lab Units 03/24/24  0548   WBC AUTO x10*3/uL 6.4   RBC AUTO x10*6/uL 4.26*   HEMOGLOBIN g/dL 11.9*   HEMATOCRIT % 37.2*   MCV fL 87   MCHC g/dL 32.0   RDW % 15.7*   PLATELETS AUTO x10*3/uL 202         Results from last 72 hours   Lab Units 03/24/24  0548   SODIUM mmol/L 132*   POTASSIUM mmol/L 4.9   CHLORIDE mmol/L 102   CO2 mmol/L 24   BUN mg/dL 33*   CREATININE mg/dL 1.10   CALCIUM mg/dL 9.3   PROTEIN TOTAL g/dL 6.9   BILIRUBIN TOTAL mg/dL 1.6*   ALK PHOS U/L 329*   AST U/L 42*   ALT U/L 16         Results from last 72 hours   Lab Units 03/22/24  0622   INR  1.4*         Results from last 72 hours   Lab Units 03/22/24  0622   LIPASE U/L 40         RADIOLOGY REVIEW  MRI abdomen W WO Contrast 3/22/24  IMPRESSION:  1.  Cirrhosis and portal hypertension.  2. 15 cm mass or conglomerate of centrally necrotic masses occupying  nearly the entire right hepatic lobe, in contiguity with tumor  thrombus expanding the right portal vein with extension into the left  and main portal veins as well, consistent with hepatocellular  carcinoma. There may be some extension of the mass into the left  lobe. (LI-RADS category LR-TIV).  3. No lymphadenopathy identified.  4. Partially imaged moderate volume ascites. No measurable peritoneal  disease otherwise identified.    ENDOSCOPIC REVIEW  NA    IMPRESSION/RECOMMENDATIONS    78-year-old male with underlying cirrhosis secondary to alcohol with remote history of esophageal varices now presenting with abdominal pain.      Imaging is concerning for hepatocellular carcinoma with a 15 cm lesion in the liver as well as an elevated alpha-fetoprotein to 749.  There is evidence of a portal vein thrombosis.  Given the size of the lesion he would not be a candidate for liver transplant for  treatment for the HCC.  Typically not recommended to biopsy these lesions given their vascularity.    Consult for oncology has been placed.  Patient reports that he does not want any major intervention if he cannot live a normal life.    Agree with holding anticoagulation given underlying presumed HCC.  No signs of active GI bleeding at this time.  Recommend outpatient EGD    Discussed with KYLAH Hudson to sign off, please call with questions or concerns      (Electronically signed byDeedee Velez PA-C on 3/24/2024 at 10:31 AM)

## 2024-03-24 NOTE — DISCHARGE SUMMARY
Discharge Diagnosis  Liver lesion - likely hepatocellular carcinoma  Portal vein thrombosis  Cirrhosis  NUNO  BPH    Issues Requiring Follow-Up  Oncology    Discharge Meds     Your medication list        START taking these medications        Instructions Last Dose Given Next Dose Due   oxyCODONE 5 mg immediate release tablet  Commonly known as: Roxicodone      Take 1 tablet (5 mg) by mouth every 6 hours if needed for moderate pain (4 - 6).              CONTINUE taking these medications        Instructions Last Dose Given Next Dose Due   acetaminophen 500 mg tablet  Commonly known as: Tylenol           allopurinol 100 mg tablet  Commonly known as: Zyloprim      Take 1 tablet (100 mg) by mouth once daily.       aspirin 81 mg EC tablet           cholecalciferol 50 mcg (2,000 unit) capsule  Commonly known as: Vitamin D-3           empagliflozin 10 mg  Commonly known as: Jardiance      Take 1 tablet (10 mg) by mouth once daily.       finasteride 5 mg tablet  Commonly known as: Proscar      Take 1 tablet (5 mg) by mouth once daily.       furosemide 20 mg tablet  Commonly known as: Lasix      Take 1 tablet (20 mg) by mouth once daily.       levothyroxine 50 mcg tablet  Commonly known as: Synthroid, Levoxyl      Take 1 tablet (50 mcg) by mouth once daily in the morning. Take before meals.       pramipexole 0.125 mg tablet  Commonly known as: Mirapex      Take 1 tablet (0.125 mg) by mouth once daily at bedtime.       rosuvastatin 5 mg tablet  Commonly known as: Crestor      Take 1 tablet (5 mg) by mouth once daily.       spironolactone 50 mg tablet  Commonly known as: Aldactone      Take 1 tablet (50 mg) by mouth once daily.              STOP taking these medications      metFORMIN  mg 24 hr tablet  Commonly known as: Glucophage-XR                  Where to Get Your Medications        These medications were sent to [facility]  - [place], [place] - [address] [address]  [address], [place] [address]      Phone:  [phone]   oxyCODONE 5 mg immediate release tablet         Test Results Pending At Discharge  Pending Labs       Order Current Status    Anti-smooth muscle antibody, IgG In process    Antimitochondrial antibody In process            Hospital Course   58-year-old male with past medical history of BPH, restless leg syndrome, hypothyroidism, hyperlipidemia, who presented to the emergency department with 3 weeks of progressive abdominal distention and pain associated with nausea and vomiting.  He is also had significant weight loss.  In the emergency department he underwent CT imaging which showed cirrhosis and portal hypertension as well as esophageal and gastric varices, thrombosis in the main portal vein extending into the right main portal branch and cavernous transformation resulting in reconstitution of the left branch, multifocal infiltrating neoplasm or metastatic lesions in the liver.  Oncology was called to the emergency department recommended an MRI and possible biopsy of the lesion.  He did undergo an MRI which showed a 15 cm mass or conglomerate of centrally necrotic mass is occupying nearly the entire right hepatic lobe as well as tumor thrombus expanding in the right portal vein.  LIRADS-TIV.  Possibility of anticoagulation was discussed with hematology and it was agreed that we would hold off on any anticoagulation for the portal vein thrombosis at this point given the tumor invasion as well as his known varices.  GI was consulted given the cirrhosis which was not new as well as this mass.  Given its characteristics and his history it is felt to be HCC and GI did not feel like a biopsy was warranted.  We will refer patient for outpatient oncology evaluation urgently.  We do not have oncology at this hospital.  He has remained hemodynamically stable otherwise and tolerating a diet.  He will continue his other home meds and plan for close oncology follow-up to determine next steps going forward.   35-minute spent discharge activities.    Pertinent Physical Exam At Time of Discharge  Vitals reviewed.   Constitutional:       Comments: frail   HENT:      Head: Normocephalic and atraumatic.      Mouth/Throat:      Mouth: Mucous membranes are moist.      Pharynx: Oropharynx is clear.   Eyes:      Extraocular Movements: Extraocular movements intact.      Conjunctiva/sclera: Conjunctivae normal.      Pupils: Pupils are equal, round, and reactive to light.   Cardiovascular:      Rate and Rhythm: Regular rhythm.      Heart sounds: No murmur heard.     No gallop.   Pulmonary:      Effort: Pulmonary effort is normal. No respiratory distress.      Breath sounds: Normal breath sounds. No wheezing, rhonchi or rales.   Abdominal:      General: Abdomen is flat. Bowel sounds are normal. There is distension.      Palpations: Abdomen is soft.      Tenderness: There is abdominal tenderness (diffuse). There is no guarding or rebound.   Musculoskeletal:         General: Normal range of motion.   Skin:     General: Skin is warm and dry.   Neurological:      General: No focal deficit present.      Mental Status: He is alert and oriented to person, place, and time.      Cranial Nerves: No cranial nerve deficit.      Sensory: No sensory deficit.      Motor: No weakness.   Psychiatric:         Mood and Affect: Mood normal.         Behavior: Behavior normal.     Outpatient Follow-Up  No future appointments.      Scooby Pete MD

## 2024-03-24 NOTE — CARE PLAN
The patient's goals for the shift include      The clinical goals for the shift include promote restful sleep, comfort, safety    Problem: Nutrition  Goal: Less than 5 days NPO/clear liquids  Outcome: Met  Goal: Oral intake greater than 50%  Outcome: Progressing  Goal: BG  mg/dL  Outcome: Met     Problem: Discharge Planning  Goal: Discharge to home or other facility with appropriate resources  Outcome: Met     Problem: Chronic Conditions and Co-morbidities  Goal: Patient's chronic conditions and co-morbidity symptoms are monitored and maintained or improved  Outcome: Progressing     Problem: Fall/Injury  Goal: Use assistive devices by end of the shift  Outcome: Met     Problem: Diabetes  Goal: Maintain glucose levels >70mg/dl to <250mg/dl throughout shift  Outcome: Met  Goal: No changes in neurological exam by end of shift  Outcome: Met     Problem: Pain  Goal: Free from opioid side effects throughout the shift  Outcome: Met  Goal: Free from acute confusion related to pain meds throughout the shift  Outcome: Met

## 2024-03-26 NOTE — PROGRESS NOTES
Discharge Facility: [facility]  Discharge Diagnosis: Liver lesion - likely hepatocellular carcinoma; Portal vein thrombosis; Cirrhosis; NUNO; BPH  Admission Date: 3/22/2024  Discharge Date: 3/24/2024    PCP Appointment Date: 3/28/2024 with Alba PA  Specialist Appointment Date: 3/29/2024 Oncology  Hospital Encounter and Summary: Linked  See discharge assessment below for further details  Engagement  Call Start Time: 0936 (3/26/2024  9:46 AM)    Medications  Medications reviewed with patient/caregiver?: Yes (new meds/changes discussed with patient) (3/26/2024  9:46 AM)  Is the patient having any side effects they believe may be caused by any medication additions or changes?: No (3/26/2024  9:46 AM)  Does the patient have all medications ordered at discharge?: Yes (3/26/2024  9:46 AM)  Care Management Interventions: No intervention needed (3/26/2024  9:46 AM)  Prescription Comments: see med list (oxycodone; stop metformin) (3/26/2024  9:46 AM)  Is the patient taking all medications as directed (includes completed medication regime)?: Yes (3/26/2024  9:46 AM)  Care Management Interventions: Provided patient education (3/26/2024  9:46 AM)    Appointments  Does the patient have a primary care provider?: Yes (3/26/2024  9:46 AM)  Care Management Interventions: Verified appointment date/time/provider (3/26/2024  9:46 AM)  Has the patient kept scheduled appointments due by today?: Yes (3/26/2024  9:46 AM)  Care Management Interventions: Advised patient to keep appointment (3/26/2024  9:46 AM)    Self Management  What is the home health agency?: Denies need (3/26/2024  9:46 AM)    Patient Teaching  Does the patient have access to their discharge instructions?: Yes (3/26/2024  9:46 AM)  Care Management Interventions: Reviewed instructions with patient (3/26/2024  9:46 AM)  What is the patient's perception of their health status since discharge?: Improving (3/26/2024  9:46 AM)  Is the patient/caregiver able to  "teach back the hierarchy of who to call/visit for symptoms/problems? PCP, Specialist, Home Health nurse, Urgent Care, ED, 911: Yes (3/26/2024  9:46 AM)  Patient/Caregiver Education Comments: Patient states he is \"feeling a lot better\" now that he is home from the hospital. Medications and fup appts discussed. States he has adequate supports and services at home. (3/26/2024  9:46 AM)        "

## 2024-03-28 NOTE — PROGRESS NOTES
"Chief Complaint   Patient presents with    Hospital Follow-up     Dr Griggs pt here today for a hospital follow up from [facility]  Discharge Facility: [facility]  Discharge Diagnosis: Liver lesion - likely hepatocellular carcinoma; Portal vein thrombosis; Cirrhosis; NUNO; BPH  Admission Date: 3/22/2024  Discharge Date: 3/24/2024    Since being out feeling the same; stomach pain but no more vomiting. Went in due to throwing up every 10 mins.         Patient: Mu Elise  : 1945  PCP: Geovani Griggs DO  MRN: [id number]  Program: Transitional Care Management  Status: Enrolled  Effective Dates: 3/26/2024 - present  Responsible Staff: Kirk Arthur RN  Social Determinants to be Addressed: Physical Activity, Social Connections, Stress, Tobacco Use, Transportation Needs         Mu Elise is a 78 y.o. male presenting today for follow-up after being discharged from the hospital 2 days ago. The main problem requiring admission was liver lesion likely HCC, cirrhosis, portal vein thrombosis, NUNO, BPH. The discharge summary and/or Transitional Care Management documentation was reviewed. Medication reconciliation was performed as indicated via the \"Christiano as Reviewed\" timestamp.     Mu Elise was contacted by Transitional Care Management services two days after his discharge. This encounter and supporting documentation was reviewed.    Review of Systems   Gastrointestinal:  Positive for abdominal distention and abdominal pain.       /84   Pulse 63   Temp 36.2 °C (97.1 °F)   Resp 18   Ht 1.778 m (5' 10\")   Wt 76.7 kg (169 lb)   SpO2 99%   BMI 24.25 kg/m²     Physical Exam  Constitutional:       General: He is not in acute distress.     Appearance: Normal appearance. He is not ill-appearing.   HENT:      Head: Normocephalic.   Cardiovascular:      Rate and Rhythm: Normal rate and regular rhythm.      Pulses: Normal pulses.      Heart sounds: Normal heart sounds. No murmur heard.  Pulmonary:      " Effort: Pulmonary effort is normal.      Breath sounds: Normal breath sounds.   Abdominal:      General: There is distension.      Tenderness: There is no abdominal tenderness. There is no guarding or rebound.   Neurological:      Mental Status: He is alert.   Psychiatric:         Mood and Affect: Mood normal.         Behavior: Behavior normal.         The complexity of medical decision making for this patient's transitional care is high.    Assessment/Plan   Problem List Items Addressed This Visit    None  Visit Diagnoses         Codes    Hepatocellular carcinoma (CMS/HCC)    -  Primary C22.0    Relevant Orders    Referral to Hematology and Oncology    Alcoholic cirrhosis of liver with ascites (CMS/HCC)     K70.31          - Sees oncologist tomorrow for initial visit   - Sxs stable, most bothersome is the ascites but wasn't enough to drain yet so has to monitor and taking spironolactone   - stop the aspirin due to varices   - Oxycodone from hospital is helping for pain, takes 1 tablet nightly

## 2024-03-29 PROBLEM — C22.0 HEPATOCELLULAR CARCINOMA (MULTI): Status: ACTIVE | Noted: 2024-01-01

## 2024-03-29 NOTE — PROGRESS NOTES
Patient ID: Mu Elise is a 78 y.o. male.  Date of Service: 03/29/24  Referring Physician: Scooby Pete MD  [address] [place] [address]  Primary Care Provider: Geovani Griggs DO      Subjective    Oncology History   Hepatocellular carcinoma (CMS/HCC)   3/22/2024 Initial Diagnosis    Hepatocellular carcinoma (CMS/HCC)    History of alcohol-related cirrhosis and varices requiring banding in 2017.  Presented with nausea, abdominal distention and pain.  Imaging studies on 3/22/2024 demonstrated cirrhosis, portal hypertension with esophageal and gastric varices.  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 MRI of the abdomen demonstrated 15 cm conglomerate of centrally necrotic masses with tumor thrombus extending into right portal vein with extension into the left and main portal vein. Ascites was seen. This was considered LI-RADS LR-TIV.  AFP was 412     4/10/2024 -  Chemotherapy    Tremelimumab + Durvalumab, 28 Day Cycles         HPI  Patient is here to discuss further treatment options.  He does report fatigue and abdominal pain.  He is taking oxycodone which helps manage his pain.    Review of Systems   Constitutional:  Positive for appetite change and fatigue. Negative for chills and fever.   HENT:  Negative.     Eyes: Negative.    Respiratory: Negative.     Cardiovascular: Negative.    Gastrointestinal:  Positive for abdominal distention and abdominal pain. Negative for nausea and vomiting.   Endocrine: Negative.    Genitourinary: Negative.     Musculoskeletal: Negative.    Skin: Negative.    Neurological: Negative.    Hematological: Negative.    Psychiatric/Behavioral: Negative.     All other systems reviewed and are negative.         Patient Active Problem List   Diagnosis    Actinic keratoses    Essential hypertension    Hyperlipidemia    Hyperuricemia    Hypothyroidism    Stage 2 chronic kidney disease    Need for  influenza vaccination    Type 2 diabetes mellitus with microalbuminuria, with long-term current use of insulin (CMS/HCC)    History of liver cancer    Benign prostatic hyperplasia with urinary obstruction    Hypothyroidism due to acquired atrophy of thyroid    Neuropathic bladder    Polyp of colon    Vitamin D deficiency    Liver lesion    Hepatocellular carcinoma (CMS/HCC)     Past Medical History:   Diagnosis Date    Personal history of malignant neoplasm of liver 04/15/2021    History of liver cancer    Personal history of other diseases of the musculoskeletal system and connective tissue 07/31/2019    History of gout     Past Surgical History:   Procedure Laterality Date    PROSTATE SURGERY       Family History   Problem Relation Name Age of Onset    Other (family history of hepatic cirrhosis) Mother      Other (Family history of liver cancer) Mother      Other (Family history of lung disease) Father      Other (Family history of neoplasm of brain) Sister      Other (Family history of malignant neoplasm of prostate) Brother         Current Outpatient Medications:     acetaminophen (Tylenol) 500 mg tablet, Take 2 tablets (1,000 mg) by mouth every 6 hours., Disp: , Rfl:     allopurinol (Zyloprim) 100 mg tablet, Take 1 tablet (100 mg) by mouth once daily., Disp: 90 tablet, Rfl: 3    aspirin 81 mg EC tablet, Take 1 tablet (81 mg) by mouth once daily., Disp: , Rfl:     cholecalciferol (Vitamin D-3) 50 mcg (2,000 unit) capsule, Take 1 capsule (50 mcg) by mouth early in the morning.., Disp: , Rfl:     empagliflozin (Jardiance) 10 mg, Take 1 tablet (10 mg) by mouth once daily. (Patient taking differently: Take 1 tablet (10 mg) by mouth 3 times a week. Monday, Wednesday & Friday), Disp: 90 tablet, Rfl: 3    finasteride (Proscar) 5 mg tablet, Take 1 tablet (5 mg) by mouth once daily., Disp: 90 tablet, Rfl: 3    furosemide (Lasix) 20 mg tablet, Take 1 tablet (20 mg) by mouth once daily., Disp: 90 tablet, Rfl: 3     levothyroxine (Synthroid, Levoxyl) 50 mcg tablet, Take 1 tablet (50 mcg) by mouth once daily in the morning. Take before meals., Disp: 90 tablet, Rfl: 3    pramipexole (Mirapex) 0.125 mg tablet, Take 1 tablet (0.125 mg) by mouth once daily at bedtime., Disp: 90 tablet, Rfl: 3    rosuvastatin (Crestor) 5 mg tablet, Take 1 tablet (5 mg) by mouth once daily., Disp: 90 tablet, Rfl: 3    sennosides (Senokot) 8.6 mg tablet, Take 2 tablets (17.2 mg) by mouth every 12 hours., Disp: , Rfl:     spironolactone (Aldactone) 50 mg tablet, Take 1 tablet (50 mg) by mouth once daily., Disp: 90 tablet, Rfl: 3    oxyCODONE (Roxicodone) 5 mg immediate release tablet, Take 1 tablet (5 mg) by mouth every 6 hours if needed for moderate pain (4 - 6)., Disp: 90 tablet, Rfl: 0    prochlorperazine (Compazine) 10 mg tablet, Take 1 tablet (10 mg) by mouth every 6 hours if needed for nausea or vomiting., Disp: 30 tablet, Rfl: 5      Objective   BSA: 1.95 meters squared  /63   Pulse 75   Temp 36.7 °C (98.1 °F)   Resp 18   Wt 77.3 kg (170 lb 6.7 oz)   SpO2 97%   BMI 24.45 kg/m²     Performance Status:  Symptomatic; fully ambulatory    Physical Exam  Vitals reviewed.   Constitutional:       General: He is not in acute distress.     Appearance: Normal appearance.   HENT:      Head: Normocephalic and atraumatic.   Eyes:      Extraocular Movements: Extraocular movements intact.      Pupils: Pupils are equal, round, and reactive to light.   Cardiovascular:      Rate and Rhythm: Normal rate and regular rhythm.   Pulmonary:      Effort: Pulmonary effort is normal.      Breath sounds: Normal breath sounds.   Abdominal:      General: Bowel sounds are normal.      Tenderness: There is no abdominal tenderness.   Musculoskeletal:         General: Normal range of motion.   Neurological:      General: No focal deficit present.      Mental Status: He is alert and oriented to person, place, and time.   Psychiatric:         Behavior: Behavior normal.          === 03/22/24 ===    CT ABDOMEN PELVIS W IV CONTRAST    - Impression -  1.  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 See discussion  above.  2. The prostate is somewhat enlarged and the bladder is somewhat  distended and mildly thick-walled.  3. Multifocal infiltrating neoplasm or metastatic lesions in the  liver.      MACRO:  None    Signed by: Joseph Schoenberger 3/22/2024 8:07 AM  Dictation workstation:   [id number]    Assessment/Plan       #1 multifocal HCC with vascular invasion  #2 alcohol-related cirrhosis    Patient is a 78 y.o. male with alcohol-related cirrhosis who presented with multifocal HCC with vascular invasion.  There is involvement of main portal vein.  She did have extensive varices which required banding in 2017.  His Child-Hudson score is B8.    I discussed in detail with the patient the natural history and prognosis of HCC and available treatment options.  We would recommend systemic therapeutic options.  Treatment is palliative and not curative in nature.  In terms of first-line treatment options involve atezolizumab plus bevacizumab, tremelimumab plus durvalumab, sorafenib and lenvatinib.  With extensive viruses he is not a great candidate for bevacizumab.  After prolonged discussion patient decided to receive tremelimumab plus durvalumab.  I discussed the side effect of this regimen in detail with the patient.  Plan would be to tentatively begin treatment on 4/10/2024.  He will likely receive treatment at [facility].  I also ordered a CT of chest to complete the staging workup.  NGS was ordered.  Prescription for oxycodone was sent for pain management.    Patient will come for evaluation prior to cycle #2.      Plan  was discussed in detail with the patient and patient was in agreement with the plan of care.           Torsten Red MD

## 2024-03-29 NOTE — PROGRESS NOTES
ONCOLOGY CLINICAL PHARMACY NOTE     Subjective  Mu Elise is a 78 y.o. male with liver cancer (HCC), here for clinical assessment and education.        Treatment history  Treatment Details   Treatment goal [No plan goal]   Plan Name Tremelimumab + Durvalumab, 28 Day Cycles   Status Active   Start Date 4/10/2024 (Planned)   End Date 3/7/2025 (Planned)   Provider Torsten Red MD   Chemotherapy durvalumab (Imfinzi) 1,500 mg in sodium chloride 0.9% 130 mL IV, 1,500 mg, intravenous, Once, 0 of 12 cycles    tremelimumab-actl (Imjudo) 300 mg in dextrose 5 % in water (D5W) 100 mL IV, 300 mg, intravenous, Once, 0 of 1 cycle       Treatment Details   Treatment goal [No plan goal]   Plan Name Venous Access Orders   Status Active   Start Date 3/29/2024   End Date Until discontinued   Provider Torsten Red MD   Chemotherapy [No matching medication found in this treatment plan]        Objective  There were no vitals taken for this visit.  Lab Results   Component Value Date    WBC 6.4 03/24/2024    HGB 11.9 (L) 03/24/2024    HCT 37.2 (L) 03/24/2024    MCV 87 03/24/2024     03/24/2024      Lab Results   Component Value Date    GLUCOSE 122 (H) 03/24/2024    CALCIUM 9.3 03/24/2024     (L) 03/24/2024    K 4.9 03/24/2024    CO2 24 03/24/2024     03/24/2024    BUN 33 (H) 03/24/2024    CREATININE 1.10 03/24/2024     Lab Results   Component Value Date    ALT 16 03/24/2024    AST 42 (H) 03/24/2024    ALKPHOS 329 (H) 03/24/2024    BILITOT 1.6 (H) 03/24/2024       Allergies and Medications   Allergies   Allergen Reactions    Lisinopril Other     hyperkalemia       Current Outpatient Medications:     acetaminophen (Tylenol) 500 mg tablet, Take 2 tablets (1,000 mg) by mouth every 6 hours., Disp: , Rfl:     allopurinol (Zyloprim) 100 mg tablet, Take 1 tablet (100 mg) by mouth once daily., Disp: 90 tablet, Rfl: 3    aspirin 81 mg EC tablet, Take 1 tablet (81 mg) by mouth once daily., Disp: , Rfl:     cholecalciferol  (Vitamin D-3) 50 mcg (2,000 unit) capsule, Take 1 capsule (50 mcg) by mouth early in the morning.., Disp: , Rfl:     empagliflozin (Jardiance) 10 mg, Take 1 tablet (10 mg) by mouth once daily. (Patient taking differently: Take 1 tablet (10 mg) by mouth 3 times a week. Monday, Wednesday & Friday), Disp: 90 tablet, Rfl: 3    finasteride (Proscar) 5 mg tablet, Take 1 tablet (5 mg) by mouth once daily., Disp: 90 tablet, Rfl: 3    furosemide (Lasix) 20 mg tablet, Take 1 tablet (20 mg) by mouth once daily., Disp: 90 tablet, Rfl: 3    levothyroxine (Synthroid, Levoxyl) 50 mcg tablet, Take 1 tablet (50 mcg) by mouth once daily in the morning. Take before meals., Disp: 90 tablet, Rfl: 3    oxyCODONE (Roxicodone) 5 mg immediate release tablet, Take 1 tablet (5 mg) by mouth every 6 hours if needed for moderate pain (4 - 6)., Disp: 90 tablet, Rfl: 0    pramipexole (Mirapex) 0.125 mg tablet, Take 1 tablet (0.125 mg) by mouth once daily at bedtime., Disp: 90 tablet, Rfl: 3    prochlorperazine (Compazine) 10 mg tablet, Take 1 tablet (10 mg) by mouth every 6 hours if needed for nausea or vomiting., Disp: 30 tablet, Rfl: 5    rosuvastatin (Crestor) 5 mg tablet, Take 1 tablet (5 mg) by mouth once daily., Disp: 90 tablet, Rfl: 3    sennosides (Senokot) 8.6 mg tablet, Take 2 tablets (17.2 mg) by mouth every 12 hours., Disp: , Rfl:     spironolactone (Aldactone) 50 mg tablet, Take 1 tablet (50 mg) by mouth once daily., Disp: 90 tablet, Rfl: 3    Assessment and Plan  Mu Elise is a 78 y.o. male with HCC, to be treated with durvalumab and tremelimumab    Tremelimumab (1 hour) on day 1 of cycle 1 only  Durvalumab (1 hour) on day 1 of each 28 day cycle.    Chemotherapy  1.  Chemotherapy Education: The chemotherapy regimen was discussed with the patient/family including treatment schedule, potential side effects, and management of side effects.  Potential side effects include: fatigue, diarrhea, mild nausea, muscle aches/abdominal  discomfort, skin rash/itching as well as other potential immune mediated toxicities for which patient should notify office and/or seek medical attention (I.e. major diarrhea and abdominal discomfort for which imodium is not resolving, cough and shortness of breath, skin yellowing).     Management techniques of these side effects were discussed.  Written materials were provided including drug-specific side effect handouts.  Prescriptions for supportive care/prophylaxis (if applicable) medications were also discussed in terms of use and potential side effects.    2.  Home Medications: Reviewed patients documented home medications. Drug interactions identified between chemotherapy and patient's home medications: None  All questions were answered.  Patient/family verbalized understanding of the plan of care and management of side effects.  Spent approximately 15 minutes coordinating care and patient interaction.  Will follow-up as necessary.    Thank you for consulting [facility] Oncology Pharmacy Team.   Please don't hesitate to reach out for further questions regarding this patient.     Ruth Maria, DuaneD

## 2024-03-29 NOTE — PROGRESS NOTES
PT arrived with wife and son for a new pt consultation. Per MD PT to start treatment on  April 10, 2024. Infusion appt made and PT provided with date/time/and location. PT prefers treatment at [facility]. Labs and Guardant drawn today. Medications, pharmacy preference, allergies, nutritional status, and ECOG were reviewed with patient and updated in the medical record.  Patient verbalized understanding and agreement regarding discussed information via verbal feedback.

## 2024-04-03 NOTE — PROGRESS NOTES
Unable to reach patient for call back after patient's follow up appointment with PCP.   FREDDIEM with call back number for patient to call if needed   If no voicemail available call attempts x 2 were made to contact the patient to assist with any questions or concerns patient may have.

## 2024-04-26 NOTE — TELEPHONE ENCOUNTER
Spoke with son, he states for the last 1.5 weeks Mu has been vomiting almost every time he tries to eat or drink something. States he wants to eat but it comes right back up when he does. Able to tolerate 1/2 cup coffee in the AM, then has difficulty with fluids later in the day as well.  States he is taking anti-emetic (compazine listed in EMR) and it is not really helping. He does not complain of nausea, pain or any other symptoms.  Attempted to shanita patient and spouse for more details, message left for them to call office

## 2024-04-26 NOTE — TELEPHONE ENCOUNTER
Mu's wife left V/M on trige line and said call back with no other message. I called Vani back to get more information. I called both numbers and couldn't reach patient but did leave a V/M on cell to call nurse triage line back to address her concerns.

## 2024-05-03 NOTE — PROGRESS NOTES
PT seen in clinic for a FUV with Dr. Red. Scans ordered from last visit still unscheduled; appointment made for 5/7/24. Medications, pharmacy preference, allergies, nutritional status, and ECOG were reviewed with patient and updated in the medical record.  PT's vitals. Plan updated with PT, wife, and son.

## 2024-05-03 NOTE — PROGRESS NOTES
Patient ID: Mu Elise is a 78 y.o. male.  Date of Service: 05/03/24  Referring Physician: Torsten Red MD  [address]  [place] [id number]  Primary Care Provider: Geovani Griggs DO      Subjective    Oncology History   Hepatocellular carcinoma (Multi)   3/22/2024 Initial Diagnosis    Hepatocellular carcinoma (CMS/HCC)    History of alcohol-related cirrhosis and varices requiring banding in 2017.  Presented with nausea, abdominal distention and pain.  Imaging studies on 3/22/2024 demonstrated cirrhosis, portal hypertension with esophageal and gastric varices.  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 MRI of the abdomen demonstrated 15 cm conglomerate of centrally necrotic masses with tumor thrombus extending into right portal vein with extension into the left and main portal vein. Ascites was seen. This was considered LI-RADS LR-TIV.  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  He tolerated therapy reasonably well.  He had nausea which is now improved with Zofran.    Review of Systems   Constitutional:  Positive for appetite change and fatigue. Negative for chills and fever.   HENT:  Negative.     Eyes: Negative.    Respiratory: Negative.     Cardiovascular: Negative.    Gastrointestinal:  Positive for abdominal distention. Negative for nausea and vomiting.   Endocrine: Negative.    Genitourinary: Negative.     Musculoskeletal: Negative.    Skin: Negative.    Neurological: Negative.    Hematological: Negative.    Psychiatric/Behavioral: Negative.     All other systems reviewed and are negative.         Patient Active Problem List   Diagnosis    Actinic keratoses    Essential hypertension     Hyperlipidemia    Hyperuricemia    Hypothyroidism    Stage 2 chronic kidney disease    Need for influenza vaccination    Type 2 diabetes mellitus with microalbuminuria, with long-term current use of insulin (Multi)    History of liver cancer    Benign prostatic hyperplasia with urinary obstruction    Hypothyroidism due to acquired atrophy of thyroid    Neuropathic bladder    Polyp of colon    Vitamin D deficiency    Liver lesion    Hepatocellular carcinoma (Multi)     Past Medical History:   Diagnosis Date    Personal history of malignant neoplasm of liver 04/15/2021    History of liver cancer    Personal history of other diseases of the musculoskeletal system and connective tissue 07/31/2019    History of gout     Past Surgical History:   Procedure Laterality Date    PROSTATE SURGERY       Family History   Problem Relation Name Age of Onset    Other (family history of hepatic cirrhosis) Mother      Other (Family history of liver cancer) Mother      Other (Family history of lung disease) Father      Other (Family history of neoplasm of brain) Sister      Other (Family history of malignant neoplasm of prostate) Brother         Current Outpatient Medications:     acetaminophen (Tylenol) 500 mg tablet, Take 2 tablets (1,000 mg) by mouth every 6 hours., Disp: , Rfl:     allopurinol (Zyloprim) 100 mg tablet, Take 1 tablet (100 mg) by mouth once daily., Disp: 90 tablet, Rfl: 3    aspirin 81 mg EC tablet, Take 1 tablet (81 mg) by mouth once daily., Disp: , Rfl:     cholecalciferol (Vitamin D-3) 50 mcg (2,000 unit) capsule, Take 1 capsule (50 mcg) by mouth early in the morning.., Disp: , Rfl:     empagliflozin (Jardiance) 10 mg, Take 1 tablet (10 mg) by mouth once daily. (Patient taking differently: Take 1 tablet (10 mg) by mouth 3 times a week. Monday, Wednesday & Friday), Disp: 90 tablet, Rfl: 3    finasteride (Proscar) 5 mg tablet, Take 1 tablet (5 mg) by mouth once daily., Disp: 90 tablet, Rfl: 3    furosemide  (Lasix) 20 mg tablet, Take 1 tablet (20 mg) by mouth once daily., Disp: 90 tablet, Rfl: 3    levothyroxine (Synthroid, Levoxyl) 50 mcg tablet, Take 1 tablet (50 mcg) by mouth once daily in the morning. Take before meals., Disp: 90 tablet, Rfl: 3    ondansetron (Zofran) 8 mg tablet, Take 1 tablet (8 mg) by mouth every 8 hours if needed for vomiting or nausea. May take up to 3 times a day before meals until symptoms improve., Disp: 60 tablet, Rfl: 1    oxyCODONE (Roxicodone) 5 mg immediate release tablet, Take 1 tablet (5 mg) by mouth every 6 hours if needed for moderate pain (4 - 6)., Disp: 90 tablet, Rfl: 0    pramipexole (Mirapex) 0.125 mg tablet, Take 1 tablet (0.125 mg) by mouth once daily at bedtime., Disp: 90 tablet, Rfl: 3    prochlorperazine (Compazine) 10 mg tablet, Take 1 tablet (10 mg) by mouth every 6 hours if needed for nausea or vomiting., Disp: 30 tablet, Rfl: 5    rosuvastatin (Crestor) 5 mg tablet, Take 1 tablet (5 mg) by mouth once daily., Disp: 90 tablet, Rfl: 3    sennosides (Senokot) 8.6 mg tablet, Take 2 tablets (17.2 mg) by mouth every 12 hours., Disp: , Rfl:     spironolactone (Aldactone) 50 mg tablet, Take 1 tablet (50 mg) by mouth once daily., Disp: 90 tablet, Rfl: 3      Objective   BSA: There is no height or weight on file to calculate BSA.  /63   Pulse 83   Temp 36.8 °C (98.2 °F)   Resp 18   SpO2 97%     Performance Status:  Symptomatic; fully ambulatory    Physical Exam  Vitals reviewed.   Constitutional:       General: He is not in acute distress.     Appearance: Normal appearance.   HENT:      Head: Normocephalic and atraumatic.   Eyes:      Extraocular Movements: Extraocular movements intact.      Pupils: Pupils are equal, round, and reactive to light.   Cardiovascular:      Rate and Rhythm: Normal rate and regular rhythm.   Pulmonary:      Effort: Pulmonary effort is normal.      Breath sounds: Normal breath sounds.   Abdominal:      General: Bowel sounds are normal.       Tenderness: There is no abdominal tenderness.   Musculoskeletal:         General: Normal range of motion.   Neurological:      General: No focal deficit present.      Mental Status: He is alert and oriented to person, place, and time.   Psychiatric:         Behavior: Behavior normal.         === 03/22/24 ===    CT ABDOMEN PELVIS W IV CONTRAST    - Impression -  1.  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 See discussion  above.  2. The prostate is somewhat enlarged and the bladder is somewhat  distended and mildly thick-walled.  3. Multifocal infiltrating neoplasm or metastatic lesions in the  liver.      MACRO:  None    Signed by: Joseph Schoenberger 3/22/2024 8:07 AM  Dictation workstation:   [id number]    Assessment/Plan       #1 multifocal HCC with vascular invasion  #2 alcohol-related cirrhosis     Patient is a 78 y.o. male with alcohol-related cirrhosis who presented with multifocal HCC with vascular invasion.  There is involvement of main portal vein.  She did have extensive varices which required banding in 2017.  His Child-Hudson score is B8.     I discussed in detail with the patient the natural history and prognosis of HCC and available treatment options.  We would recommend systemic therapeutic options.  Treatment is palliative and not curative in nature.  In terms of first-line treatment options involve atezolizumab plus bevacizumab, tremelimumab plus durvalumab, sorafenib and lenvatinib.  With extensive viruses he is not a great candidate for bevacizumab.  After prolonged discussion patient decided to receive tremelimumab plus durvalumab.      Patient started treatment with tremelimumab plus durvalumab.  He received 1 cycle.  He  tolerated the therapy reasonably well.  He will receive his second cycle next week.  We will order restaging scans prior to next cycle.  I discussed the role of supportive care management.  I did order nutrition consult          Plan was discussed in detail with the patient and patient was in agreement with the plan of care.           Torsten Red MD

## 2024-05-08 PROBLEM — N17.9 AKI (ACUTE KIDNEY INJURY) (CMS-HCC): Status: ACTIVE | Noted: 2024-01-01

## 2024-05-08 NOTE — PROGRESS NOTES
"NUTRITION Assessment NOTE    Nutrition Assessment     Reason for Visit:  Mu Elise is a 78 y.o. male who presents for infusion of atezolizumab and bevacizumab for diagnosis of HCC.  Consult received as pt reported poor appetite, had not eaten in 5 days, constipation, nausea and vomiting.      After visit noted pt was going to the ED.     Lab Results   Component Value Date/Time    GLUCOSE 95 05/08/2024 1140     (L) 05/08/2024 1140    K 4.3 05/08/2024 1140    CL 89 (L) 05/08/2024 1140    CO2 26 05/08/2024 1140    ANIONGAP 15 05/08/2024 1140    BUN 33 (H) 05/08/2024 1140    CREATININE 1.64 (H) 05/08/2024 1140    EGFR 42 (L) 05/08/2024 1140    CALCIUM 9.6 05/08/2024 1140    ALBUMIN 3.1 (L) 05/08/2024 1140    ALKPHOS 261 (H) 05/08/2024 1140    PROT 7.5 05/08/2024 1140     (H) 05/08/2024 1140    BILITOT 4.1 (H) 05/08/2024 1140    ALT 22 05/08/2024 1140     No results found for: \"VITD25\"    Anthropometrics:  Anthropometrics  Weight: 71.3 kg (157 lb 3 oz) (noted weight was with shoes on; noted weight after pt left here and in the ED was 68kg)  BMI (Calculated): 22.55  IBW/kg (Dietitian Calculated): 75.45 kg  Percent of IBW: 94 %  Weight Change  Weight History / % Weight Change: 9% weight loss in 1 month  Significant Weight Loss: Yes  Interpretation of Weight Loss: >5% in 1 month        Wt Readings from Last 10 Encounters:   05/08/24 68 kg (150 lb)   05/08/24 71.3 kg (157 lb 3 oz)   05/08/24 71.3 kg (157 lb 3 oz)   04/10/24 74.8 kg (164 lb 12.8 oz)   03/29/24 77.3 kg (170 lb 6.7 oz)   03/28/24 76.7 kg (169 lb)   03/22/24 81.6 kg (180 lb)   03/11/24 76.2 kg (168 lb)   01/13/24 77.6 kg (171 lb)   10/12/23 80.6 kg (177 lb 9.6 oz)        Food And Nutrient Intake:  Food and Nutrient History  Food and Nutrient History: poor  Energy Intake: Poor < 50 %  Fluid Intake: reported still drinking a lot of water  GI Symptoms: nausea, vomiting, constipation  GI Symptoms greater than 2 weeks: intermittent  Dentition: ill " fitting dentures     Food Intake  Amount of Food: reported had not eaten in 5 days; tried to take bites but felt nauseated. Has Boost at home, unable to drink due to nausea and reported it would make him vomit.                                            Food Supplement Intake  Oral Nutrition Supplements: Boost (has boost at home, unable to tolerate at this time.)           Nutrition Focused Physical Exam Findings:      Subcutaneous Fat Loss  Orbital Fat Pads: Severe (dark circles, hollowing and loose skin)  Buccal Fat Pads: Severe (hollow, sunken and narrow face)  Triceps: Severe (negligible fat tissue)    Muscle Wasting  Temporalis: Severe (hollowed scooping depression)  Deltoid/Trapezius: Severe (squared shoulders, acromion process prominent)    Edema  Edema: ascites         Energy Needs  Estimated Energy Needs  Total Energy Estimated Needs (kCal): 2000 kCal  Total Estimated Energy Need per Day (kCal/kg): 30 kCal/kg  Estimated Fluid Needs  Total Fluid Estimated Needs (mL): 2000 mL  Total Fluid Estimated Needs (mL/kg): 30 mL/kg  Estimated Protein Needs  Total Protein Estimated Needs (g): 81 g  Total Protein Estimated Needs (g/kg): 1.2 g/kg        Nutrition Diagnosis   Malnutrition Diagnosis  Patient has Malnutrition Diagnosis: Yes  Diagnosis Status: New  Malnutrition Diagnosis: Severe malnutrition related to chronic disease or condition  As Evidenced by: wt loss of > 5% in the last month and < 75% of estimated nutritional needs    Nutrition Diagnosis  Patient has Nutrition Diagnosis: Yes  Diagnosis Status (1): New  Nutrition Diagnosis 1: Inadequate protein energy intake  Related to (1): nausea, lack of appetite, vomiting, constipation  As Evidenced by (1): pt reported not eating in the last 5 days, and weight loss of 15 lbs ( 9%) in the last month    Nutrition Interventions/Recommendations   Nutrition Prescription       Food and Nutrition Delivery  Food and Nutrition Delivery  Meals & Snacks: Modify schedule of  foods/fluids  Goals: Smaller frequent meals including protein rich foods, CHO rich foods  Medical Food Supplement: Boost Plus  Goals: Boost plus or comparable product 2-3 per day between meals as tolerated.    Nutrition Education       Coordination of Care   Will monitor after pt gets discharged from ED/Hospital and once comes back in for cancer treatments.       Nutrition Monitoring and Evaluation    Will monitor po intake, wt, labs throughout cancer treatments.        Time Spent  Prep time on day of patient encounter: 10 minutes  Time spent directly with patient, family or caregiver: 15 minutes  Additional Time Spent on Patient Care Activities: 5 minutes  Documentation Time: 10 minutes  Other Time Spent: 0 minutes  Total: 40 minutes

## 2024-05-08 NOTE — Clinical Note
6500 mL yellow ascitic fluid drained from abdomen. Specimens collected and taken to lab as ordered. Site to right side of abdomen dressed with gauze, tegaderm. No signs of bleeding or hematoma. Pt tolerated procedure well. Pt to return to room 3023 for recovery and ongoing hospitalization.

## 2024-05-08 NOTE — PROGRESS NOTES
Patient here today for second dose of Durvalumab. Patient reports he has not eaten in 5 days and is peeing blood. Bili 4.1, creat 1.64, ast 142, sodium 126. MD notified. Treatment held and patient transported to ED for further evaluation

## 2024-05-08 NOTE — ED PROVIDER NOTES
HPI   Chief Complaint   Patient presents with    Abnormal Labs       Patient is a 78-year-old male with history of HTN, HLD, CKD 2, T2DM, BPH, hepatocellular carcinoma presenting to [facility] ED from [facility] for abnormal labs.  On intake at [facility] center, patient found to have abnormal labs.  Patient had bilirubin of 4.1, creatinine of 1.64, , sodium 126.  Patient is also significantly decreased appetite for the last several weeks.  Patient also describes blood in the urine.  Patient denies any active chest pain, shortness of breath, nausea, vomiting, diarrhea, abdominal pain, constipation, fever, chills, new weakness.                          [place] Coma Scale Score: 15                     Patient History   Past Medical History:   Diagnosis Date    Personal history of malignant neoplasm of liver 04/15/2021    History of liver cancer    Personal history of other diseases of the musculoskeletal system and connective tissue 07/31/2019    History of gout     Past Surgical History:   Procedure Laterality Date    PROSTATE SURGERY       Family History   Problem Relation Name Age of Onset    Other (family history of hepatic cirrhosis) Mother      Other (Family history of liver cancer) Mother      Other (Family history of lung disease) Father      Other (Family history of neoplasm of brain) Sister      Other (Family history of malignant neoplasm of prostate) Brother       Social History     Tobacco Use    Smoking status: Former     Types: Cigarettes    Smokeless tobacco: Never   Vaping Use    Vaping status: Never Used   Substance Use Topics    Alcohol use: Not Currently    Drug use: Never       Physical Exam   ED Triage Vitals [05/08/24 1308]   Temperature Heart Rate Respirations BP   36.3 °C (97.3 °F) 70 18 117/58      Pulse Ox Temp Source Heart Rate Source Patient Position   99 % Tympanic Monitor Sitting      BP Location FiO2 (%)     Right arm --       Physical Exam  Constitutional:        Appearance: Normal appearance. He is normal weight.   HENT:      Head: Normocephalic and atraumatic.      Nose: Nose normal.      Mouth/Throat:      Mouth: Mucous membranes are moist.      Pharynx: Oropharynx is clear.   Eyes:      Extraocular Movements: Extraocular movements intact.      Conjunctiva/sclera: Conjunctivae normal.      Pupils: Pupils are equal, round, and reactive to light.   Cardiovascular:      Rate and Rhythm: Normal rate and regular rhythm.      Pulses: Normal pulses.      Heart sounds: Normal heart sounds.   Pulmonary:      Effort: Pulmonary effort is normal.      Breath sounds: Normal breath sounds.   Abdominal:      General: Abdomen is flat. Bowel sounds are normal. There is distension.      Palpations: Abdomen is soft.      Tenderness: There is abdominal tenderness. There is no guarding or rebound.   Musculoskeletal:         General: Normal range of motion.      Cervical back: Normal range of motion and neck supple.   Skin:     General: Skin is warm and dry.      Capillary Refill: Capillary refill takes less than 2 seconds.   Neurological:      General: No focal deficit present.      Mental Status: He is alert and oriented to person, place, and time. Mental status is at baseline.   Psychiatric:         Mood and Affect: Mood normal.         Behavior: Behavior normal.         ED Course & MDM        Medical Decision Making  Patient is a 78 y.o. male who presents to [facility] ED for Abnormal Labs. On initial ED evaluation, patient found to be in no acute distress. Per HPI, concern to evaluate and treat for possible acute intra-abdominal pathology in the context of hepatocellular carcinoma.  Obtaining CBC, CMP, CT abdomen pelvis with IV contrast.  Bolusing 1 L normal saline fluids for initial hyponatremia 126 on prehospital labs.  CT abdomen/pelvis with IV contrast showed:    1.  There is a moderate volume of ascites. It is somewhat increased  from the prior.  2. The appearance of cirrhosis and  extensive opacity are carcinoma  similar to the prior. 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 The divisions of the left portal vein do  opacify normally.  3. There appears to be some more hypoattenuating bland thrombus in  the main portal vein. Additionally hypoenhancement of the proximal  superior mesenteric vein and adjacent jejunal branches noted.  Developing thrombosis is a consideration.  4. Enlarged prostate with thickened bladder wall.      No acute findings on CT requiring surgical intervention.  Patient admitted to general medicine service for hyponatremia and NUNO.        Procedure  Procedures     Suellen Mack MD  Resident  05/08/24 1639       Suellen Mack MD  Resident  05/08/24 1649

## 2024-05-08 NOTE — H&P
History Of Present Illness  Mu Elise is a 78 y.o. male presenting with hematuria, transaminitis, NUNO.  The patient was at his outpatient oncology office and sent in for abn labs--> it was discovered that he had transaminitis, NUNO and hematuria.  He was due for cycle 2 of treatment for HCC 5/8.  has been functionally declining for months, and has lost about 40 pounds in last 2 months. Is unable to eat or drink anything due to feeling of fullness as well as n/v. otc n/v meds at home are not working. Also endorses nonbloody vomiting though feels might have vomited up a band a few weeks ago. Also endorses BRBPR and melena over last few weeks w/ small bowel movements every couple days, not consistently w/ blood. Also endorses 1 year of hematuria w/out gross clots.      was in his usual state of health until presented to [facility] on 3/22/24 for progressive abd distention and abd pain w/ n/v. Found to have concerning liver lesions for HCC on CT as well as PV thrombosis, GI saw patient at this time. In PCP notes, ultrasound of the abdomen/liver in October, 2023 indicated for history of liver cancer (?)showed abnormal heterogeneous hyperechoic hepatic lesion  in the right liver lobe measuring 7.6 x 7 x 6.1 cm. elevated alpha-fetoprotein to 749. During this admission, CT abdomen pelvis with IV contrast done today shows cirrhosis with portal hypertension, splenomegaly, small volume ascites, esophageal and gastric varices, thrombus in the main portal vein extending into the right portal vein branch, cavernous transformation resulting in reconstitution of the left branch of the portal vein.  Multifocal infiltrating neoplasm or metastatic lesions in the liver. Per GI recs at [facility] [place], abd pain related to ongoing liver process, likely malignancy, portal vein thrombosis with distal reconstitution; would not anticoagulate at this time given high risk varices at least until surveillance EGD able to be done; cont with DVT  Ppx, will need surveillance EGD at some point given varices on CT.     He does not have an established hepatologist or GI doc currently though saw Dr. Rodriguez at [facility] back in 2017. Has not had egd since 2017. Was told his cirrhosis is likely alcohol related as was a heavy beer drinker for 25+ years, he stopped drinking once told he had cirrhosis when presented for upper GIB and found to have esophageal varices in 2017.    EGD:  03/01/2017 indicated for follow-up esophageal varices  -Nonbleeding grade 2 esophageal varices, banded  -Erythematous mucosa in the gastric body  -Normal examined duodenum-  -No specimens collected     Colonoscopy 11/21/2017  - A 12 mm polyp in the cecum, status post resection.  Inflammatory/hyperplastic polyp  - A 10 mm polyp in the sigmoid colon, status post resection.  Tubular adenoma with moderate inflammation and lymphoid cell aggregates  - Three 4-5 mm polyps in the rectum.  Hyperplastic/inflammatory polyp with moderately severe inflammation in the lamina propria  - Small 15 mm polyp in the transverse colon, lipoma.  - Internal hemorrhoids  - repeat colonoscopy in 3 years for surveillance, due 2020.    ED course:  VS- afebrile, HR 70s, /58, repeat 103/55, satting 985 Room air  Labs- CBC without leukocytosis, Hgb 12.6 (baseline 13-14), plt 245. CMP remarkable for sodium 126 (baseline low 130s), K 3.9, BUN/SCr 33/1.64 (baseline 1.0-1.1), alk phos 244 (baseline 300-400s),  (baseline 40-60s), ALT 24, Tbili 4.3 (baseline 1.5-2).  Imaging- CT A&P w/ contrast remarkable for moderate volume ascites worsened from prior, cirrhosis and progression of tumor thrombus in RPV, slightly progressed from prior and now extends up LPV. Also more bland thrombus in MPV w/ hypoattenuation adjacent jejunal branches and superior mesenteric vein. Could be a thrombosis  Interventions: 1L NS     Past Medical History  He has a past medical history of Personal history of malignant neoplasm of  liver (04/15/2021) and Personal history of other diseases of the musculoskeletal system and connective tissue (07/31/2019).    Surgical History  He has a past surgical history that includes Prostate surgery.     Social History  He reports that he has quit smoking. His smoking use included cigarettes. He has never used smokeless tobacco. He reports that he does not currently use alcohol. He reports that he does not use drugs.    Family History  Family History   Problem Relation Name Age of Onset    Other (family history of hepatic cirrhosis) Mother      Other (Family history of liver cancer) Mother      Other (Family history of lung disease) Father      Other (Family history of neoplasm of brain) Sister      Other (Family history of malignant neoplasm of prostate) Brother          Allergies  Lisinopril    Review of Systems   Constitutional: Negative.    HENT: Negative.     Eyes: Negative.    Respiratory: Negative.     Cardiovascular: Negative.    Gastrointestinal:  Positive for abdominal distention.   Endocrine: Negative.    Genitourinary:  Positive for hematuria.   Allergic/Immunologic: Negative.    Neurological: Negative.    Hematological: Negative.         Physical Exam  Constitutional:       Appearance: Normal appearance. He is ill-appearing.   HENT:      Head: Normocephalic.      Nose: Nose normal.      Mouth/Throat:      Mouth: Mucous membranes are moist.      Pharynx: Oropharynx is clear.   Eyes:      General: Scleral icterus present.      Pupils: Pupils are equal, round, and reactive to light.   Cardiovascular:      Rate and Rhythm: Normal rate and regular rhythm.      Pulses: Normal pulses.      Heart sounds: Normal heart sounds.   Pulmonary:      Effort: Pulmonary effort is normal.      Breath sounds: Normal breath sounds.   Abdominal:      General: Bowel sounds are normal. There is distension.      Tenderness: There is abdominal tenderness.      Comments: +fluid wave, tense ascites   Musculoskeletal:          General: Normal range of motion.      Cervical back: Normal range of motion.      Right lower leg: Edema present.      Left lower leg: Edema present.   Skin:     General: Skin is warm.      Capillary Refill: Capillary refill takes less than 2 seconds.   Neurological:      General: No focal deficit present.      Mental Status: He is alert and oriented to person, place, and time.   Psychiatric:         Mood and Affect: Mood normal.         Behavior: Behavior normal.          Last Recorded Vitals  /55   Pulse 74   Temp 36.3 °C (97.3 °F) (Tympanic)   Resp 18   Wt 68 kg (150 lb)   SpO2 98%     Relevant Results  Results for orders placed or performed during the hospital encounter of 05/08/24 (from the past 24 hour(s))   CBC and Auto Differential   Result Value Ref Range    WBC 6.6 4.4 - 11.3 x10*3/uL    nRBC 0.0 0.0 - 0.0 /100 WBCs    RBC 4.64 4.50 - 5.90 x10*6/uL    Hemoglobin 12.6 (L) 13.5 - 17.5 g/dL    Hematocrit 38.2 (L) 41.0 - 52.0 %    MCV 82 80 - 100 fL    MCH 27.2 26.0 - 34.0 pg    MCHC 33.0 32.0 - 36.0 g/dL    RDW 17.3 (H) 11.5 - 14.5 %    Platelets 245 150 - 450 x10*3/uL    Neutrophils % 78.9 40.0 - 80.0 %    Immature Granulocytes %, Automated 0.2 0.0 - 0.9 %    Lymphocytes % 8.8 13.0 - 44.0 %    Monocytes % 10.0 2.0 - 10.0 %    Eosinophils % 1.2 0.0 - 6.0 %    Basophils % 0.9 0.0 - 2.0 %    Neutrophils Absolute 5.19 1.60 - 5.50 x10*3/uL    Immature Granulocytes Absolute, Automated 0.01 0.00 - 0.50 x10*3/uL    Lymphocytes Absolute 0.58 (L) 0.80 - 3.00 x10*3/uL    Monocytes Absolute 0.66 0.05 - 0.80 x10*3/uL    Eosinophils Absolute 0.08 0.00 - 0.40 x10*3/uL    Basophils Absolute 0.06 0.00 - 0.10 x10*3/uL   Comprehensive metabolic panel   Result Value Ref Range    Glucose 95 74 - 99 mg/dL    Sodium 128 (L) 136 - 145 mmol/L    Potassium 3.9 3.5 - 5.3 mmol/L    Chloride 90 (L) 98 - 107 mmol/L    Bicarbonate 25 21 - 32 mmol/L    Anion Gap 17 10 - 20 mmol/L    Urea Nitrogen 33 (H) 6 - 23 mg/dL     Creatinine 1.64 (H) 0.50 - 1.30 mg/dL    eGFR 42 (L) >60 mL/min/1.73m*2    Calcium 9.9 8.6 - 10.3 mg/dL    Albumin 3.2 (L) 3.4 - 5.0 g/dL    Alkaline Phosphatase 244 (H) 33 - 136 U/L    Total Protein 7.3 6.4 - 8.2 g/dL     (H) 9 - 39 U/L    Bilirubin, Total 4.3 (H) 0.0 - 1.2 mg/dL    ALT 24 10 - 52 U/L   Bilirubin, Direct   Result Value Ref Range    Bilirubin, Direct 2.2 (H) 0.0 - 0.3 mg/dL   Osmolality   Result Value Ref Range    Osmolality, Serum 281 280 - 300 mOsm/kg   Hemoglobin A1c   Result Value Ref Range    Hemoglobin A1C 6.0 (H) see below %    Estimated Average Glucose 126 Not Established mg/dL   APTT   Result Value Ref Range    aPTT 35 27 - 38 seconds   Type and Screen   Result Value Ref Range    ABO TYPE O     Rh TYPE POS     ANTIBODY SCREEN NEG    Acute Toxicology Panel, Blood   Result Value Ref Range    Acetaminophen <10.0 10.0 - 30.0 ug/mL    Salicylate  <3 4 - 20 mg/dL    Alcohol <10 <=10 mg/dL   Lactate   Result Value Ref Range    Lactate 1.5 0.4 - 2.0 mmol/L   Protime-INR   Result Value Ref Range    Protime 16.4 (H) 9.8 - 12.8 seconds    INR 1.5 (H) 0.9 - 1.1   Gray Top   Result Value Ref Range    Extra Tube Hold for add-ons.    Lavender Top   Result Value Ref Range    Extra Tube Hold for add-ons.    Urinalysis with Reflex Microscopic   Result Value Ref Range    Color, Urine Dark-Yellow Light-Yellow, Yellow, Dark-Yellow    Appearance, Urine Turbid (N) Clear    Specific Gravity, Urine 1.043 (N) 1.005 - 1.035    pH, Urine 5.5 5.0, 5.5, 6.0, 6.5, 7.0, 7.5, 8.0    Protein, Urine 30 (1+) (A) NEGATIVE, 10 (TRACE), 20 (TRACE) mg/dL    Glucose, Urine Normal Normal mg/dL    Blood, Urine 1.0 (3+) (A) NEGATIVE    Ketones, Urine TRACE (A) NEGATIVE mg/dL    Bilirubin, Urine 0.5 (1+) (A) NEGATIVE    Urobilinogen, Urine 4 (2+) (A) Normal mg/dL    Nitrite, Urine NEGATIVE NEGATIVE    Leukocyte Esterase, Urine 75 Kim/µL (A) NEGATIVE   Microscopic Only, Urine   Result Value Ref Range    WBC, Urine >50 (A) 1-5,  NONE /HPF    RBC, Urine >20 (A) NONE, 1-2, 3-5 /HPF    Bacteria, Urine 4+ (A) NONE SEEN /HPF    Mucus, Urine FEW Reference range not established. /LPF    Hyaline Casts, Urine 3+ (A) NONE /LPF   CBC   Result Value Ref Range    WBC 7.1 4.4 - 11.3 x10*3/uL    nRBC 0.0 0.0 - 0.0 /100 WBCs    RBC 4.64 4.50 - 5.90 x10*6/uL    Hemoglobin 12.6 (L) 13.5 - 17.5 g/dL    Hematocrit 38.3 (L) 41.0 - 52.0 %    MCV 83 80 - 100 fL    MCH 27.2 26.0 - 34.0 pg    MCHC 32.9 32.0 - 36.0 g/dL    RDW 17.8 (H) 11.5 - 14.5 %    Platelets 260 150 - 450 x10*3/uL   Protime-INR   Result Value Ref Range    Protime 16.4 (H) 9.8 - 12.8 seconds    INR 1.5 (H) 0.9 - 1.1     CT abdomen pelvis w IV contrast    Result Date: 5/8/2024  Interpreted By:  Schoenberger, Joseph, STUDY: CT ABDOMEN PELVIS W IV CONTRAST;  5/8/2024 3:15 pm   INDICATION: Signs/Symptoms:Distended abd, hepatocellular carcinoma.   COMPARISON: 03/22/2024   ACCESSION NUMBER(S): [id number]   ORDERING CLINICIAN: YOLIE HOLM   TECHNIQUE: CT of the abdomen and pelvis was performed.  Standard contiguous axial images were obtained at 3 mm slice thickness through the abdomen and pelvis. Coronal and sagittal reconstructions at 3 mm slice thickness were performed.   75 ml of contrast Omnipaque 350 the the were administered intravenously without immediate complication.   FINDINGS: LOWER CHEST: There is bilateral male gynecomastia. Otherwise unremarkable.   ABDOMEN:   LIVER: The liver is small and very nodular in contour indicating cirrhosis unchanged. There is heterogeneous low attenuation infiltrating mass in the right lobe in keeping with the known history of metastatic carcinoma. This is unchanged from prior. This appears to extend into the right division of the portal vein indicating tumor thrombus. This may have progressed from the prior as there is some extension at this point into the proximal left portal vein. There is extension into the main portal vein.   BILE DUCTS: No dilation    GALLBLADDER: Tiny calcified gallstones. No wall thickening or pericholecystic inflammatory findings   PANCREAS: Within normal limits.   SPLEEN: Within normal limits.   ADRENAL GLANDS: Bilateral adrenal glands appear normal.   KIDNEYS AND URETERS: The kidneys are normal in size and enhance symmetrically.  Within normal limits.   PELVIS:   BLADDER: Circumferential wall thickening   REPRODUCTIVE ORGANS: Enlarged prostate   BOWEL: The stomach is unremarkable.   The small and large bowel are normal in caliber and demonstrate no wall thickening.   Normal appendix.   VESSELS: There is no aneurysmal dilatation of the abdominal aorta. The IVC appears normal. Note is made of extent expansile tumor thrombus in the right portal vein extending to the main portal vein and up into the proximal left portal vein. More distally the left portal vein and branches of opacifies normally. The the right portal venous branches do not opacify on this exam. Just inferior to the tumor thrombus in the main portal vein there appears to be more hypoattenuating bland thrombus there is a localized hypo attenuation otherwise opacified superior mesenteric vein specifically associated with the region of the jejunal veins and developing SMV thrombosis is a consideration.   PERITONEUM/RETROPERITONEUM/LYMPH NODES: There is a moderate volume of ascites. The amount of ascites has increased compared to the prior. Ascites extends caudally on the right into an inguinal hernia. There is no pathologic enlargement of abdominal or pelvic lymph nodes.   BONES AND ABDOMINAL WALL: No suspicious osseous lesions are identified. Degenerative discogenic disease is noted in the lower thoracic and lumbar spine.  Right inguinal hernia unchanged.       1.  There is a moderate volume of ascites. It is somewhat increased from the prior. 2. The appearance of cirrhosis and extensive opacity are carcinoma similar to the prior. However the amount of tumor thrombus in the right  portal vein may have progressed slightly and now extends up of the left portal vein. The divisions of the left portal vein do opacify normally. 3. There appears to be some more hypoattenuating bland thrombus in the main portal vein. Additionally hypoenhancement of the proximal superior mesenteric vein and adjacent jejunal branches noted. Developing thrombosis is a consideration. 4. Enlarged prostate with thickened bladder wall.     MACRO: None   Signed by: Joseph Schoenberger 5/8/2024 3:31 PM Dictation workstation:   [id number]    CT chest wo IV contrast    Result Date: 5/7/2024  Interpreted By:  Shane Herring, STUDY: CT CHEST WO IV CONTRAST;  5/7/2024 2:11 pm   INDICATION: 77 y/o   M with  Signs/Symptoms:HCC, initial staging.   LIMITATIONS: Imaging of the mediastinum and marlon is limited without the use of IV contrast..   ACCESSION NUMBER(S): [id number]   ORDERING CLINICIAN: FEDE MONTANA   TECHNIQUE: Noncontrast Thin-section images were obtained  from the thoracic inlet down through the diaphragm. Sagittal and coronal reconstruction images were generated. Mediastinal, lung, bone, and liver windows were reviewed.   COMPARISON: Correlation with CT scan abdomen and pelvis from 03/22/2024.   FINDINGS: CHEST WALL/BASE OF THE NECK: Rather prominent bilateral gynecomastia. No thyromegaly or thyroid mass.   LUNGS/ PLEURA/ AND TRACHEA: No mass or pneumonia in either lung. Punctate left lower lobe calcified granuloma on lung window image 230. No pleural effusion. No pneumothorax. The trachea was grossly intact.   MEDIASTINUM/MARLON: No suspicious mediastinal, hilar, or axillary mass or adenopathy in this unenhanced exam. No cardiomegaly. Moderate coronary artery calcifications involving left main, lad, and RCA vessels. No pericardial effusion. Mild aneurysmal dilatation of the ascending aorta measuring 42 mm in maximal diameter, tapering to normal in the mid arch level. Mild-to-moderate mural calcifications throughout the  thoracic aorta.   BONES: No destructive lytic or blastic bone lesion. Mild-to-moderate multilevel proximal to midthoracic spine endplate osteophytosis.   UPPER ABDOMEN: Liver contours are diffusely nodular and lobulated. There is heterogeneous decreased density throughout the right hepatic lobe. Mild stable cholelithiasis. There is progression of abdominal ascites in the upper abdomen along with mesenteric edema. There is splenomegaly with the spleen measuring at least 13.2 cm in length, not completely imaged. No adrenal mass or hydronephrosis. No pancreatic mass or inflammation.       Bilateral gynecomastia.   Mural calcifications in the thoracic aorta and coronary artery calcifications as described.   No suspicious thoracic adenopathy in this unenhanced exam. No suspicious mass or pneumonia in either lung.   Findings in the upper abdomen are consistent with the given clinical diagnosis as well as cirrhosis of the liver with portal hypertension, splenomegaly, and progression of upper abdominal ascites.   MACRO: None   Signed by: Shane Herring 5/7/2024 3:47 PM Dictation workstation:   [id number]     EGD:  03/01/2017 indicated for follow-up esophageal varices  -Nonbleeding grade 2 esophageal varices, banded  -Erythematous mucosa in the gastric body  -Normal examined duodenum  -No specimens collected     Colonoscopy 11/21/2017  - A 12 mm polyp in the cecum, status post resection.  Inflammatory/hyperplastic polyp  - A 10 mm polyp in the sigmoid colon, status post resection.  Tubular adenoma with moderate inflammation and lymphoid cell aggregates  - Three 4-5 mm polyps in the rectum.  Hyperplastic/inflammatory polyp with moderately severe inflammation in the lamina propria  - Small 15 mm polyp in the transverse colon, lipoma.  - Internal hemorrhoids  - repeat colonoscopy in 3 years for surveillance, due 2020.      Assessment/Plan   Active Problems:  There are no active Hospital Problems.    Mr. 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 He was due to cycle 2 of treatment for HCC 5/8. States has been functionally declining for months, and has lost about 40 pounds, has not eaten anything substantial in weeks due to feeling of fullness from abd distention and n/v as well as abd pain from his liver cancer. Also endorses BRBPR and melena over last few weeks w/ small bowel movements every couple days, not consistently w/ blood. Also endorses 1 year of hematuria w/out gross clots.    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  Hx of varices requiring banding 2017  Portal vein thrombosis with distal reconstitution - not on AC  Elevated alkaline phosphatase, transaminitis hyperbilirubinemia 2/2 cirrhosis +/- immunotherapy  Acute on chronic normocytic anemia  MELD 3.0: 26 at 5/9/2024  6:20 AM  MELD-Na: 27 at 5/9/2024  6:20 AM  Calculated from:  Serum Creatinine: 1.64 mg/dL at 5/8/2024  1:37 PM  Serum Sodium: 128 mmol/L at 5/8/2024  1:37 PM  Total Bilirubin: 4.3 mg/dL at 5/8/2024  1:37 PM  Serum Albumin: 3.2 g/dL at 5/8/2024  1:37 PM  INR(ratio): 1.5 at 5/9/2024  6:20 AM  Age at listing (hypothetical): 78 years  Sex: Male at 5/9/2024  6:20 AM    - Ascites: present, tense. Mild overload in LE  - Volume: appears intravascularly dry though with ascites and mild pedal edema BL. On home diuretics lasix 20 and spirinolactone 50mg daily  - EV: Hx of esophageal varices on EGD 2017 grade 2 varices, banded, PHG  - HE: none, not on home lactulose  - HCC: present,  currently on immunotherapy and established w/ Dr. Red  - Transplant: not a candidate given advanced HCC    Recommendations:   -Will hold off on AC given GI consult from danny's recs that is high risk given unknown state of varices  -Given pt reports of BRBPR, melena, abd pain and distention, will cover for upper GIB though suspicion is low w/ Rocephin 1g, octreotide and PPI BID  -GI consult for possible EGD, is NPO  -IR consult for dx and therapeutic tap on 5/9  -No indication for liver transplant eval given advanced HCC  -Abd pain is likely due to liver cancer, will continue his home oxycodone and hold tylenol  -Nutrition, PT/OT consulted to optimize high protein diet and functional status  -See ACP note completed 5/8 evening, pt prefers to focus on comfort if his quality of life deemed to be poor. I have placed consults to palliative and hospice (at least for informational meeting). I have also discussed high risk of mortality given child c and meld scores and CT imaging that does appear to have worsening size and spread of lesions. I did specifically state I am not sure his treatment options and he is not sure he would like to continue immunotherapy  -Scheduled zofran and reglan to optimize reduction of n/v. EKG pending for Qtc evaluation.   -Daily MELD labs, iron panel added to 5/8 labs  -Liver ultrasound w/ dopplers ordered  -Hgb and vitals have been stable, Daily CBCs, transfuse < 7      Nonoliguric, proteinuric NUNO on CKD2  Hx BPH  -Likely pre-renal given history of n/v, decreased oral intake over weeks  -Daily RFPs  -Bladder scan given Hx BPH  -Continue home proscar  -Rocephin to cover for mild suspicion of UTI    Chronic hyponatremia due to cirrhosis + hypovolemia  -Patient's baseline is approximately 134-132 is currently 128  -Daily RFPs  -s/p 2 L NS boluses    Chronic conditions:  HTN  HLD  restless leg syndrome  Hypothyroidism  [id number] (A1c 6.0%)  -Home allopurinol, Lasix, spironolactone held for  NUNO, aspirin held for bleeding, Crestor held for transaminitis, jardiance used for T2DM, currently held as sugars well controlled  -Home Mirapex continued for RLS, synthyroid resumed for hypothyroid  -Home n/v meds held due to IV use here  -Will hold off on sliding scale insulin given not eating  -home compazine held as states having hallucinations    IV fluids-patient is on normal saline  Diet-patient is on regular diet  CODE STATUS-DNR/DNI    Jean-Pierre Freeman MD PGY-1    Senior Resident Addendum:  Patient seen and examined independently of intern resident. The above documentation was reviewed by me and adjusted according to plan.    Assessment and plan to be discussed with attending.   Kelli Solorio, DO

## 2024-05-09 NOTE — PROGRESS NOTES
"Nutrition Initial Assessment:   Nutrition Assessment    Reason for Assessment: Admission nursing screening, Provider consult order    Patient History: Mu Elise is a 78 y.o. male presenting to ED with hematuria, transaminitis, and NUNO that was identified at his recent oncology appointment yesterday.  Pt had ascites on admit and recently return from IR procedure.  He had paracentesis with 6.5L removed.  Pt notes that he had to have this done one other time but it was 8 years ago.  He lives at home with his spouse.  Palliative care on consult.    Past Medical History: HTN, HLD, CKD 2, T2DM, BPH, hepatocellular carcinoma     Nutrition History:  Energy Intake: Poor < 50 %  Food and Nutrient History: Pt with very poor intakes. He had reported to oncology RD yesterday that he had not eaten in the past 5 days do to thinking he would get sick.  Explains that \"it might be psychologically, but I don't eat sometimes just because I think I'm going to get sick.\"  He denies having nausea vomiting since a new nausea med was started a week ago.  He verbalizes poor intakes \"over the whole winter\" and that prior to winter time he was not eating 3 meals daily.  He is aware of his weight loss and need to get nutrition, unwilling to trial Boost or Ensure has he notes he has not tolerated these in the past, however upon discussion pt willing to trial Nephera to see if he felt this could be an option for him at home.  Vitamin/Herbal Supplement Use: niacin supplement  Food Allergies/Intolerances:  None  GI Symptoms: Constipation, Nausea, Vomiting, and blood in stool  noted on admit, pt however states no nausea since nausea medication was started one week ago.  Oral Problems:  poor fitting dentures       Current Diet: Adult diet Regular    Anthropometrics:  Height: 177.8 cm (5' 10\")   Weight: 71.5 kg (157 lb 11.2 oz)   BMI (Calculated): 22.63             Weight Change %:  Weight History / % Weight Change: 7.5% weight loss within the " past 3 months  Significant Weight Loss: Yes  Interpretation of Weight Loss: 7.5% in 3 months  05/08/24 68 kg (150 lb)   05/08/24 71.3 kg (157 lb 3 oz)   05/08/24 71.3 kg (157 lb 3 oz)   04/10/24 74.8 kg (164 lb 12.8 oz)   03/29/24 77.3 kg (170 lb 6.7 oz)   03/28/24 76.7 kg (169 lb)   03/22/24 81.6 kg (180 lb)   03/11/24 76.2 kg (168 lb)   01/13/24 77.6 kg (171 lb)   10/12/23 80.6 kg (177 lb 9.6 oz)       Pain Assessment: 0-10  Pain Score: 0 - No pain  Pain Type: Acute pain  Pain Location: Abdomen  Pain Orientation: Lower      Nutrition Significant Labs:  BMP Trend:   Results from last 7 days   Lab Units 05/09/24  0620 05/08/24  1337 05/08/24  1140   GLUCOSE mg/dL 97 95 95   CALCIUM mg/dL 9.1 9.9 9.6   SODIUM mmol/L 128* 128* 126*   POTASSIUM mmol/L 4.0 3.9 4.3   CO2 mmol/L 21 25 26   CHLORIDE mmol/L 93* 90* 89*   BUN mg/dL 31* 33* 33*   CREATININE mg/dL 1.50* 1.64* 1.64*    , A1C:  Lab Results   Component Value Date    HGBA1C 6.0 (H) 05/08/2024       Nutrition Specific Medications:  Scheduled medications  [Held by provider] acetaminophen, 975 mg, oral, q6h  cholecalciferol, 2,000 Units, oral, Daily  finasteride, 5 mg, oral, Daily  levothyroxine, 50 mcg, oral, Daily before breakfast  metoclopramide, 10 mg, intravenous, q6h YUDELKA  ondansetron, 4 mg, intravenous, q4h  pramipexole, 0.125 mg, oral, Nightly  [Held by provider] rosuvastatin, 5 mg, oral, Daily  [Held by provider] spironolactone, 50 mg, oral, Daily      Continuous medications         Nutrition Focused Physical Exam Findings:  Subcutaneous Fat Loss:   Orbital Fat Pads: Severe (dark circles, hollowing and loose skin)  Buccal Fat Pads: Severe (hollow, sunken and narrow face)  Triceps: Severe (negligible fat tissue)  Ribs: Defer  Muscle Wasting:  Temporalis: Severe (hollowed scooping depression)  Deltoid/Trapezius: Severe (squared shoulders, acromion process prominent)  Edema:  Edema: ascites  Physical Findings:       I/O:   Last BM Date: 05/08/24;       Estimated  Needs:   Total Energy Estimated Needs (kCal):  (2000-2360kcal or 28-33kcal/kg)  Total Protein Estimated Needs (g):  (86-107g or 1.2-1.5g/kg)  Total Fluid Estimated Needs (mL):  (1ml/kcal or per DO recs)          Nutrition Diagnosis   Malnutrition Diagnosis  Patient has Malnutrition Diagnosis: Yes  Diagnosis Status: New  Malnutrition Diagnosis: Severe malnutrition related to chronic disease or condition  As Evidenced by: wt loss of > 5% in the last month and < 75% of estimated nutritional needs.    Nutrition Diagnosis  Patient has Nutrition Diagnosis: Yes  Diagnosis Status (1): New  Nutrition Diagnosis 1: Inadequate protein energy intake  Related to (1): nausea, lack of appetite, vomiting, constipation  As Evidenced by (1): pt reported not eating in the last 5 days, and weight loss of 15 lbs ( 9%) in the last month.       Nutrition Interventions/Recommendations         Nutrition Prescription:  Individualized Nutrition Prescription Provided for : Continue regular diet at this time for liberalized approach to promote protein/calorie intakes.        Nutrition Interventions:   Food and/or Nutrient Delivery Interventions  Interventions: Meals and snacks, Medical food supplement  Goal: offer food/drink every 2-3 hours throughout the day  Medical Food Supplement: Commercial beverage  Goal: Pt states he would like to try [employer] 1.0 (325kcal, 16g pro each)  Discussed AYR services with pt and he plans to order dental soft foods that he knows he can chew as his dentures do not fit  Coordination of Nutrition Care by a Nutrition Professional  Collaboration and Referral of Nutrition Care: Collaboration by nutrition professional with other providers  Goal: LIZBETH Patel           Nutrition Monitoring and Evaluation   Food/Nutrient Related History Monitoring  Monitoring and Evaluation Plan: Fluid intake, Amount of food  Fluid Intake: Estimated fluid intake  Criteria: Goal to consume >75% of fluids provided  Amount of Food: Estimated  amout of food  Criteria: Goal to consume >50% of meals provided    Body Composition/Growth/Weight History  Monitoring and Evaluation Plan: Weight  Weight: Measured weight  Criteria: maintain stable weight    Biochemical Data, Medical Tests and Procedures  Monitoring and Evaluation Plan: Electrolyte/renal panel  Electrolyte and Renal Panel: Sodium, Chloride  Criteria: maintain WNR            Time Spent/Follow-up Reminder:   Time Spent (min): 60 minutes  Last Date of Nutrition Visit: 05/09/24  Nutrition Follow-Up Needed?: 3-5 days  Follow up Comment: COMFORT

## 2024-05-09 NOTE — PROGRESS NOTES
Mu Elise is a 78 y.o. male on day 1 of admission presenting with NUNO (acute kidney injury) (CMS-[facility]).      Subjective   Overnight the patient indicated that he had chronic lower abdominal 19 more days oxy every 6 bladder scan also showed 554 mL of fluid.  The patient is able to void though we have changed Rocephin to 2 g indicated that we would like to place a Madison catheter.  Patient's bilirubin is better NUNO is improving transaminitis is mildly elevated and his hyponatremia is also better.       Objective     Last Recorded Vitals  /58   Pulse 74   Temp 36.2 °C (97.2 °F) (Temporal)   Resp 12   Wt 71.5 kg (157 lb 11.2 oz)   SpO2 100%   Intake/Output last 3 Shifts:    Intake/Output Summary (Last 24 hours) at 5/9/2024 1121  Last data filed at 5/9/2024 0858  Gross per 24 hour   Intake 1125.33 ml   Output 201 ml   Net 924.33 ml       Admission Weight  Weight: 68 kg (150 lb) (05/08/24 1308)    Daily Weight  05/08/24 : 71.5 kg (157 lb 11.2 oz)    Image Results  IR abdomen paracentesis  Narrative: Interpreted By:  Saeid Colin,   STUDY:  IR ABDOMEN PARACENTESIS; 5/9/202410:50 am      INDICATION:  Signs/Symptoms:possible paracentesis (theraputic)      COMPARISON:  None.      ACCESSION NUMBER(S):  [id number]      ORDERING CLINICIAN:  ROLANDO RAMOS      TECHNIQUE:  INTERVENTIONALIST(S):  Saeid Colin CNP      CONSENT:  The patient/patient's POA/next of kin was informed of the nature of  the proposed procedure. The purposes, alternatives, risks, and  benefits were explained and discussed. All questions were answered  and consent was obtained.      SEDATION:  None      MEDICATION/CONTRAST:  1% lidocaine      TIME OUT:  A time out was performed immediately prior to procedure start with  the interventional team, correctly identifying the patient name, date  of birth, MRN, procedure, anatomy (including marking of site and  side), patient position, procedure consent form, relevant laboratory  and imaging  test results, antibiotic administration, safety  precautions, and procedure-specific equipment needs.      FINDINGS:  The patient was placed into the supine position.      The patient's abdomen was scanned using the ultrasound probe. The  area of fluid most amenable to drainage was marked.      The patient's skin was sterilized using chlorhexidine and draped in  sterile manner. The skin was anesthestized with 1% lidocaine. A 5F  centesis catheter was inserted into the abdomen in the RLQ where  previously marked. A total of 6500 mL of yellow ascitic fluid was  removed.  The catheter was then removed and gauze and a tegaderm were  placed over the insertion site. Sterile technique used throughout  entirety of procedure.      Specimens were obtained, labeled and sent to the laboratory with  requisitions per primary team.      Patient tolerated procedure well and there were no immediate  complications identified.      Impression: Uneventful paracentesis, as detailed above. right hemiabdomen, 6500  mL.      Performed and dictated at [facility].      Signed by: Saeid Colin 5/9/2024 10:50 AM  Dictation workstation:   [id number]      Physical Exam  Constitutional:       Appearance: Normal appearance. He is ill-appearing.   HENT:      Head: Normocephalic.      Nose: Nose normal.      Mouth/Throat:      Pharynx: Oropharynx is clear.   Eyes:      Pupils: Pupils are equal, round, and reactive to light.   Cardiovascular:      Rate and Rhythm: Normal rate and regular rhythm.      Pulses: Normal pulses.      Heart sounds: Normal heart sounds.   Pulmonary:      Effort: Pulmonary effort is normal.      Breath sounds: Normal breath sounds.   Abdominal:      General: Abdomen is flat. Bowel sounds are normal. There is distension.      Palpations: Abdomen is soft.   Musculoskeletal:         General: Normal range of motion.   Skin:     General: Skin is warm.      Capillary Refill: Capillary  refill takes less than 2 seconds.   Neurological:      General: No focal deficit present.      Mental Status: He is alert and oriented to person, place, and time.   Psychiatric:         Mood and Affect: Mood normal.         Relevant Results               Assessment/Plan        Mr. 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 He was due to cycle 2 of treatment for HCC 5/8. States has been functionally declining for months, and has lost about 40 pounds, has not eaten anything substantial in weeks due to feeling of fullness from abd distention and n/v as well as abd pain from his liver cancer. Also endorses BRBPR and melena over last few weeks w/ small bowel movements every couple days, not consistently w/ blood. Also endorses 1 year of hematuria w/out gross clots.     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  Hx of varices requiring banding 2017  Portal vein thrombosis with distal reconstitution - not on AC  Elevated alkaline phosphatase, transaminitis hyperbilirubinemia 2/2 cirrhosis +/- immunotherapy  Acute on chronic normocytic anemia  MELD 3.0: 26 at 5/9/2024  6:20 AM  MELD-Na: 27 at 5/9/2024  6:20 AM  Calculated from:  Serum Creatinine: 1.64 mg/dL at 5/8/2024  1:37 PM  Serum Sodium: 128 mmol/L at 5/8/2024  1:37 PM  Total Bilirubin: 4.3 mg/dL at 5/8/2024  1:37 PM  Serum Albumin: 3.2 g/dL at 5/8/2024  1:37 PM  INR(ratio): 1.5 at 5/9/2024  6:20 AM  Age at listing (hypothetical): 78 years  Sex: Male at 5/9/2024  6:20 AM     -  Ascites: present, tense. Mild overload in LE  - Volume: appears intravascularly dry though with ascites and mild pedal edema BL. On home diuretics lasix 20 and spirinolactone 50mg daily  - EV: Hx of esophageal varices on EGD 2017 grade 2 varices, banded, PHG  - HE: none, not on home lactulose  - HCC: present, currently on immunotherapy and established w/ Dr. Red  - Transplant: not a candidate given advanced HCC     Recommendations:   -Will hold off on AC given GI consult from Freeman's recs that is high risk given unknown state of varices  -Given pt reports of BRBPR, melena, abd pain and distention, will cover for upper GIB though suspicion is low w/ Rocephin 1g, octreotide and PPI BID  -GI consult for possible EGD, is NPO  -IR consult for dx and therapeutic tap on 5/9  -No indication for liver transplant eval given advanced HCC  -Abd pain is likely due to liver cancer, will continue his home oxycodone and hold tylenol  -Nutrition, PT/OT consulted to optimize high protein diet and functional status  -See ACP note completed 5/8 evening, pt prefers to focus on comfort if his quality of life deemed to be poor. I have placed consults to palliative and hospice (at least for informational meeting). I have also discussed high risk of mortality given child c and meld scores and CT imaging that does appear to have worsening size and spread of lesions. I did specifically state I am not sure his treatment options and he is not sure he would like to continue immunotherapy  -Scheduled zofran and reglan to optimize reduction of n/v. EKG pending for Qtc evaluation.   -Daily MELD labs, iron panel added to 5/8 labs  -Liver ultrasound w/ dopplers ordered  -Hgb and vitals have been stable, Daily CBCs, transfuse < 7  -Madison catheter placed.          Nonoliguric, proteinuric NUNO on CKD2  Hx BPH  -Likely pre-renal given history of n/v, decreased oral intake over weeks  -Daily RFPs  -Bladder scan given Hx BPH  -Continue home  proscar  -2g Rocephin to cover for mild suspicion of UTI     Chronic hyponatremia due to cirrhosis + hypovolemia  -Patient's baseline is approximately 134-132 is currently 128  -Daily RFPs  -s/p 2 L NS boluses     Chronic conditions:  HTN  HLD  restless leg syndrome  Hypothyroidism  [id number] (A1c 6.0%)  -Home allopurinol, Lasix, spironolactone held for NUNO, aspirin held for bleeding, Crestor held for transaminitis, jardiance used for T2DM, currently held as sugars well controlled  -Home Mirapex continued for RLS, synthyroid resumed for hypothyroid  -Home n/v meds held due to IV use here  -Will hold off on sliding scale insulin given not eating  -home compazine held as states having hallucinations     IV fluids-patient is on normal saline  Diet-patient is on regular diet  CODE STATUS-DNR/DNI     Principal Problem:    NUNO (acute kidney injury) (CMS-[facility])    Jean-Pierre Freeman MD

## 2024-05-09 NOTE — CONSULTS
"Reason For Consult      History Of Present Illness  Mu Elise is a 78 y.o. male presenting with     Pt electing for hospice, multifocal hcc.         Past Medical History  He has a past medical history of Personal history of malignant neoplasm of liver (04/15/2021) and Personal history of other diseases of the musculoskeletal system and connective tissue (07/31/2019).    Surgical History  He has a past surgical history that includes Prostate surgery.     Social History  He reports that he has quit smoking. His smoking use included cigarettes. He has never used smokeless tobacco. He reports that he does not currently use alcohol. He reports that he does not use drugs.    Family History  Family History   Problem Relation Name Age of Onset    Other (family history of hepatic cirrhosis) Mother      Other (Family history of liver cancer) Mother      Other (Family history of lung disease) Father      Other (Family history of neoplasm of brain) Sister      Other (Family history of malignant neoplasm of prostate) Brother          Allergies  Lisinopril       Last Recorded Vitals  Blood pressure 121/72, pulse 81, temperature 36.2 °C (97.2 °F), temperature source Temporal, resp. rate 18, height 1.778 m (5' 10\"), weight 71.5 kg (157 lb 11.2 oz), SpO2 99%.    Assessment/Plan     Pt's code status is DNRCCA-DNI. Pt's contact is Spouse Shira . Pt with pmhx of HTN, HLD, CKD 2, T2DM, BPH, hepatocellular carcinoma . Spoke with medical team , pt interested in comfort and wishes to go home on Hospice. Plan to introduce Palliative Care and discuss Hospice options with pt/family.     12p  Met with pt at length bedside. Introdcued Palliative Care and discussed goals of care. Pt would like to focus on comfort and go home asap. Pt agreeable to meet with [facility] bedside today at 1230p. Await results of Hospice meeting, support provided. Will follow.     Pt signed consents for Hospice with [facility]. Plan for " discharge to home tomorrow on [employer]. Family to transport. Will follow     Amanda Guzman LCSW

## 2024-05-09 NOTE — ACP (ADVANCE CARE PLANNING)
Confirming Previous Code Status:   Advance Care Planning Note     Discussion Date: 05/09/24   Discussion Participants: patient, spouse, and son    The patient wishes to discuss Advance Care Planning today and the following is a brief summary of our discussion.     Patient has capacity to make their own medical decisions: Yes  Health Care Agent/Surrogate Decision Maker documented in chart: Yes    Documents on file and valid:  Advance Directive/Living Will:  unsure    Health Care Power of : Yes wife    Communication of Medical Status/Prognosis:   Discussed high mortality given high MELD and Child Hudson scores, Hx advanced HCC which appears worsened on imaging, alcohol vs MASLD induced cirrhosis w/ evidence of decompensation as history of varices    Communication of Treatment Goals/Options:   I specifically stated I am not sure further treatment options as I am not an oncologist or liver specialist. However, Dr. Red's note does state his treatment w/ immunotherapy is palliative, NOT curative. I do worry that he is not tolerating immunotherapy. Additionally, he has stated to me as well as other providers that if his treatment options compromise his quality of life, he would chose to pursue comfort at home w/ his children, wife and to go home w/ hospice.    Ultimately, we agreed to focus on his symptom control which is his ability to eat without fullness/n/v. He agreed to a paracentesis as this will help optimize his abd distention, scheduled zofran/reglan. We also discussed that it is appropriate to talk w/ GI team here for EGD if deemed necessary as well as treat for upper GIB though suspicion is low w/ PPI, octreotide and rocephin for sbp coverage.    Treatment Decisions  Goals of Care: quality of life is prioritized; willing to accept low-burden treatments to achieve meaningful goals     Follow Up Plan  Consult palliative, consult hospice for informational meeting    Team Members  Witnessed by bedside  nurse as well as patient's wife, Vani, and son    Kelli Solorio, DO  5/9/2024 7:54 AM

## 2024-05-09 NOTE — PROGRESS NOTES
Attempted to meet with patient at bedside. Patient is not in room at this time. Will attempt assessment at another time.     1230- Per palliative team, plan to meet with [facility] today at 1230p, sign consents, order DME/meds and d/c home tomorrow morning.       Tomasa Raines RN

## 2024-05-09 NOTE — CARE PLAN
EOS: Patient remained A&O x4 throughout this shift noting ABD discomfort, decreased with PRN oxycodone. Patient was noted to have firm and tender ABD with hypo bowel sounds, resident aware and assessed at bedside. Urinalysis sent and patient rested quietly with no acute events occurring. Patient bladder scanned at 0100 with 554ml in bladder, resident aware and noted with no new orders placed. Post void bladder scan completed at 0440 noting about 375ml retained after 200ml urination. Patient safety maintained with bed alarm on and audible with call bell/possessions within reach.     The patient's goals for the shift include      The clinical goals for the shift include see POC      Problem: Skin  Goal: Decreased wound size/increased tissue granulation at next dressing change  Outcome: Progressing  Flowsheets (Taken 5/9/2024 0322)  Decreased wound size/increased tissue granulation at next dressing change: Promote sleep for wound healing  Goal: Participates in plan/prevention/treatment measures  Outcome: Progressing  Flowsheets (Taken 5/9/2024 0322)  Participates in plan/prevention/treatment measures: Elevate heels  Goal: Prevent/manage excess moisture  Outcome: Progressing  Flowsheets (Taken 5/9/2024 0322)  Prevent/manage excess moisture: Moisturize dry skin  Goal: Prevent/minimize sheer/friction injuries  Outcome: Progressing  Flowsheets (Taken 5/9/2024 0322)  Prevent/minimize sheer/friction injuries: Increase activity/out of bed for meals  Goal: Promote/optimize nutrition  Outcome: Progressing  Flowsheets (Taken 5/9/2024 0322)  Promote/optimize nutrition: Consume > 50% meals/supplements  Goal: Promote skin healing  Outcome: Progressing  Flowsheets (Taken 5/9/2024 0322)  Promote skin healing: Turn/reposition every 2 hours/use positioning/transfer devices     Problem: Pain  Goal: Takes deep breaths with improved pain control throughout the shift  Outcome: Progressing  Goal: Turns in bed with improved pain control  throughout the shift  Outcome: Progressing  Goal: Walks with improved pain control throughout the shift  Outcome: Progressing  Goal: Performs ADL's with improved pain control throughout shift  Outcome: Progressing  Goal: Participates in PT with improved pain control throughout the shift  Outcome: Progressing  Goal: Free from opioid side effects throughout the shift  Outcome: Progressing  Goal: Free from acute confusion related to pain meds throughout the shift  Outcome: Progressing

## 2024-05-09 NOTE — NURSING NOTE
0940: Pt. Off floor at this time    1103: pt. Returned to floor at this time.    1134: Madison placed at this time per MD order. 16F coude, pt tolerated well. Urine dark with Red flecks. Awaiting Pharmacy to bring albumin to floor.     1256: still waiting for albumin from pharmacy at this time    1342: Albumin received from Pharmacy at this time. Order is  in MAR now due to time out. Per GI MD pt. Is still to receive both bags of albumin of 50G.     EOS: Pt. Stable throughout this shift. Madison placed this shift per MD order. Pt. Had paracentesis this shift and tolerated post procedure well. Albumin given this shift per order. Pt. Was able to ambulate to the bathroom this shift with staff assistance. Pt. Had no c/o of nausea this shift. Pt. Tolerating diet with no complications. Pt. Family at bedside this shift and updated on pt. Status. Pt. Currently resting in bed at this time. Call light within reach. Bed alarm on.

## 2024-05-09 NOTE — CONSULTS
Department of Internal Medicine  Gastroenterology  Consult note      Reason for Consult: concern for gIB, hx cirrhosis w/ hx banding 2017. having BRBPR and melena     Chief Complaint: Abnormal labs    History Obtained from: chart review and patient reports    History of Present Illness      The patient is a 78 y.o. male with signficant past medical history of HTN, HLD, CKD 2, T2DM, BPH, and alcoholic cirrhosis with hepatocellular carcinoma (tremelimumab & durvalumab)  who presents for abnormal labs from [facility].    He endorses some occasional abdominal pain either in his bilateral lower quadrants or epigastric region.  He denies any abdominal pain today.  He denies any nausea, vomiting, or odynophagia.  He states he does have some dysphagia but describes it as oropharyngeal with difficulty getting food from the front of his mouth to the back of his mouth.  He states he usually has to point his chin towards the andrew to swallow.    He endorses constipation for at least the last year.  Prior to that he was having bowel movements daily and now is having bowel movements less than weekly.  He states he occasionally sees bright red blood in the stool but is unclear if he is only passing small amounts or large amounts where the toilet bowl is turning red.  He states last time he saw blood in the toilet from his stool was 1 to 2 months ago.  He endorses having hard black stools which last occurred yesterday.    He denies use of tobacco or recreational drugs.  He states he has not had a drink in 10 to 12 years.  He denies any history of heavy alcohol use.  Used to use ibuprofen but stopped 2 months ago after being started on oxy.  He is not currently on blood thinners.    He denies any abdominal surgeries.  He states his last paracentesis was probably 8 years ago.  He has not seen a GI doctor since 2017 and states his primary care doctor has been managing his cirrhosis    Allergies  Lisinopril    Current  Medication    Current Facility-Administered Medications:     [Held by provider] acetaminophen (Tylenol) tablet 975 mg, 975 mg, oral, q6h, Jean-Pierre Freeman MD    cefTRIAXone (Rocephin) IVPB 1 g, 1 g, intravenous, q24h, Jean-Pierre Freeman MD, Stopped at 05/08/24 2303    cholecalciferol (Vitamin D-3) tablet 2,000 Units, 2,000 Units, oral, Daily, Jean-Pierre Freeman MD    finasteride (Proscar) tablet 5 mg, 5 mg, oral, Daily, Jean-Pierre Freeman MD, 5 mg at 05/08/24 2232    levothyroxine (Synthroid, Levoxyl) tablet 50 mcg, 50 mcg, oral, Daily before breakfast, Jean-Pierre Freeman MD    metoclopramide (Reglan) injection 10 mg, 10 mg, intravenous, q6h YUDELKA, Kelli Solorio DO, 10 mg at 05/08/24 2232    octreotide (SandoSTATIN) 500 mcg in sodium chloride 0.9% 100 mL (5 mcg/mL) infusion, 50 mcg/hr, intravenous, Continuous, Kelli Solorio DO, Last Rate: 10 mL/hr at 05/09/24 0640, 50 mcg/hr at 05/09/24 0640    ondansetron (Zofran) injection 4 mg, 4 mg, intravenous, q4h, Kelli Solorio DO    oxyCODONE (Roxicodone) immediate release tablet 5 mg, 5 mg, oral, q6h PRN, Cassius Torres MD, 5 mg at 05/09/24 0044    pantoprazole (ProtoNix) injection 40 mg, 40 mg, intravenous, BID, Kelli Solorio DO    pramipexole (Mirapex) tablet 0.125 mg, 0.125 mg, oral, Nightly, Jean-Pierre Freeman MD, 0.125 mg at 05/08/24 2232    [Held by provider] rosuvastatin (Crestor) tablet 5 mg, 5 mg, oral, Daily, Jean-Pierre Freeman MD    [Held by provider] spironolactone (Aldactone) tablet 50 mg, 50 mg, oral, Daily, Jean-Pierre Freeman MD    Past Medical History  Active Ambulatory Problems     Diagnosis Date Noted    Actinic keratoses 02/17/2023    Essential hypertension 02/17/2023    Hyperlipidemia 02/17/2023    Hyperuricemia 02/17/2023    Hypothyroidism 02/17/2023    Stage 2 chronic kidney disease 02/17/2023    Need for influenza vaccination 02/17/2023    Type 2 diabetes mellitus with microalbuminuria, with long-term current use of insulin (Multi) 02/17/2023    History of liver cancer 02/17/2023     Benign prostatic hyperplasia with urinary obstruction 08/22/2017    Hypothyroidism due to acquired atrophy of thyroid 03/22/2018    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    Polyp of colon 04/12/2023    Vitamin D deficiency 03/22/2018    Liver lesion 03/22/2024    Hepatocellular carcinoma (Multi) 03/29/2024     Resolved Ambulatory Problems     Diagnosis Date Noted    Malignant neoplasm of liver (Multi) 03/07/2024     Past Medical History:   Diagnosis Date    Personal history of malignant neoplasm of liver 04/15/2021    Personal history of other diseases of the musculoskeletal system and connective tissue 07/31/2019       Past Surgical History  Past Surgical History:   Procedure Laterality Date    PROSTATE SURGERY         Family History  Family History   Problem Relation Name Age of Onset    Other (family history of hepatic cirrhosis) Mother      Other (Family history of liver cancer) Mother      Other (Family history of lung disease) Father      Other (Family history of neoplasm of brain) Sister      Other (Family history of malignant neoplasm of prostate) Brother       No family history of stomach or colon cancer    Social History  TOBACCO:  reports that he has quit smoking. His smoking use included cigarettes. He has never used smokeless tobacco.  ETOH:  reports that he does not currently use alcohol.  DRUGS:  reports no history of drug use.  MARITAL STATUS:   OCCUPATION:    Review of Systems  Review of Systems   Constitutional:  Positive for appetite change and unexpected weight change (30 lbs in 1 year). Negative for chills and fever.   HENT:  Negative for mouth sores, sore throat and trouble swallowing.    Eyes:  Negative for pain and visual disturbance.   Respiratory:  Negative for cough, shortness of breath and wheezing.    Cardiovascular:  Negative for chest pain.   Genitourinary:  Positive for dysuria and hematuria (red with clots). Negative for decreased urine volume.   Musculoskeletal:  Negative for arthralgias,  "joint swelling and myalgias.   Skin:  Negative for pallor and rash.   Neurological:  Positive for weakness. Negative for dizziness, numbness and headaches.   Psychiatric/Behavioral:  Positive for confusion (difficulty findings words, hallucinations).        PHYSICAL EXAM  VS: /72 (Patient Position: Standing)   Pulse 81   Temp 36.2 °C (97.2 °F) (Temporal)   Resp 18   Ht 1.778 m (5' 10\")   Wt 71.5 kg (157 lb 11.2 oz)   SpO2 99%   BMI 22.63 kg/m²  Body mass index is 22.63 kg/m².  Physical Exam  Constitutional:       General: He is not in acute distress.     Comments: Chronically ill appearing    HENT:      Mouth/Throat:      Mouth: Mucous membranes are moist.      Pharynx: Oropharynx is clear.   Eyes:      General: No scleral icterus.     Extraocular Movements: Extraocular movements intact.      Conjunctiva/sclera: Conjunctivae normal.      Pupils: Pupils are equal, round, and reactive to light.   Cardiovascular:      Rate and Rhythm: Normal rate and regular rhythm.      Heart sounds: No murmur heard.  Pulmonary:      Effort: Pulmonary effort is normal.      Breath sounds: No wheezing or rhonchi.   Abdominal:      General: Bowel sounds are normal. There is distension.      Palpations: Abdomen is soft. There is no mass.      Tenderness: There is no abdominal tenderness.      Hernia: No hernia is present.   Genitourinary:     Comments: Rectal exam with nurse at bedside. No external lesions, possilbe BPH/mass palpated in rectum, yellow stool  Musculoskeletal:         General: No swelling or deformity.   Skin:     General: Skin is warm.      Coloration: Skin is not jaundiced.   Neurological:      General: No focal deficit present.      Mental Status: He is alert and oriented to person, place, and time.      Comments: No asterixis on exam   Psychiatric:         Mood and Affect: Mood normal.          DATA  Recent blood work and relevant radiology and endoscopic studies were reviewed and discussed with the " patient   Results from last 7 days   Lab Units 05/09/24  0620   WBC AUTO x10*3/uL 7.1   RBC AUTO x10*6/uL 4.64   HEMOGLOBIN g/dL 12.6*   HEMATOCRIT % 38.3*   MCV fL 83   MCHC g/dL 32.9   RDW % 17.8*   PLATELETS AUTO x10*3/uL 260       Results from last 72 hours   Lab Units 05/09/24  0620   SODIUM mmol/L 128*   POTASSIUM mmol/L 4.0   CHLORIDE mmol/L 93*   CO2 mmol/L 21   BUN mg/dL 31*   CREATININE mg/dL 1.50*   CALCIUM mg/dL 9.1   PROTEIN TOTAL g/dL 6.7   BILIRUBIN TOTAL mg/dL 4.0*   ALK PHOS U/L 231*   AST U/L 187*   ALT U/L 27       Results from last 72 hours   Lab Units 05/09/24  0620   INR  1.5*      Latest Reference Range & Units 05/08/24 18:08   Acetaminophen 10.0 - 30.0 ug/mL <10.0   Salicylate  4 - 20 mg/dL <3   Alcohol <=10 mg/dL <10      Latest Reference Range & Units 05/09/24 06:20   FERRITIN 20 - 300 ng/mL 334 (H)   (H): Data is abnormally high     Latest Reference Range & Units 05/09/24 06:20   IRON 35 - 150 ug/dL 94     RADIOLOGY REVIEW  CT Abdomen Pelvis W Contrast 5/8/24  IMPRESSION:  1.  There is a moderate volume of ascites. It is somewhat increased from the prior.  2. The appearance of cirrhosis and extensive opacity are carcinoma similar to the prior. However the amount of tumor thrombus in the right portal vein may have progressed slightly and now extends up of the left portal vein. The divisions of the left portal vein do opacify normally.  3. There appears to be some more hypoattenuating bland thrombus in the main portal vein. Additionally hypoenhancement of the proximal superior mesenteric vein and adjacent jejunal branches noted. Developing thrombosis is a consideration.  4. Enlarged prostate with thickened bladder wall.    ENDOSCOPIC REVIEW  EGD by Dr Rodriguez 03/01/2017 indicated for follow-up esophageal varices  -Nonbleeding grade 2 esophageal varices, banded  -Erythematous mucosa in the gastric body  -Normal examined duodenum  -No specimens collected     Colonoscopy by Dr Whitaker 11/21/2017  -  A 12 mm polyp in the cecum, status post resection.  Inflammatory/hyperplastic polyp  - A 10 mm polyp in the sigmoid colon, status post resection.  Tubular adenoma with moderate inflammation and lymphoid cell aggregates  - Three 4-5 mm polyps in the rectum. Hyperplastic/inflammatory polyp with moderately severe inflammation in the lamina propria  - Small 15 mm polyp in the transverse colon, lipoma.  - Internal hemorrhoids  - repeat colonoscopy in 3 years for surveillance, due 2020.    FINAL DIAGNOSIS:   A.  CECUM POLYP-   INFLAMMATORY/HYPERPLASTIC POLYP WITH MARKED INFLAMMATION.     B.  SIGMOID POLYP-   TUBULAR ADENOMA WITH MODERATE INFLAMMATION AND LYMPHOID CELL AGGREGATES.     C.  RECTAL POLYPS-   HYPERPLASTIC/INFLAMMATORY POLYP WITH MODERATELY SEVERE INFLAMMATION IN  THE LAMINA PROPRIA. SIVES/SIVES     IMPRESSION/RECOMMENDATIONS  The patient is a 78 y.o. male with signficant past medical history of HTN, HLD, CKD 2, T2DM, BPH, and alcoholic cirrhosis with hepatocellular carcinoma (tremelimumab & durvalumab)  who presents for abnormal labs from [facility].    Reported melena/hematochezia - hgb (5/9) 12.6, baselines 12-13. Rectal exam with yellow stool, although firmness on rectal noted could be 2/2 to BPH/ Low concern for variceal bleed. Hx of large TA without repeat endoscopy. Hx of varices banded 2017 without repeat.   Decompensated cirrhosis w/HCC - MELD 26. Large volume ascites 5/9, varices, and HCC. Current NUNO may need to consider albumin challenge for HCC. No HE on exam.   Elevated LFTs - likely acutely decompensation from progression PVY  Right portal vein -  progressing to left portal vein, main portal vein, and likely proximal superior mesenteric vein and adjacent jejunal branches     PLAN  -Recommend EGD and Flex Sig 5/9, patient is agreeable   -Given likely increasing PVT may need to consider AC to prevent progression, consider eval by heme/onc  -Will likely need completed colonoscopy as  outpatient based on GOC  -Agree with IR paracentesis, will add fluid analysis studies  -Currently on Rocephin for SBP prophylaxis   -Currently on pantoprazole 40 mg BID   -Currently on octreotide 50 mcg/hr  -Currently n.p.o.  -Continue supportive care per primary team    Of note, patient is interested in Hospice.     Discussed with Dr Tapia, GI to follow    ADDENDUM 1400  Patient has elected for hospice care.  Current goals are to eat and be comfortable.  He is feeling much better after his ultrasound paracentesis with fluid removal.  Ultrasound with Doppler, EGD, and flex sig have all been canceled.  Hospice team and primary team has been updated.    Place order for clear liquid diet with advancement as tolerated.    GI to sign off please call questions or concerns    (Electronically signed byDeedee Velez PA-C on 5/9/2024 at 8:39 AM)    Inpatient consult to Gastroenterology  Consult performed by: Deedee Velez PA-C  Consult ordered by: Vianney Tong DO

## 2024-05-09 NOTE — DISCHARGE INSTRUCTIONS
Please follow up with your primary care provider within 7 days for hospital follow up. Please call to make this appointment.     Please take your medications as prescribed. Your medications have been optimized for comfort- Stop allopurinol, aspirin and Jardiance. Stop compazine as it was likely causing hallucinations. Start Reglan 10mg every 4 hours as needed for n/v.    If you have any new or worsening symptoms seek medical attention.    Thank you for allowing us to participate in your care!    -[facility].

## 2024-05-09 NOTE — POST-PROCEDURE NOTE
Interventional Radiology Brief Postprocedure Note    Procedure: Paracentesis    Provider: TRI Ugarte    Assistant: None    Diagnosis: Ascites    Description of procedure: A time out was performed and Right Hemiabdomenwas examined with US and appropriate entry point was confirmed and marked.  The patient was prepped and draped in a sterile manner, 1% lidocaine was used to anesthesize the skin and subcutaneous tissue. A 5F Centesis needle was then introduced through the skin into the peritoneal space, the centesis catheter was then threaded without difficulty. 6500 ml of yellow fluid was removed without difficulty. The catheter was then removed. Specimens labeled and sent to lab per primary team. No immediate complications were noted during and immediately following the procedure.          Medications  As of 05/09/24 1023      sodium chloride 0.9 % bolus 1,000 mL (mL/hr) Total volume:  Not documented* Dosing weight:  68   *Total volume has not been documented. View each administration to see the amount administered.     Date/Time Rate/Dose/Volume Action       05/08/24  1437 1,000 mL - 60,000 mL/hr (over 1 min) New Bag               sodium chloride 0.9 % bolus 1,000 mL (mL/hr) Total volume:  1,000 mL* Dosing weight:  71.5   *From user-documented volume     Date/Time Rate/Dose/Volume Action       05/08/24  1831 1,000 mL - 250 mL/hr (over 240 min) New Bag      2231 1,000 mL Stopped               iohexol (OMNIPaque) 300 mg iodine/mL solution 75 mL (mL) Total volume:  75 mL Dosing weight:  68      Date/Time Rate/Dose/Volume Action       05/08/24  1505 75 mL Given               cefTRIAXone (Rocephin) IVPB 1 g (mL/hr) Total dose:  1 g* Dosing weight:  68   *From user-documented volume     Date/Time Rate/Dose/Volume Action       05/08/24  Canceled Entry      2233 1 g - 100 mL/hr (over 30 min) New Bag      2303 50 mL Stopped               pantoprazole (ProtoNix) injection 80 mg (mg) Total dose:  80 mg Dosing  weight:  71.5      Date/Time Rate/Dose/Volume Action       05/08/24  1831 80 mg Given               pantoprazole (ProtoNix) injection 40 mg (mg) Total dose:  40 mg Dosing weight:  71.5      Date/Time Rate/Dose/Volume Action       05/09/24  0856 40 mg Given               octreotide (SandoSTATIN) 500 mcg in sodium chloride 0.9% 100 mL (5 mcg/mL) infusion (mcg/hr) Total dose:  376.65 mcg* Dosing weight:  71.5   *From user-documented volume     Date/Time Rate/Dose/Volume Action       05/08/24  2308 50 mcg/hr - 10 mL/hr New Bag     05/09/24  0640 50 mcg/hr - 10 mL/hr Rate Verify               ondansetron (Zofran) injection 4 mg (mg) Total dose:  0 mg* Dosing weight:  71.5   *Administration not included in total     Date/Time Rate/Dose/Volume Action       05/08/24 2000 *4 mg Missed      2245 *4 mg Missed     05/09/24  0200 *4 mg Missed      0600 *4 mg Missed      1000 *4 mg Missed               metoclopramide (Reglan) injection 10 mg (mg) Total dose:  10 mg* Dosing weight:  71.5   *Administration not included in total     Date/Time Rate/Dose/Volume Action       05/08/24  2232 10 mg Given     05/09/24  0400 *10 mg Missed      1000 *10 mg Missed               acetaminophen (Tylenol) tablet 975 mg (mg) Total dose:  Cannot be calculated* Dosing weight:  71.5   *Administration dose not documented     Date/Time Rate/Dose/Volume Action       05/08/24  1828 *Not included in total Held by provider      1845 *Not included in total Automatically Held     05/09/24  0045 *975 mg Missed      0645 *975 mg Missed               cholecalciferol (Vitamin D-3) tablet 2,000 Units (Units) Total dose:  0 Units*   *Administration not included in total     Date/Time Rate/Dose/Volume Action       05/09/24  0600 *2,000 Units Missed               finasteride (Proscar) tablet 5 mg (mg) Total dose:  5 mg*   *Administration not included in total     Date/Time Rate/Dose/Volume Action       05/08/24  2232 5 mg Given     05/09/24  0900 *5 mg Missed                levothyroxine (Synthroid, Levoxyl) tablet 50 mcg (mcg) Total dose:  0 mcg*   *Administration not included in total     Date/Time Rate/Dose/Volume Action       05/09/24 0600 *50 mcg Missed               pramipexole (Mirapex) tablet 0.125 mg (mg) Total dose:  0.125 mg Dosing weight:  71.5      Date/Time Rate/Dose/Volume Action       05/08/24  2232 0.125 mg Given               rosuvastatin (Crestor) tablet 5 mg (mg) Total dose:  Cannot be calculated*   *Administration dose not documented     Date/Time Rate/Dose/Volume Action       05/08/24 1830 *Not included in total Held by provider      1845 *Not included in total Automatically Held     05/09/24 0900 *5 mg Missed               spironolactone (Aldactone) tablet 50 mg (mg) Total dose:  Cannot be calculated*   *Administration dose not documented     Date/Time Rate/Dose/Volume Action       05/08/24 1830 *Not included in total Held by provider      1845 *Not included in total Automatically Held     05/09/24 0900 *50 mg Missed               oxyCODONE (Roxicodone) immediate release tablet 5 mg (mg) Total dose:  5 mg Dosing weight:  71.5      Date/Time Rate/Dose/Volume Action       05/09/24  0044 5 mg Given               lidocaine PF (Xylocaine) 10 mg/mL (1 %) injection (mL) Total volume:  9 mL      Date/Time Rate/Dose/Volume Action       05/09/24  0958 9 mL Given                     Complications: None    Estimated Blood Loss: none        See detailed result report with images in PACS.    The patient tolerated the procedure well without incident or complication and is in stable condition.

## 2024-05-10 NOTE — CARE PLAN
The pt is ready to go and has discharge orders- the pt is just needing to void post catheter removal.

## 2024-05-10 NOTE — CARE PLAN
EOS: Patient remained A&O x4 throughout this shift noting moderate back pain, relieved with PRN oxy. [place] care and renetta care completed this shift and patient repositioned independently in bed. Patient refused all IV antinausea medications d/t patient stating he doesn't need them. Patient rested quietly with no acute events occurring. Patient safety maintained with bed alarm on and audible with call bell within reach.     The patient's goals for the shift include      The clinical goals for the shift include see POC      Problem: Skin  Goal: Decreased wound size/increased tissue granulation at next dressing change  Outcome: Progressing  Goal: Participates in plan/prevention/treatment measures  Outcome: Progressing  Goal: Prevent/manage excess moisture  Outcome: Progressing  Goal: Prevent/minimize sheer/friction injuries  Outcome: Progressing  Goal: Promote/optimize nutrition  Outcome: Progressing  Goal: Promote skin healing  Outcome: Progressing     Problem: Pain  Goal: Takes deep breaths with improved pain control throughout the shift  Outcome: Progressing  Goal: Turns in bed with improved pain control throughout the shift  Outcome: Progressing  Goal: Walks with improved pain control throughout the shift  Outcome: Progressing  Goal: Performs ADL's with improved pain control throughout shift  Outcome: Progressing  Goal: Participates in PT with improved pain control throughout the shift  Outcome: Progressing  Goal: Free from opioid side effects throughout the shift  Outcome: Progressing  Goal: Free from acute confusion related to pain meds throughout the shift  Outcome: Progressing     Problem: Pain - Adult  Goal: Verbalizes/displays adequate comfort level or baseline comfort level  Outcome: Progressing     Problem: Safety - Adult  Goal: Free from fall injury  Outcome: Progressing     Problem: Discharge Planning  Goal: Discharge to home or other facility with appropriate resources  Outcome: Progressing     Problem:  Chronic Conditions and Co-morbidities  Goal: Patient's chronic conditions and co-morbidity symptoms are monitored and maintained or improved  Outcome: Progressing     Problem: Diabetes  Goal: Achieve decreasing blood glucose levels by end of shift  Outcome: Progressing  Goal: Increase stability of blood glucose readings by end of shift  Outcome: Progressing  Goal: Decrease in ketones present in urine by end of shift  Outcome: Progressing  Goal: Maintain electrolyte levels within acceptable range throughout shift  Outcome: Progressing  Goal: Maintain glucose levels >70mg/dl to <250mg/dl throughout shift  Outcome: Progressing  Goal: No changes in neurological exam by end of shift  Outcome: Progressing  Goal: Learn about and adhere to nutrition recommendations by end of shift  Outcome: Progressing  Goal: Vital signs within normal range for age by end of shift  Outcome: Progressing  Goal: Increase self care and/or family involovement by end of shift  Outcome: Progressing  Goal: Receive DSME education by end of shift  Outcome: Progressing     Problem: Fall/Injury  Goal: Not fall by end of shift  Outcome: Progressing  Goal: Be free from injury by end of the shift  Outcome: Progressing  Goal: Verbalize understanding of personal risk factors for fall in the hospital  Outcome: Progressing  Goal: Verbalize understanding of risk factor reduction measures to prevent injury from fall in the home  Outcome: Progressing  Goal: Use assistive devices by end of the shift  Outcome: Progressing  Goal: Pace activities to prevent fatigue by end of the shift  Outcome: Progressing

## 2024-05-10 NOTE — DISCHARGE SUMMARY
Discharge Diagnosis  NUNO (acute kidney injury) (CMS-HCC)    Issues Requiring Follow-Up  Patient is going hospice no need to follow-up.  -Patient has had urinary retention.  May require intermittent catheterization please BladderScan.    Discharge Meds     Your medication list        START taking these medications        Instructions Last Dose Given Next Dose Due   metoclopramide 10 mg tablet  Commonly known as: Reglan      Take 1 tablet (10 mg) by mouth 4 times a day as needed (nausea).              CONTINUE taking these medications        Instructions Last Dose Given Next Dose Due   acetaminophen 500 mg tablet  Commonly known as: Tylenol           cholecalciferol 50 mcg (2,000 unit) capsule  Commonly known as: Vitamin D-3           finasteride 5 mg tablet  Commonly known as: Proscar      Take 1 tablet (5 mg) by mouth once daily.       furosemide 20 mg tablet  Commonly known as: Lasix      Take 1 tablet (20 mg) by mouth once daily.       levothyroxine 50 mcg tablet  Commonly known as: Synthroid, Levoxyl      Take 1 tablet (50 mcg) by mouth once daily in the morning. Take before meals.       ondansetron 8 mg tablet  Commonly known as: Zofran      Take 1 tablet (8 mg) by mouth every 8 hours if needed for vomiting or nausea. May take up to 3 times a day before meals until symptoms improve.       oxyCODONE 5 mg immediate release tablet  Commonly known as: Roxicodone      Take 1 tablet (5 mg) by mouth every 6 hours if needed for moderate pain (4 - 6).       pramipexole 0.125 mg tablet  Commonly known as: Mirapex      Take 1 tablet (0.125 mg) by mouth once daily at bedtime.       rosuvastatin 5 mg tablet  Commonly known as: Crestor      Take 1 tablet (5 mg) by mouth once daily.       sennosides 8.6 mg tablet  Commonly known as: Senokot           spironolactone 50 mg tablet  Commonly known as: Aldactone      Take 1 tablet (50 mg) by mouth once daily.              STOP taking these medications      allopurinol 100 mg  tablet  Commonly known as: Zyloprim        aspirin 81 mg EC tablet        empagliflozin 10 mg  Commonly known as: Jardiance        prochlorperazine 10 mg tablet  Commonly known as: Compazine                  Where to Get Your Medications        These medications were sent to [facility] [address] [place] [address], [place] - [address]  [address] [facility], [facility] [address]      Phone: [phone]   metoclopramide 10 mg tablet         Test Results Pending At Discharge  Pending Labs       Order Current Status    Urine culture In process    Sterile Fluid Culture/Smear Preliminary result            Hospital Course   Mr. 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 He was due to cycle 2 of treatment for HCC 5/8. States has been functionally declining for months, and has lost about 40 pounds, has not eaten anything substantial in weeks due to feeling of fullness from abd distention and n/v as well as abd pain from his liver cancer. Also endorses BRBPR and melena over last few weeks w/ small bowel movements every couple days, not consistently w/ blood. Also endorses 1 year of hematuria w/out gross clots.  On evaluation here the patient decayed he would like to go to hospice.  He was having mild urinary retention Madison catheter was placed and his ascites was drained from his abdomen.  Lab results showed no acute findings for spontaneous bacterial peritonitis.    Pertinent Physical Exam At Time of Discharge  Physical Exam  Constitutional:       Appearance: Normal appearance.   HENT:      Head: Normocephalic.      Nose: Nose normal.      Mouth/Throat:      Mouth:  Mucous membranes are moist.   Eyes:      Pupils: Pupils are equal, round, and reactive to light.   Cardiovascular:      Rate and Rhythm: Normal rate and regular rhythm.      Pulses: Normal pulses.      Heart sounds: Normal heart sounds.   Pulmonary:      Effort: Pulmonary effort is normal.      Breath sounds: Normal breath sounds.   Abdominal:      General: Abdomen is flat. There is distension.      Palpations: Abdomen is soft.   Musculoskeletal:         General: Normal range of motion.   Skin:     General: Skin is warm.      Capillary Refill: Capillary refill takes less than 2 seconds.   Neurological:      General: No focal deficit present.      Mental Status: He is alert and oriented to person, place, and time.   Psychiatric:         Mood and Affect: Mood normal.         Behavior: Behavior normal.         Outpatient Follow-Up  Future Appointments   Date Time Provider Department Center   5/31/2024 10:40 AM Torsten Red MD [id number] [facility]   7/11/2024  3:00 PM Geovani Griggs DO [id number] [place]         Jean-Pierre Freeman MD

## 2024-05-10 NOTE — CARE PLAN
Problem: Skin  Goal: Decreased wound size/increased tissue granulation at next dressing change  Outcome: Met  Goal: Participates in plan/prevention/treatment measures  Outcome: Met  Goal: Prevent/manage excess moisture  Outcome: Met  Goal: Prevent/minimize sheer/friction injuries  Outcome: Met  Goal: Promote/optimize nutrition  Outcome: Met  Goal: Promote skin healing  Outcome: Met     Problem: Pain  Goal: Takes deep breaths with improved pain control throughout the shift  Outcome: Met  Goal: Turns in bed with improved pain control throughout the shift  Outcome: Met  Goal: Walks with improved pain control throughout the shift  Outcome: Met  Goal: Performs ADL's with improved pain control throughout shift  Outcome: Met  Goal: Participates in PT with improved pain control throughout the shift  Outcome: Met  Goal: Free from opioid side effects throughout the shift  Outcome: Met  Goal: Free from acute confusion related to pain meds throughout the shift  Outcome: Met     Problem: Pain - Adult  Goal: Verbalizes/displays adequate comfort level or baseline comfort level  Outcome: Met     Problem: Safety - Adult  Goal: Free from fall injury  Outcome: Met   The patient's goals for the shift include    The pt is going home with hospice care and the wife is taking him home. The pts IV discontinued at this time and discharge paper reviewed and all questions answered.

## 2024-05-10 NOTE — PROGRESS NOTES
"Mu Elise is a 78 y.o. male on day 2 of admission presenting with NUNO (acute kidney injury) (CMS-HCC).    Subjective     Plan for discharge home today on [facility]. Pt/family aware and agreeable to Hospice plan of care, discharge and family to transport pr home. [facility] notified of discharge time.        Objective         Last Recorded Vitals  Blood pressure 109/66, pulse 70, temperature 36.3 °C (97.3 °F), temperature source Temporal, resp. rate 16, height 1.778 m (5' 10\"), weight 71.5 kg (157 lb 11.2 oz), SpO2 98%.  Intake/Output last 3 Shifts:  I/O last 3 completed shifts:  In: 1915.3 (26.8 mL/kg) [P.O.:640; I.V.:75.3 (1.1 mL/kg); Blood:150; IV Piggyback:1050]  Out: 1825 (25.5 mL/kg) [Urine:1825 (0.7 mL/kg/hr)]  Weight: 71.5 kg     Relevant Results                        Malnutrition Diagnosis Status: New  Malnutrition Diagnosis: Severe malnutrition related to chronic disease or condition  As Evidenced by: wt loss of > 5% in the last month and < 75% of estimated nutritional needs.  I agree with the dietitian's malnutrition diagnosis.      Assessment/Plan   Principal Problem:    NUNO (acute kidney injury) (CMS-HCC)                Amanda Guzman LCSW      "

## 2024-05-11 NOTE — DOCUMENTATION CLARIFICATION NOTE
"    PATIENT:               BREANA BLOCK  ACCT #:                  [id number]  MRN:                       [id number]  :                       1945  ADMIT DATE:       2024 1:05 PM  DISCH DATE:        5/10/2024 3:05 PM  RESPONDING PROVIDER #:        [id number]          PROVIDER RESPONSE TEXT:    Severe Protein Calorie Malnutrition    CDI QUERY TEXT:    Clarification    Instruction:    Based on your assessment of the patient and the clinical information, please provide the requested documentation by clicking on the appropriate radio button and enter any additional information if prompted.    Question: Please further clarify this patient nutritional status as    When answering this query, please exercise your independent professional judgment. The fact that a question is being asked, does not imply that any particular answer is desired or expected.    The patient's clinical indicators include:  Clinical Information:  78M presents for hematuria, transaminitis, NUNO    Clinical Indicators:  - BMI 22.63  - HP, , [place]: \"functionally declining for months, and has lost about 40 pounds in last 2 months. Is unable to eat or drink anything due to feeling of fullness as well as n/v\"  - RD, : Severe malnutrition related to chronic disease or condition As Evidenced by: wt loss of > 5% in the last month and < 75% of estimated nutritional needs.    Treatment:  Sonya Burns nutritional supplement, liberalized diet, RD consult, palliative care    Risk Factors:  Hepatocellular carcinoma on chemo, anemia  Options provided:  -- Severe Protein Calorie Malnutrition  -- Protein Calorie Malnutrition, Please specify severity  -- Other - I will add my own diagnosis  -- Refer to Clinical Documentation Reviewer    Query created by: Heriberto Escamilla on 5/10/2024 11:25 AM      Electronically signed by:  DC COTO MD 2024 10:15 AM          "

## 2024-05-14 NOTE — PROGRESS NOTES
Notified by Dr. Red that Mu's FUV on 5/31 needs to be rescheduled as he will be out of the office that day. OK to change to a virtual appointment with Candy Reilly CNP on 5/30, or reschedule per patient preference. Message sent to scheduling team.

## 2024-05-29 ENCOUNTER — TELEPHONE (OUTPATIENT)
Dept: PRIMARY CARE | Facility: CLINIC | Age: 79
End: 2024-05-29
Payer: MEDICARE

## 2024-05-31 ENCOUNTER — APPOINTMENT (OUTPATIENT)
Dept: HEMATOLOGY/ONCOLOGY | Facility: CLINIC | Age: 79
End: 2024-05-31
Payer: MEDICARE

## 2024-06-03 ENCOUNTER — APPOINTMENT (OUTPATIENT)
Dept: PRIMARY CARE | Facility: CLINIC | Age: 79
End: 2024-06-03
Payer: MEDICARE

## 2024-07-11 ENCOUNTER — APPOINTMENT (OUTPATIENT)
Dept: PRIMARY CARE | Facility: CLINIC | Age: 79
End: 2024-07-11
Payer: MEDICARE

## 2024-10-12 NOTE — TELEPHONE ENCOUNTER
Anson pt needs refills  Metformin XR 500mg, 1 tab daily  Spironolactone 50mg, 1 tab daily  Allopurinol 300mg, 1 tab daily  Cal Parisi, 90 day with refills  Pt's # [phone]    Size Of Lesion: 2cm nodule Detail Level: Detailed Size Of Lesion: 2-3mm nodules

## (undated) DEVICE — SET,IRRIGATION,CYSTO/TUR: Brand: MEDLINE

## (undated) DEVICE — LABEL MED MINI W/ MARKER

## (undated) DEVICE — NEEDLE SPNL 22GA L7IN SPINOCAN

## (undated) DEVICE — GAUZE,SPONGE,4"X4",16PLY,XRAY,STRL,LF: Brand: MEDLINE

## (undated) DEVICE — SPONGE GZ W4XL4IN COT 12 PLY TYP VII WVN C FLD DSGN

## (undated) DEVICE — GOWN,AURORA,NONREINFORCED,LARGE: Brand: MEDLINE

## (undated) DEVICE — GLOVE ORANGE PI 8   MSG9080

## (undated) DEVICE — PAD N ADH W3XL4IN POLY COT SFT PERF FLM EASILY CUT ABSRB

## (undated) DEVICE — CATHETER URETH 18FR BLLN 5CC STD LTX 2 W F SGL DRNGE EYE M

## (undated) DEVICE — MAX-CORE® DISPOSABLE CORE BIOPSY INSTRUMENT, 18G X 20CM: Brand: MAX-CORE

## (undated) DEVICE — COVER,TABLE,44X90,STERILE: Brand: MEDLINE

## (undated) DEVICE — DBD-PACK,CYSTOSCOPY,PK VI,AURORA: Brand: MEDLINE

## (undated) DEVICE — COVER PRB ULTRASOUND 20X3.5 CM BULK ENDOCAVITY NS LTX

## (undated) DEVICE — JELLY,LUBE,STERILE,FLIP TOP,TUBE,2-OZ: Brand: MEDLINE

## (undated) DEVICE — BAG DRNGE 19OZ VYN LEG FLIP-FLO VLV FAB STRP DISPOZ-A-BG

## (undated) DEVICE — DRAINBAG,ANTI-REFLUX TOWER,L/F,2000ML,LL: Brand: MEDLINE

## (undated) DEVICE — TRAY PREP DRY W/ PREM GLV 2 APPL 6 SPNG 2 UNDPD 1 OVERWRAP

## (undated) DEVICE — EVACUATOR URO BLDR W/ ADPT UROVAC

## (undated) DEVICE — SYRINGE MED 10ML LUERLOCK TIP W/O SFTY DISP

## (undated) DEVICE — DEVICE BX 18 GAX21 CM EZ COR

## (undated) DEVICE — TOWEL,OR,DSP,ST,BLUE,STD,4/PK,20PK/CS: Brand: MEDLINE

## (undated) DEVICE — SWABSTICK MEDICATED 10% POVIDONE IOD PVP SGL ANTISEP SAT

## (undated) DEVICE — GOWN,SIRUS,POLYRNF,BRTHSLV,XLN/XL,20/CS: Brand: MEDLINE